# Patient Record
Sex: MALE | Race: ASIAN | NOT HISPANIC OR LATINO | Employment: UNEMPLOYED | URBAN - METROPOLITAN AREA
[De-identification: names, ages, dates, MRNs, and addresses within clinical notes are randomized per-mention and may not be internally consistent; named-entity substitution may affect disease eponyms.]

---

## 2017-01-19 ENCOUNTER — COMMUNICATION - HEALTHEAST (OUTPATIENT)
Dept: FAMILY MEDICINE | Facility: CLINIC | Age: 67
End: 2017-01-19

## 2017-01-20 ENCOUNTER — RECORDS - HEALTHEAST (OUTPATIENT)
Dept: ADMINISTRATIVE | Facility: OTHER | Age: 67
End: 2017-01-20

## 2017-01-31 ENCOUNTER — OFFICE VISIT - HEALTHEAST (OUTPATIENT)
Dept: FAMILY MEDICINE | Facility: CLINIC | Age: 67
End: 2017-01-31

## 2017-01-31 DIAGNOSIS — R39.15 BENIGN PROSTATIC HYPERPLASIA (BPH) WITH URINARY URGENCY: ICD-10-CM

## 2017-01-31 DIAGNOSIS — R32 URINARY INCONTINENCE: ICD-10-CM

## 2017-01-31 DIAGNOSIS — E78.00 PURE HYPERCHOLESTEROLEMIA: ICD-10-CM

## 2017-01-31 DIAGNOSIS — N40.1 BENIGN PROSTATIC HYPERPLASIA (BPH) WITH URINARY URGENCY: ICD-10-CM

## 2017-01-31 DIAGNOSIS — M48.061 SPINAL STENOSIS, LUMBAR REGION, WITHOUT NEUROGENIC CLAUDICATION: ICD-10-CM

## 2017-01-31 DIAGNOSIS — G57.92 NEURITIS OF LEFT LOWER EXTREMITY: ICD-10-CM

## 2017-02-01 ENCOUNTER — COMMUNICATION - HEALTHEAST (OUTPATIENT)
Dept: FAMILY MEDICINE | Facility: CLINIC | Age: 67
End: 2017-02-01

## 2017-02-15 ENCOUNTER — COMMUNICATION - HEALTHEAST (OUTPATIENT)
Dept: FAMILY MEDICINE | Facility: CLINIC | Age: 67
End: 2017-02-15

## 2017-02-15 DIAGNOSIS — M48.061 LUMBAR STENOSIS: ICD-10-CM

## 2017-03-03 ENCOUNTER — OFFICE VISIT - HEALTHEAST (OUTPATIENT)
Dept: FAMILY MEDICINE | Facility: CLINIC | Age: 67
End: 2017-03-03

## 2017-03-03 DIAGNOSIS — R39.15 BENIGN PROSTATIC HYPERPLASIA (BPH) WITH URINARY URGENCY: ICD-10-CM

## 2017-03-03 DIAGNOSIS — N40.1 BENIGN PROSTATIC HYPERPLASIA (BPH) WITH URINARY URGENCY: ICD-10-CM

## 2017-03-03 DIAGNOSIS — M48.061 SPINAL STENOSIS, LUMBAR REGION, WITHOUT NEUROGENIC CLAUDICATION: ICD-10-CM

## 2017-03-03 DIAGNOSIS — M25.562 LEFT KNEE PAIN: ICD-10-CM

## 2017-03-14 ENCOUNTER — RECORDS - HEALTHEAST (OUTPATIENT)
Dept: ADMINISTRATIVE | Facility: OTHER | Age: 67
End: 2017-03-14

## 2017-03-15 ENCOUNTER — COMMUNICATION - HEALTHEAST (OUTPATIENT)
Dept: FAMILY MEDICINE | Facility: CLINIC | Age: 67
End: 2017-03-15

## 2017-03-20 ENCOUNTER — COMMUNICATION - HEALTHEAST (OUTPATIENT)
Dept: FAMILY MEDICINE | Facility: CLINIC | Age: 67
End: 2017-03-20

## 2017-03-20 DIAGNOSIS — G57.92 NEURITIS OF LEFT LOWER EXTREMITY: ICD-10-CM

## 2017-04-14 ENCOUNTER — RECORDS - HEALTHEAST (OUTPATIENT)
Dept: ADMINISTRATIVE | Facility: OTHER | Age: 67
End: 2017-04-14

## 2017-04-20 ENCOUNTER — COMMUNICATION - HEALTHEAST (OUTPATIENT)
Dept: FAMILY MEDICINE | Facility: CLINIC | Age: 67
End: 2017-04-20

## 2017-04-20 DIAGNOSIS — G57.92 NEURITIS OF LEFT LOWER EXTREMITY: ICD-10-CM

## 2017-04-20 DIAGNOSIS — M48.061 SPINAL STENOSIS, LUMBAR REGION, WITHOUT NEUROGENIC CLAUDICATION: ICD-10-CM

## 2017-05-02 ENCOUNTER — OFFICE VISIT - HEALTHEAST (OUTPATIENT)
Dept: FAMILY MEDICINE | Facility: CLINIC | Age: 67
End: 2017-05-02

## 2017-05-02 DIAGNOSIS — N40.1 BENIGN PROSTATIC HYPERPLASIA (BPH) WITH URINARY URGENCY: ICD-10-CM

## 2017-05-02 DIAGNOSIS — R39.15 BENIGN PROSTATIC HYPERPLASIA (BPH) WITH URINARY URGENCY: ICD-10-CM

## 2017-05-17 ENCOUNTER — COMMUNICATION - HEALTHEAST (OUTPATIENT)
Dept: FAMILY MEDICINE | Facility: CLINIC | Age: 67
End: 2017-05-17

## 2017-05-17 DIAGNOSIS — R32 URINARY INCONTINENCE: ICD-10-CM

## 2017-07-20 ENCOUNTER — COMMUNICATION - HEALTHEAST (OUTPATIENT)
Dept: FAMILY MEDICINE | Facility: CLINIC | Age: 67
End: 2017-07-20

## 2017-07-20 DIAGNOSIS — R09.82 POSTNASAL DRIP: ICD-10-CM

## 2017-08-02 ENCOUNTER — OFFICE VISIT - HEALTHEAST (OUTPATIENT)
Dept: FAMILY MEDICINE | Facility: CLINIC | Age: 67
End: 2017-08-02

## 2017-08-02 DIAGNOSIS — R39.15 BENIGN PROSTATIC HYPERPLASIA (BPH) WITH URINARY URGENCY: ICD-10-CM

## 2017-08-02 DIAGNOSIS — R32 UNSPECIFIED URINARY INCONTINENCE: ICD-10-CM

## 2017-08-02 DIAGNOSIS — G57.92 NEURITIS OF LEFT LOWER EXTREMITY: ICD-10-CM

## 2017-08-02 DIAGNOSIS — M54.50 LUMBAGO: ICD-10-CM

## 2017-08-02 DIAGNOSIS — E78.00 PURE HYPERCHOLESTEROLEMIA: ICD-10-CM

## 2017-08-02 DIAGNOSIS — M48.061 SPINAL STENOSIS, LUMBAR REGION, WITHOUT NEUROGENIC CLAUDICATION: ICD-10-CM

## 2017-08-02 DIAGNOSIS — N40.1 BENIGN PROSTATIC HYPERPLASIA (BPH) WITH URINARY URGENCY: ICD-10-CM

## 2017-08-02 LAB
CHOLEST SERPL-MCNC: 240 MG/DL
FASTING STATUS PATIENT QL REPORTED: YES
HDLC SERPL-MCNC: 49 MG/DL
LDLC SERPL CALC-MCNC: 176 MG/DL
TRIGL SERPL-MCNC: 73 MG/DL

## 2017-08-16 ENCOUNTER — COMMUNICATION - HEALTHEAST (OUTPATIENT)
Dept: FAMILY MEDICINE | Facility: CLINIC | Age: 67
End: 2017-08-16

## 2017-08-25 ENCOUNTER — RECORDS - HEALTHEAST (OUTPATIENT)
Dept: ADMINISTRATIVE | Facility: OTHER | Age: 67
End: 2017-08-25

## 2017-08-28 ENCOUNTER — COMMUNICATION - HEALTHEAST (OUTPATIENT)
Dept: FAMILY MEDICINE | Facility: CLINIC | Age: 67
End: 2017-08-28

## 2017-09-20 ENCOUNTER — OFFICE VISIT - HEALTHEAST (OUTPATIENT)
Dept: FAMILY MEDICINE | Facility: CLINIC | Age: 67
End: 2017-09-20

## 2017-09-20 DIAGNOSIS — E78.00 PURE HYPERCHOLESTEROLEMIA: ICD-10-CM

## 2017-09-20 DIAGNOSIS — N40.1 BENIGN PROSTATIC HYPERPLASIA (BPH) WITH URINARY URGENCY: ICD-10-CM

## 2017-09-20 DIAGNOSIS — M48.061 SPINAL STENOSIS, LUMBAR REGION, WITHOUT NEUROGENIC CLAUDICATION: ICD-10-CM

## 2017-09-20 DIAGNOSIS — R39.15 BENIGN PROSTATIC HYPERPLASIA (BPH) WITH URINARY URGENCY: ICD-10-CM

## 2017-09-20 DIAGNOSIS — R32 UNSPECIFIED URINARY INCONTINENCE: ICD-10-CM

## 2017-09-21 ENCOUNTER — HOSPITAL ENCOUNTER (OUTPATIENT)
Dept: PHYSICAL MEDICINE AND REHAB | Facility: CLINIC | Age: 67
Discharge: HOME | End: 2017-09-21
Attending: PHYSICIAN ASSISTANT

## 2017-09-21 DIAGNOSIS — M47.816 LUMBAR FACET ARTHROPATHY: ICD-10-CM

## 2017-09-21 DIAGNOSIS — M54.16 LUMBAR RADICULITIS: ICD-10-CM

## 2017-09-22 ENCOUNTER — COMMUNICATION - HEALTHEAST (OUTPATIENT)
Dept: FAMILY MEDICINE | Facility: CLINIC | Age: 67
End: 2017-09-22

## 2017-09-22 DIAGNOSIS — M48.061 LUMBAR STENOSIS: ICD-10-CM

## 2017-09-24 ENCOUNTER — COMMUNICATION - HEALTHEAST (OUTPATIENT)
Dept: FAMILY MEDICINE | Facility: CLINIC | Age: 67
End: 2017-09-24

## 2017-09-24 DIAGNOSIS — K59.09 OTHER CONSTIPATION: ICD-10-CM

## 2017-09-24 DIAGNOSIS — N40.1 BENIGN PROSTATIC HYPERPLASIA (BPH) WITH URINARY URGENCY: ICD-10-CM

## 2017-09-24 DIAGNOSIS — R39.15 BENIGN PROSTATIC HYPERPLASIA (BPH) WITH URINARY URGENCY: ICD-10-CM

## 2017-09-25 RX ORDER — ERGOCALCIFEROL 1.25 MG/1
CAPSULE ORAL
Qty: 4 CAPSULE | Refills: 3 | Status: SHIPPED | OUTPATIENT
Start: 2017-09-25 | End: 2021-07-14

## 2017-10-02 ENCOUNTER — HOSPITAL ENCOUNTER (OUTPATIENT)
Dept: MRI IMAGING | Facility: HOSPITAL | Age: 67
Discharge: HOME | End: 2017-10-02
Attending: PHYSICIAN ASSISTANT

## 2017-10-02 DIAGNOSIS — M47.816 LUMBAR FACET ARTHROPATHY: ICD-10-CM

## 2017-10-02 DIAGNOSIS — M12.88 OTHER SPECIFIC ARTHROPATHIES, NOT ELSEWHERE CLASSIFIED, OTHER SPECIFIED SITE: ICD-10-CM

## 2017-10-02 DIAGNOSIS — M54.16 LUMBAR RADICULITIS: ICD-10-CM

## 2017-10-03 ENCOUNTER — RECORDS - HEALTHEAST (OUTPATIENT)
Dept: ADMINISTRATIVE | Facility: OTHER | Age: 67
End: 2017-10-03

## 2017-10-03 ENCOUNTER — OFFICE VISIT - HEALTHEAST (OUTPATIENT)
Dept: FAMILY MEDICINE | Facility: CLINIC | Age: 67
End: 2017-10-03

## 2017-10-03 DIAGNOSIS — R39.15 BENIGN PROSTATIC HYPERPLASIA (BPH) WITH URINARY URGENCY: ICD-10-CM

## 2017-10-03 DIAGNOSIS — H10.10 ALLERGIC CONJUNCTIVITIS: ICD-10-CM

## 2017-10-03 DIAGNOSIS — M48.061 SPINAL STENOSIS, LUMBAR REGION, WITHOUT NEUROGENIC CLAUDICATION: ICD-10-CM

## 2017-10-03 DIAGNOSIS — G57.92 NEURITIS OF LEFT LOWER EXTREMITY: ICD-10-CM

## 2017-10-03 DIAGNOSIS — N40.1 BENIGN PROSTATIC HYPERPLASIA (BPH) WITH URINARY URGENCY: ICD-10-CM

## 2017-10-03 DIAGNOSIS — Z00.00 PREVENTATIVE HEALTH CARE: ICD-10-CM

## 2017-10-09 ENCOUNTER — HOSPITAL ENCOUNTER (OUTPATIENT)
Dept: PHYSICAL MEDICINE AND REHAB | Facility: CLINIC | Age: 67
Discharge: HOME | End: 2017-10-09
Attending: PHYSICIAN ASSISTANT

## 2017-10-09 DIAGNOSIS — M54.16 LUMBAR RADICULITIS: ICD-10-CM

## 2017-10-09 DIAGNOSIS — M43.16 SPONDYLOLISTHESIS, LUMBAR REGION: ICD-10-CM

## 2017-10-09 DIAGNOSIS — M47.816 LUMBAR FACET ARTHROPATHY: ICD-10-CM

## 2017-10-09 ASSESSMENT — MIFFLIN-ST. JEOR: SCORE: 1230.97

## 2017-10-12 ENCOUNTER — HOSPITAL ENCOUNTER (OUTPATIENT)
Dept: PHYSICAL MEDICINE AND REHAB | Facility: CLINIC | Age: 67
Discharge: HOME | End: 2017-10-12
Attending: PHYSICIAN ASSISTANT

## 2017-10-12 DIAGNOSIS — M54.16 LUMBAR RADICULITIS: ICD-10-CM

## 2017-10-13 ENCOUNTER — OFFICE VISIT - HEALTHEAST (OUTPATIENT)
Dept: PHYSICAL THERAPY | Facility: CLINIC | Age: 67
End: 2017-10-13

## 2017-10-13 DIAGNOSIS — M47.816 LUMBAR FACET ARTHROPATHY: ICD-10-CM

## 2017-10-13 DIAGNOSIS — M54.16 LUMBAR RADICULITIS: ICD-10-CM

## 2017-10-17 ENCOUNTER — OFFICE VISIT - HEALTHEAST (OUTPATIENT)
Dept: PHYSICAL THERAPY | Facility: CLINIC | Age: 67
End: 2017-10-17

## 2017-10-17 DIAGNOSIS — M54.16 LUMBAR RADICULITIS: ICD-10-CM

## 2017-10-17 DIAGNOSIS — M47.816 LUMBAR FACET ARTHROPATHY: ICD-10-CM

## 2017-10-24 ENCOUNTER — OFFICE VISIT - HEALTHEAST (OUTPATIENT)
Dept: PHYSICAL THERAPY | Facility: CLINIC | Age: 67
End: 2017-10-24

## 2017-10-24 DIAGNOSIS — M47.816 LUMBAR FACET ARTHROPATHY: ICD-10-CM

## 2017-10-24 DIAGNOSIS — M54.16 LUMBAR RADICULITIS: ICD-10-CM

## 2017-10-26 ENCOUNTER — HOSPITAL ENCOUNTER (OUTPATIENT)
Dept: PHYSICAL MEDICINE AND REHAB | Facility: CLINIC | Age: 67
Discharge: HOME | End: 2017-10-26
Attending: PHYSICIAN ASSISTANT

## 2017-10-26 DIAGNOSIS — M70.61 TROCHANTERIC BURSITIS OF RIGHT HIP: ICD-10-CM

## 2017-10-26 DIAGNOSIS — M43.16 SPONDYLOLISTHESIS, LUMBAR REGION: ICD-10-CM

## 2017-10-26 DIAGNOSIS — M47.816 LUMBAR FACET ARTHROPATHY: ICD-10-CM

## 2017-10-26 DIAGNOSIS — M54.16 LUMBAR RADICULITIS: ICD-10-CM

## 2017-10-26 DIAGNOSIS — M16.9 OA (OSTEOARTHRITIS) OF HIP: ICD-10-CM

## 2017-10-27 ENCOUNTER — OFFICE VISIT - HEALTHEAST (OUTPATIENT)
Dept: PHYSICAL THERAPY | Facility: CLINIC | Age: 67
End: 2017-10-27

## 2017-10-27 DIAGNOSIS — G89.29 CHRONIC RIGHT HIP PAIN: ICD-10-CM

## 2017-10-27 DIAGNOSIS — M47.816 LUMBAR FACET ARTHROPATHY: ICD-10-CM

## 2017-10-27 DIAGNOSIS — M54.16 LUMBAR RADICULITIS: ICD-10-CM

## 2017-10-27 DIAGNOSIS — M25.551 CHRONIC RIGHT HIP PAIN: ICD-10-CM

## 2017-10-31 ENCOUNTER — OFFICE VISIT - HEALTHEAST (OUTPATIENT)
Dept: PHYSICAL THERAPY | Facility: CLINIC | Age: 67
End: 2017-10-31

## 2017-10-31 DIAGNOSIS — M47.816 LUMBAR FACET ARTHROPATHY: ICD-10-CM

## 2017-10-31 DIAGNOSIS — M25.551 CHRONIC RIGHT HIP PAIN: ICD-10-CM

## 2017-10-31 DIAGNOSIS — G89.29 CHRONIC RIGHT HIP PAIN: ICD-10-CM

## 2017-10-31 DIAGNOSIS — M54.16 LUMBAR RADICULITIS: ICD-10-CM

## 2017-11-03 ENCOUNTER — COMMUNICATION - HEALTHEAST (OUTPATIENT)
Dept: FAMILY MEDICINE | Facility: CLINIC | Age: 67
End: 2017-11-03

## 2017-11-16 ENCOUNTER — OFFICE VISIT - HEALTHEAST (OUTPATIENT)
Dept: PHYSICAL THERAPY | Facility: CLINIC | Age: 67
End: 2017-11-16

## 2017-11-16 DIAGNOSIS — M54.16 LUMBAR RADICULITIS: ICD-10-CM

## 2017-11-16 DIAGNOSIS — M25.551 CHRONIC RIGHT HIP PAIN: ICD-10-CM

## 2017-11-16 DIAGNOSIS — G89.29 CHRONIC RIGHT HIP PAIN: ICD-10-CM

## 2017-11-16 DIAGNOSIS — M47.816 LUMBAR FACET ARTHROPATHY: ICD-10-CM

## 2017-11-21 ENCOUNTER — OFFICE VISIT - HEALTHEAST (OUTPATIENT)
Dept: PHYSICAL THERAPY | Facility: CLINIC | Age: 67
End: 2017-11-21

## 2017-11-21 DIAGNOSIS — M47.816 LUMBAR FACET ARTHROPATHY: ICD-10-CM

## 2017-11-21 DIAGNOSIS — M54.16 LUMBAR RADICULITIS: ICD-10-CM

## 2017-11-21 DIAGNOSIS — M25.551 CHRONIC RIGHT HIP PAIN: ICD-10-CM

## 2017-11-21 DIAGNOSIS — G89.29 CHRONIC RIGHT HIP PAIN: ICD-10-CM

## 2017-11-24 ENCOUNTER — HOSPITAL ENCOUNTER (OUTPATIENT)
Dept: PHYSICAL MEDICINE AND REHAB | Facility: CLINIC | Age: 67
Discharge: HOME | End: 2017-11-24
Attending: PHYSICIAN ASSISTANT

## 2017-11-24 DIAGNOSIS — M48.061 LUMBAR SPINAL STENOSIS: ICD-10-CM

## 2017-11-24 DIAGNOSIS — M54.16 LUMBAR RADICULITIS: ICD-10-CM

## 2017-11-24 DIAGNOSIS — M43.16 SPONDYLOLISTHESIS, LUMBAR REGION: ICD-10-CM

## 2017-11-28 ENCOUNTER — OFFICE VISIT - HEALTHEAST (OUTPATIENT)
Dept: PHYSICAL THERAPY | Facility: CLINIC | Age: 67
End: 2017-11-28

## 2017-11-28 DIAGNOSIS — M47.816 LUMBAR FACET ARTHROPATHY: ICD-10-CM

## 2017-11-28 DIAGNOSIS — G89.29 CHRONIC RIGHT HIP PAIN: ICD-10-CM

## 2017-11-28 DIAGNOSIS — M25.551 CHRONIC RIGHT HIP PAIN: ICD-10-CM

## 2017-11-28 DIAGNOSIS — M54.16 LUMBAR RADICULITIS: ICD-10-CM

## 2017-12-22 ENCOUNTER — HOSPITAL ENCOUNTER (OUTPATIENT)
Dept: PHYSICAL MEDICINE AND REHAB | Facility: CLINIC | Age: 67
Discharge: HOME | End: 2017-12-22
Attending: PHYSICIAN ASSISTANT

## 2017-12-22 DIAGNOSIS — M47.816 LUMBAR FACET ARTHROPATHY: ICD-10-CM

## 2017-12-22 DIAGNOSIS — M54.16 LUMBAR RADICULITIS: ICD-10-CM

## 2017-12-22 DIAGNOSIS — M48.061 LUMBAR SPINAL STENOSIS: ICD-10-CM

## 2017-12-22 DIAGNOSIS — M43.16 SPONDYLOLISTHESIS, LUMBAR REGION: ICD-10-CM

## 2018-01-19 ENCOUNTER — RECORDS - HEALTHEAST (OUTPATIENT)
Dept: ADMINISTRATIVE | Facility: OTHER | Age: 68
End: 2018-01-19

## 2018-01-25 ENCOUNTER — COMMUNICATION - HEALTHEAST (OUTPATIENT)
Dept: FAMILY MEDICINE | Facility: CLINIC | Age: 68
End: 2018-01-25

## 2018-01-25 DIAGNOSIS — G57.92 NEURITIS OF LEFT LOWER EXTREMITY: ICD-10-CM

## 2018-02-05 ENCOUNTER — HOSPITAL ENCOUNTER (OUTPATIENT)
Dept: PHYSICAL MEDICINE AND REHAB | Facility: CLINIC | Age: 68
Discharge: HOME | End: 2018-02-05
Attending: PHYSICIAN ASSISTANT

## 2018-02-05 DIAGNOSIS — M43.16 SPONDYLOLISTHESIS, LUMBAR REGION: ICD-10-CM

## 2018-02-05 DIAGNOSIS — M47.816 LUMBAR FACET ARTHROPATHY: ICD-10-CM

## 2018-02-05 DIAGNOSIS — M48.061 LUMBAR SPINAL STENOSIS: ICD-10-CM

## 2018-02-05 DIAGNOSIS — M54.16 LUMBAR RADICULITIS: ICD-10-CM

## 2018-02-06 ENCOUNTER — OFFICE VISIT - HEALTHEAST (OUTPATIENT)
Dept: FAMILY MEDICINE | Facility: CLINIC | Age: 68
End: 2018-02-06

## 2018-02-06 DIAGNOSIS — G57.92 NEURITIS OF LEFT LOWER EXTREMITY: ICD-10-CM

## 2018-02-06 DIAGNOSIS — M54.50 LUMBAGO: ICD-10-CM

## 2018-02-06 DIAGNOSIS — E78.00 PURE HYPERCHOLESTEROLEMIA: ICD-10-CM

## 2018-02-06 DIAGNOSIS — R39.15 BENIGN PROSTATIC HYPERPLASIA (BPH) WITH URINARY URGENCY: ICD-10-CM

## 2018-02-06 DIAGNOSIS — N40.1 BENIGN PROSTATIC HYPERPLASIA (BPH) WITH URINARY URGENCY: ICD-10-CM

## 2018-02-06 ASSESSMENT — MIFFLIN-ST. JEOR: SCORE: 1271.17

## 2018-02-07 ENCOUNTER — COMMUNICATION - HEALTHEAST (OUTPATIENT)
Dept: FAMILY MEDICINE | Facility: CLINIC | Age: 68
End: 2018-02-07

## 2018-03-20 ENCOUNTER — HOSPITAL ENCOUNTER (OUTPATIENT)
Dept: PHYSICAL MEDICINE AND REHAB | Facility: CLINIC | Age: 68
Discharge: HOME | End: 2018-03-20
Attending: PHYSICIAN ASSISTANT

## 2018-03-20 DIAGNOSIS — M47.816 LUMBAR FACET ARTHROPATHY: ICD-10-CM

## 2018-03-20 DIAGNOSIS — M54.16 LUMBAR RADICULITIS: ICD-10-CM

## 2018-03-20 DIAGNOSIS — M43.16 SPONDYLOLISTHESIS, LUMBAR REGION: ICD-10-CM

## 2018-03-20 DIAGNOSIS — M48.061 LUMBAR SPINAL STENOSIS: ICD-10-CM

## 2018-03-22 ENCOUNTER — COMMUNICATION - HEALTHEAST (OUTPATIENT)
Dept: FAMILY MEDICINE | Facility: CLINIC | Age: 68
End: 2018-03-22

## 2018-03-23 ENCOUNTER — HOSPITAL ENCOUNTER (OUTPATIENT)
Dept: PHYSICAL MEDICINE AND REHAB | Facility: CLINIC | Age: 68
Discharge: HOME | End: 2018-03-23
Attending: PHYSICIAN ASSISTANT

## 2018-03-23 DIAGNOSIS — M54.16 LUMBAR RADICULITIS: ICD-10-CM

## 2018-03-28 ENCOUNTER — OFFICE VISIT - HEALTHEAST (OUTPATIENT)
Dept: FAMILY MEDICINE | Facility: CLINIC | Age: 68
End: 2018-03-28

## 2018-03-28 DIAGNOSIS — Z23 NEED FOR PNEUMOCOCCAL VACCINATION: ICD-10-CM

## 2018-03-28 DIAGNOSIS — K59.00 CONSTIPATION: ICD-10-CM

## 2018-03-28 DIAGNOSIS — N40.1 BENIGN PROSTATIC HYPERPLASIA (BPH) WITH URINARY URGENCY: ICD-10-CM

## 2018-03-28 DIAGNOSIS — M94.261 CHONDROMALACIA OF KNEE, RIGHT: ICD-10-CM

## 2018-03-28 DIAGNOSIS — M25.561 RIGHT KNEE PAIN: ICD-10-CM

## 2018-03-28 DIAGNOSIS — M48.061 SPINAL STENOSIS, LUMBAR REGION, WITHOUT NEUROGENIC CLAUDICATION: ICD-10-CM

## 2018-03-28 DIAGNOSIS — R39.15 BENIGN PROSTATIC HYPERPLASIA (BPH) WITH URINARY URGENCY: ICD-10-CM

## 2018-03-28 ASSESSMENT — MIFFLIN-ST. JEOR: SCORE: 1275.19

## 2018-04-06 ENCOUNTER — HOSPITAL ENCOUNTER (OUTPATIENT)
Dept: PHYSICAL MEDICINE AND REHAB | Facility: CLINIC | Age: 68
Discharge: HOME | End: 2018-04-06
Attending: PHYSICIAN ASSISTANT

## 2018-04-06 DIAGNOSIS — M43.16 SPONDYLOLISTHESIS, LUMBAR REGION: ICD-10-CM

## 2018-04-06 DIAGNOSIS — M17.0 PRIMARY OSTEOARTHRITIS OF BOTH KNEES: ICD-10-CM

## 2018-04-06 DIAGNOSIS — M48.061 LUMBAR SPINAL STENOSIS: ICD-10-CM

## 2018-04-06 DIAGNOSIS — M47.816 LUMBAR FACET ARTHROPATHY: ICD-10-CM

## 2018-04-06 DIAGNOSIS — M54.16 LUMBAR RADICULITIS: ICD-10-CM

## 2018-04-19 ENCOUNTER — COMMUNICATION - HEALTHEAST (OUTPATIENT)
Dept: FAMILY MEDICINE | Facility: CLINIC | Age: 68
End: 2018-04-19

## 2018-04-19 DIAGNOSIS — R09.82 POSTNASAL DRIP: ICD-10-CM

## 2018-05-22 ENCOUNTER — COMMUNICATION - HEALTHEAST (OUTPATIENT)
Dept: FAMILY MEDICINE | Facility: CLINIC | Age: 68
End: 2018-05-22

## 2018-05-22 DIAGNOSIS — R32 URINARY INCONTINENCE: ICD-10-CM

## 2018-06-06 ENCOUNTER — OFFICE VISIT - HEALTHEAST (OUTPATIENT)
Dept: FAMILY MEDICINE | Facility: CLINIC | Age: 68
End: 2018-06-06

## 2018-06-06 DIAGNOSIS — G57.92 NEURITIS OF LEFT LOWER EXTREMITY: ICD-10-CM

## 2018-06-06 DIAGNOSIS — R39.15 BENIGN PROSTATIC HYPERPLASIA (BPH) WITH URINARY URGENCY: ICD-10-CM

## 2018-06-06 DIAGNOSIS — R09.82 POSTNASAL DRIP: ICD-10-CM

## 2018-06-06 DIAGNOSIS — N40.1 BENIGN PROSTATIC HYPERPLASIA (BPH) WITH URINARY URGENCY: ICD-10-CM

## 2018-06-06 DIAGNOSIS — M48.061 SPINAL STENOSIS, LUMBAR REGION, WITHOUT NEUROGENIC CLAUDICATION: ICD-10-CM

## 2018-06-06 DIAGNOSIS — K59.09 OTHER CONSTIPATION: ICD-10-CM

## 2018-06-06 ASSESSMENT — MIFFLIN-ST. JEOR: SCORE: 1262.38

## 2018-07-27 ENCOUNTER — HOSPITAL ENCOUNTER (OUTPATIENT)
Dept: PHYSICAL MEDICINE AND REHAB | Facility: CLINIC | Age: 68
Discharge: HOME | End: 2018-07-27
Attending: PHYSICIAN ASSISTANT

## 2018-07-27 DIAGNOSIS — M17.11 PRIMARY OSTEOARTHRITIS OF RIGHT KNEE: ICD-10-CM

## 2018-07-27 DIAGNOSIS — M54.16 LUMBAR RADICULITIS: ICD-10-CM

## 2018-07-27 DIAGNOSIS — M43.16 SPONDYLOLISTHESIS, LUMBAR REGION: ICD-10-CM

## 2018-07-27 DIAGNOSIS — M17.0 PRIMARY OSTEOARTHRITIS OF BOTH KNEES: ICD-10-CM

## 2018-07-27 DIAGNOSIS — M48.061 LUMBAR SPINAL STENOSIS: ICD-10-CM

## 2018-08-13 ENCOUNTER — HOSPITAL ENCOUNTER (OUTPATIENT)
Dept: PHYSICAL MEDICINE AND REHAB | Facility: CLINIC | Age: 68
Discharge: HOME | End: 2018-08-13
Attending: PHYSICIAN ASSISTANT

## 2018-08-13 DIAGNOSIS — M17.11 PRIMARY OSTEOARTHRITIS OF RIGHT KNEE: ICD-10-CM

## 2018-08-13 DIAGNOSIS — M43.16 SPONDYLOLISTHESIS, LUMBAR REGION: ICD-10-CM

## 2018-08-13 DIAGNOSIS — M48.061 LUMBAR SPINAL STENOSIS: ICD-10-CM

## 2018-08-13 DIAGNOSIS — M54.16 LUMBAR RADICULITIS: ICD-10-CM

## 2018-08-21 ENCOUNTER — COMMUNICATION - HEALTHEAST (OUTPATIENT)
Dept: FAMILY MEDICINE | Facility: CLINIC | Age: 68
End: 2018-08-21

## 2018-08-21 DIAGNOSIS — J30.2 SEASONAL ALLERGIES: ICD-10-CM

## 2018-08-22 ENCOUNTER — COMMUNICATION - HEALTHEAST (OUTPATIENT)
Dept: FAMILY MEDICINE | Facility: CLINIC | Age: 68
End: 2018-08-22

## 2018-09-05 ENCOUNTER — HOSPITAL ENCOUNTER (OUTPATIENT)
Dept: PHYSICAL MEDICINE AND REHAB | Facility: CLINIC | Age: 68
Discharge: HOME | End: 2018-09-05
Attending: PHYSICIAN ASSISTANT

## 2018-09-05 DIAGNOSIS — M54.16 LUMBAR RADICULITIS: ICD-10-CM

## 2018-09-06 ENCOUNTER — OFFICE VISIT - HEALTHEAST (OUTPATIENT)
Dept: FAMILY MEDICINE | Facility: CLINIC | Age: 68
End: 2018-09-06

## 2018-09-06 DIAGNOSIS — Z23 NEED FOR IMMUNIZATION AGAINST INFLUENZA: ICD-10-CM

## 2018-09-06 DIAGNOSIS — M17.11 PRIMARY OSTEOARTHRITIS OF RIGHT KNEE: ICD-10-CM

## 2018-09-06 DIAGNOSIS — M48.061 SPINAL STENOSIS, LUMBAR REGION, WITHOUT NEUROGENIC CLAUDICATION: ICD-10-CM

## 2018-09-06 DIAGNOSIS — E78.00 PURE HYPERCHOLESTEROLEMIA: ICD-10-CM

## 2018-09-06 DIAGNOSIS — R39.15 BENIGN PROSTATIC HYPERPLASIA (BPH) WITH URINARY URGENCY: ICD-10-CM

## 2018-09-06 DIAGNOSIS — N40.1 BENIGN PROSTATIC HYPERPLASIA (BPH) WITH URINARY URGENCY: ICD-10-CM

## 2018-09-06 LAB
ANION GAP SERPL CALCULATED.3IONS-SCNC: 13 MMOL/L (ref 5–18)
BUN SERPL-MCNC: 14 MG/DL (ref 8–22)
CALCIUM SERPL-MCNC: 9.9 MG/DL (ref 8.5–10.5)
CHLORIDE BLD-SCNC: 102 MMOL/L (ref 98–107)
CHOLEST SERPL-MCNC: 271 MG/DL
CO2 SERPL-SCNC: 26 MMOL/L (ref 22–31)
CREAT SERPL-MCNC: 0.97 MG/DL (ref 0.7–1.3)
FASTING STATUS PATIENT QL REPORTED: NO
GFR SERPL CREATININE-BSD FRML MDRD: >60 ML/MIN/1.73M2
GLUCOSE BLD-MCNC: 102 MG/DL (ref 70–125)
HDLC SERPL-MCNC: 55 MG/DL
LDLC SERPL CALC-MCNC: 189 MG/DL
POTASSIUM BLD-SCNC: 4.9 MMOL/L (ref 3.5–5)
SODIUM SERPL-SCNC: 141 MMOL/L (ref 136–145)
TRIGL SERPL-MCNC: 134 MG/DL

## 2018-09-06 ASSESSMENT — MIFFLIN-ST. JEOR: SCORE: 1249.34

## 2018-09-07 LAB — 25(OH)D3 SERPL-MCNC: 37.2 NG/ML (ref 30–80)

## 2018-09-20 ENCOUNTER — HOSPITAL ENCOUNTER (OUTPATIENT)
Dept: PHYSICAL MEDICINE AND REHAB | Facility: CLINIC | Age: 68
Discharge: HOME | End: 2018-09-20
Attending: PHYSICIAN ASSISTANT

## 2018-09-20 DIAGNOSIS — M54.16 LUMBAR RADICULITIS: ICD-10-CM

## 2018-09-20 DIAGNOSIS — M43.16 SPONDYLOLISTHESIS, LUMBAR REGION: ICD-10-CM

## 2018-09-20 DIAGNOSIS — M17.11 PRIMARY OSTEOARTHRITIS OF RIGHT KNEE: ICD-10-CM

## 2018-09-25 ENCOUNTER — COMMUNICATION - HEALTHEAST (OUTPATIENT)
Dept: OTHER | Facility: CLINIC | Age: 68
End: 2018-09-25

## 2018-10-18 ENCOUNTER — COMMUNICATION - HEALTHEAST (OUTPATIENT)
Dept: FAMILY MEDICINE | Facility: CLINIC | Age: 68
End: 2018-10-18

## 2018-10-18 DIAGNOSIS — M48.061 SPINAL STENOSIS, LUMBAR REGION, WITHOUT NEUROGENIC CLAUDICATION: ICD-10-CM

## 2018-10-18 DIAGNOSIS — G57.92 NEURITIS OF LEFT LOWER EXTREMITY: ICD-10-CM

## 2018-12-20 ENCOUNTER — HOSPITAL ENCOUNTER (OUTPATIENT)
Dept: PHYSICAL MEDICINE AND REHAB | Facility: CLINIC | Age: 68
Discharge: HOME | End: 2018-12-20
Attending: PHYSICIAN ASSISTANT

## 2018-12-20 DIAGNOSIS — M17.0 PRIMARY OSTEOARTHRITIS OF BOTH KNEES: ICD-10-CM

## 2018-12-20 DIAGNOSIS — M54.16 LUMBAR RADICULITIS: ICD-10-CM

## 2018-12-20 DIAGNOSIS — M43.16 SPONDYLOLISTHESIS, LUMBAR REGION: ICD-10-CM

## 2018-12-20 DIAGNOSIS — M48.061 SPINAL STENOSIS OF LUMBAR REGION, UNSPECIFIED WHETHER NEUROGENIC CLAUDICATION PRESENT: ICD-10-CM

## 2018-12-20 DIAGNOSIS — M17.11 PRIMARY OSTEOARTHRITIS OF RIGHT KNEE: ICD-10-CM

## 2019-01-08 ENCOUNTER — OFFICE VISIT - HEALTHEAST (OUTPATIENT)
Dept: FAMILY MEDICINE | Facility: CLINIC | Age: 69
End: 2019-01-08

## 2019-01-08 DIAGNOSIS — K59.04 CHRONIC IDIOPATHIC CONSTIPATION: ICD-10-CM

## 2019-01-08 DIAGNOSIS — K59.09 OTHER CONSTIPATION: ICD-10-CM

## 2019-01-08 DIAGNOSIS — G57.92 NEURITIS OF LEFT LOWER EXTREMITY: ICD-10-CM

## 2019-01-08 DIAGNOSIS — N40.1 BENIGN PROSTATIC HYPERPLASIA (BPH) WITH URINARY URGENCY: ICD-10-CM

## 2019-01-08 DIAGNOSIS — R39.15 BENIGN PROSTATIC HYPERPLASIA (BPH) WITH URINARY URGENCY: ICD-10-CM

## 2019-01-08 DIAGNOSIS — M48.061 SPINAL STENOSIS, LUMBAR REGION, WITHOUT NEUROGENIC CLAUDICATION: ICD-10-CM

## 2019-01-08 ASSESSMENT — MIFFLIN-ST. JEOR: SCORE: 1270.89

## 2019-02-19 ENCOUNTER — OFFICE VISIT - HEALTHEAST (OUTPATIENT)
Dept: FAMILY MEDICINE | Facility: CLINIC | Age: 69
End: 2019-02-19

## 2019-02-19 DIAGNOSIS — R11.2 NAUSEA AND VOMITING IN ADULT: ICD-10-CM

## 2019-02-19 DIAGNOSIS — N39.41 URGE INCONTINENCE OF URINE: ICD-10-CM

## 2019-02-19 DIAGNOSIS — G57.92 NEURITIS OF LEFT LOWER EXTREMITY: ICD-10-CM

## 2019-02-19 DIAGNOSIS — G44.209 TENSION HEADACHE: ICD-10-CM

## 2019-02-19 DIAGNOSIS — M48.061 SPINAL STENOSIS, LUMBAR REGION, WITHOUT NEUROGENIC CLAUDICATION: ICD-10-CM

## 2019-02-19 ASSESSMENT — MIFFLIN-ST. JEOR: SCORE: 1260.68

## 2019-03-07 ENCOUNTER — COMMUNICATION - HEALTHEAST (OUTPATIENT)
Dept: FAMILY MEDICINE | Facility: CLINIC | Age: 69
End: 2019-03-07

## 2019-03-07 DIAGNOSIS — J30.2 SEASONAL ALLERGIES: ICD-10-CM

## 2019-03-12 ENCOUNTER — OFFICE VISIT - HEALTHEAST (OUTPATIENT)
Dept: FAMILY MEDICINE | Facility: CLINIC | Age: 69
End: 2019-03-12

## 2019-03-12 DIAGNOSIS — Z00.00 ROUTINE GENERAL MEDICAL EXAMINATION AT A HEALTH CARE FACILITY: ICD-10-CM

## 2019-03-12 DIAGNOSIS — R39.15 BENIGN PROSTATIC HYPERPLASIA (BPH) WITH URINARY URGENCY: ICD-10-CM

## 2019-03-12 DIAGNOSIS — N40.1 BENIGN PROSTATIC HYPERPLASIA (BPH) WITH URINARY URGENCY: ICD-10-CM

## 2019-03-12 DIAGNOSIS — J30.2 SEASONAL ALLERGIES: ICD-10-CM

## 2019-03-12 DIAGNOSIS — M48.061 SPINAL STENOSIS, LUMBAR REGION, WITHOUT NEUROGENIC CLAUDICATION: ICD-10-CM

## 2019-03-12 ASSESSMENT — MIFFLIN-ST. JEOR: SCORE: 1254.03

## 2019-03-13 ENCOUNTER — COMMUNICATION - HEALTHEAST (OUTPATIENT)
Dept: FAMILY MEDICINE | Facility: CLINIC | Age: 69
End: 2019-03-13

## 2019-04-04 ENCOUNTER — COMMUNICATION - HEALTHEAST (OUTPATIENT)
Dept: FAMILY MEDICINE | Facility: CLINIC | Age: 69
End: 2019-04-04

## 2019-04-04 DIAGNOSIS — R09.82 POSTNASAL DRIP: ICD-10-CM

## 2019-06-02 ENCOUNTER — COMMUNICATION - HEALTHEAST (OUTPATIENT)
Dept: FAMILY MEDICINE | Facility: CLINIC | Age: 69
End: 2019-06-02

## 2019-06-02 DIAGNOSIS — R32 URINARY INCONTINENCE: ICD-10-CM

## 2019-06-12 ENCOUNTER — OFFICE VISIT - HEALTHEAST (OUTPATIENT)
Dept: FAMILY MEDICINE | Facility: CLINIC | Age: 69
End: 2019-06-12

## 2019-06-12 ENCOUNTER — COMMUNICATION - HEALTHEAST (OUTPATIENT)
Dept: FAMILY MEDICINE | Facility: CLINIC | Age: 69
End: 2019-06-12

## 2019-06-12 DIAGNOSIS — G57.92 NEURITIS OF LEFT LOWER EXTREMITY: ICD-10-CM

## 2019-06-12 DIAGNOSIS — N39.41 URGE INCONTINENCE OF URINE: ICD-10-CM

## 2019-06-12 DIAGNOSIS — Z12.11 SCREENING FOR COLON CANCER: ICD-10-CM

## 2019-06-12 DIAGNOSIS — K59.09 OTHER CONSTIPATION: ICD-10-CM

## 2019-06-12 DIAGNOSIS — N40.1 BENIGN PROSTATIC HYPERPLASIA (BPH) WITH URINARY URGENCY: ICD-10-CM

## 2019-06-12 DIAGNOSIS — M17.11 PRIMARY OSTEOARTHRITIS OF RIGHT KNEE: ICD-10-CM

## 2019-06-12 DIAGNOSIS — R39.15 BENIGN PROSTATIC HYPERPLASIA (BPH) WITH URINARY URGENCY: ICD-10-CM

## 2019-06-12 DIAGNOSIS — H10.13 ALLERGIC CONJUNCTIVITIS, BILATERAL: ICD-10-CM

## 2019-06-12 DIAGNOSIS — M48.061 SPINAL STENOSIS, LUMBAR REGION, WITHOUT NEUROGENIC CLAUDICATION: ICD-10-CM

## 2019-06-12 ASSESSMENT — MIFFLIN-ST. JEOR: SCORE: 1239.51

## 2019-06-19 ENCOUNTER — RECORDS - HEALTHEAST (OUTPATIENT)
Dept: ADMINISTRATIVE | Facility: OTHER | Age: 69
End: 2019-06-19

## 2019-06-19 LAB — COLOGUARD-ABSTRACT: NEGATIVE

## 2019-06-20 ENCOUNTER — RECORDS - HEALTHEAST (OUTPATIENT)
Dept: ADMINISTRATIVE | Facility: OTHER | Age: 69
End: 2019-06-20

## 2019-07-01 ENCOUNTER — COMMUNICATION - HEALTHEAST (OUTPATIENT)
Dept: FAMILY MEDICINE | Facility: CLINIC | Age: 69
End: 2019-07-01

## 2019-07-01 DIAGNOSIS — R39.15 BENIGN PROSTATIC HYPERPLASIA (BPH) WITH URINARY URGENCY: ICD-10-CM

## 2019-07-01 DIAGNOSIS — N40.1 BENIGN PROSTATIC HYPERPLASIA (BPH) WITH URINARY URGENCY: ICD-10-CM

## 2019-07-01 DIAGNOSIS — R09.82 POSTNASAL DRIP: ICD-10-CM

## 2019-07-08 ENCOUNTER — RECORDS - HEALTHEAST (OUTPATIENT)
Dept: HEALTH INFORMATION MANAGEMENT | Facility: CLINIC | Age: 69
End: 2019-07-08

## 2019-07-17 ENCOUNTER — OFFICE VISIT - HEALTHEAST (OUTPATIENT)
Dept: FAMILY MEDICINE | Facility: CLINIC | Age: 69
End: 2019-07-17

## 2019-07-17 DIAGNOSIS — M17.12 PRIMARY OSTEOARTHRITIS OF LEFT KNEE: ICD-10-CM

## 2019-07-17 DIAGNOSIS — N39.3 STRESS INCONTINENCE OF URINE: ICD-10-CM

## 2019-07-17 DIAGNOSIS — K59.00 CONSTIPATION, UNSPECIFIED CONSTIPATION TYPE: ICD-10-CM

## 2019-07-17 DIAGNOSIS — M48.061 SPINAL STENOSIS, LUMBAR REGION, WITHOUT NEUROGENIC CLAUDICATION: ICD-10-CM

## 2019-07-17 ASSESSMENT — MIFFLIN-ST. JEOR: SCORE: 1242.63

## 2019-07-25 ENCOUNTER — OFFICE VISIT - HEALTHEAST (OUTPATIENT)
Dept: FAMILY MEDICINE | Facility: CLINIC | Age: 69
End: 2019-07-25

## 2019-07-25 DIAGNOSIS — M48.061 SPINAL STENOSIS, LUMBAR REGION, WITHOUT NEUROGENIC CLAUDICATION: ICD-10-CM

## 2019-07-25 DIAGNOSIS — M17.11 PRIMARY OSTEOARTHRITIS OF RIGHT KNEE: ICD-10-CM

## 2019-07-25 DIAGNOSIS — G57.92 NEURITIS OF LEFT LOWER EXTREMITY: ICD-10-CM

## 2019-07-25 DIAGNOSIS — R09.82 POSTNASAL DRIP: ICD-10-CM

## 2019-07-25 ASSESSMENT — MIFFLIN-ST. JEOR: SCORE: 1234.13

## 2019-09-13 ENCOUNTER — OFFICE VISIT - HEALTHEAST (OUTPATIENT)
Dept: FAMILY MEDICINE | Facility: CLINIC | Age: 69
End: 2019-09-13

## 2019-09-13 DIAGNOSIS — N40.1 BENIGN PROSTATIC HYPERPLASIA (BPH) WITH URINARY URGENCY: ICD-10-CM

## 2019-09-13 DIAGNOSIS — M48.061 SPINAL STENOSIS, LUMBAR REGION, WITHOUT NEUROGENIC CLAUDICATION: ICD-10-CM

## 2019-09-13 DIAGNOSIS — M17.11 PRIMARY OSTEOARTHRITIS OF RIGHT KNEE: ICD-10-CM

## 2019-09-13 DIAGNOSIS — Z23 NEED FOR IMMUNIZATION AGAINST INFLUENZA: ICD-10-CM

## 2019-09-13 DIAGNOSIS — E78.00 PURE HYPERCHOLESTEROLEMIA: ICD-10-CM

## 2019-09-13 DIAGNOSIS — K59.01 SLOW TRANSIT CONSTIPATION: ICD-10-CM

## 2019-09-13 DIAGNOSIS — R39.15 BENIGN PROSTATIC HYPERPLASIA (BPH) WITH URINARY URGENCY: ICD-10-CM

## 2019-09-13 DIAGNOSIS — K59.09 OTHER CONSTIPATION: ICD-10-CM

## 2019-09-13 DIAGNOSIS — G57.92 NEURITIS OF LEFT LOWER EXTREMITY: ICD-10-CM

## 2019-09-13 ASSESSMENT — MIFFLIN-ST. JEOR: SCORE: 1240.08

## 2019-09-16 ENCOUNTER — COMMUNICATION - HEALTHEAST (OUTPATIENT)
Dept: SCHEDULING | Facility: CLINIC | Age: 69
End: 2019-09-16

## 2019-09-23 ENCOUNTER — OFFICE VISIT - HEALTHEAST (OUTPATIENT)
Dept: FAMILY MEDICINE | Facility: CLINIC | Age: 69
End: 2019-09-23

## 2019-09-23 ENCOUNTER — AMBULATORY - HEALTHEAST (OUTPATIENT)
Dept: LAB | Facility: CLINIC | Age: 69
End: 2019-09-23

## 2019-09-23 DIAGNOSIS — J01.00 ACUTE NON-RECURRENT MAXILLARY SINUSITIS: ICD-10-CM

## 2019-09-23 DIAGNOSIS — E78.00 PURE HYPERCHOLESTEROLEMIA: ICD-10-CM

## 2019-09-23 DIAGNOSIS — R11.2 NAUSEA AND VOMITING, INTRACTABILITY OF VOMITING NOT SPECIFIED, UNSPECIFIED VOMITING TYPE: ICD-10-CM

## 2019-09-23 DIAGNOSIS — R42 DIZZINESS: ICD-10-CM

## 2019-09-23 DIAGNOSIS — R51.9 ACUTE NONINTRACTABLE HEADACHE, UNSPECIFIED HEADACHE TYPE: ICD-10-CM

## 2019-09-23 LAB
CHOLEST SERPL-MCNC: 232 MG/DL
ERYTHROCYTE [SEDIMENTATION RATE] IN BLOOD BY WESTERGREN METHOD: 28 MM/HR (ref 0–15)
FASTING STATUS PATIENT QL REPORTED: NO
HDLC SERPL-MCNC: 39 MG/DL
LDLC SERPL CALC-MCNC: 130 MG/DL
TRIGL SERPL-MCNC: 313 MG/DL

## 2019-09-23 RX ORDER — ONDANSETRON 4 MG/1
4 TABLET, ORALLY DISINTEGRATING ORAL EVERY 8 HOURS PRN
Qty: 12 TABLET | Refills: 0 | Status: SHIPPED | OUTPATIENT
Start: 2019-09-23 | End: 2021-07-09

## 2019-09-23 ASSESSMENT — MIFFLIN-ST. JEOR: SCORE: 1234.03

## 2019-09-24 ENCOUNTER — OFFICE VISIT - HEALTHEAST (OUTPATIENT)
Dept: FAMILY MEDICINE | Facility: CLINIC | Age: 69
End: 2019-09-24

## 2019-09-24 DIAGNOSIS — G89.29 OTHER CHRONIC PAIN: ICD-10-CM

## 2019-09-24 RX ORDER — OXYCODONE AND ACETAMINOPHEN 5; 325 MG/1; MG/1
1 TABLET ORAL EVERY 6 HOURS PRN
Qty: 12 TABLET | Refills: 0 | Status: SHIPPED | OUTPATIENT
Start: 2019-09-24 | End: 2021-07-09

## 2019-10-17 ENCOUNTER — OFFICE VISIT - HEALTHEAST (OUTPATIENT)
Dept: FAMILY MEDICINE | Facility: CLINIC | Age: 69
End: 2019-10-17

## 2019-10-17 DIAGNOSIS — M17.11 PRIMARY OSTEOARTHRITIS OF RIGHT KNEE: ICD-10-CM

## 2019-10-17 DIAGNOSIS — M94.261 CHONDROMALACIA OF KNEE, RIGHT: ICD-10-CM

## 2019-10-17 DIAGNOSIS — M79.604 PAIN OF RIGHT LOWER EXTREMITY: ICD-10-CM

## 2019-10-17 DIAGNOSIS — H10.13 ALLERGIC CONJUNCTIVITIS, BILATERAL: ICD-10-CM

## 2019-10-17 DIAGNOSIS — R09.82 POSTNASAL DRIP: ICD-10-CM

## 2019-10-17 DIAGNOSIS — M48.061 SPINAL STENOSIS, LUMBAR REGION, WITHOUT NEUROGENIC CLAUDICATION: ICD-10-CM

## 2019-10-17 ASSESSMENT — MIFFLIN-ST. JEOR: SCORE: 1245.09

## 2019-10-31 ENCOUNTER — RECORDS - HEALTHEAST (OUTPATIENT)
Dept: ADMINISTRATIVE | Facility: OTHER | Age: 69
End: 2019-10-31

## 2019-12-19 ENCOUNTER — OFFICE VISIT - HEALTHEAST (OUTPATIENT)
Dept: FAMILY MEDICINE | Facility: CLINIC | Age: 69
End: 2019-12-19

## 2019-12-19 DIAGNOSIS — E78.00 PURE HYPERCHOLESTEROLEMIA: ICD-10-CM

## 2019-12-19 DIAGNOSIS — N40.1 BENIGN PROSTATIC HYPERPLASIA (BPH) WITH URINARY URGENCY: ICD-10-CM

## 2019-12-19 DIAGNOSIS — G57.92 NEURITIS OF LEFT LOWER EXTREMITY: ICD-10-CM

## 2019-12-19 DIAGNOSIS — M17.11 PRIMARY OSTEOARTHRITIS OF RIGHT KNEE: ICD-10-CM

## 2019-12-19 DIAGNOSIS — R39.15 BENIGN PROSTATIC HYPERPLASIA (BPH) WITH URINARY URGENCY: ICD-10-CM

## 2019-12-19 DIAGNOSIS — K59.09 OTHER CONSTIPATION: ICD-10-CM

## 2019-12-19 DIAGNOSIS — M48.061 SPINAL STENOSIS, LUMBAR REGION, WITHOUT NEUROGENIC CLAUDICATION: ICD-10-CM

## 2019-12-19 ASSESSMENT — MIFFLIN-ST. JEOR: SCORE: 1266.35

## 2020-01-20 ENCOUNTER — COMMUNICATION - HEALTHEAST (OUTPATIENT)
Dept: GERIATRIC MEDICINE | Facility: CLINIC | Age: 70
End: 2020-01-20

## 2020-01-31 ENCOUNTER — COMMUNICATION - HEALTHEAST (OUTPATIENT)
Dept: GERIATRIC MEDICINE | Facility: CLINIC | Age: 70
End: 2020-01-31

## 2020-02-26 ENCOUNTER — COMMUNICATION - HEALTHEAST (OUTPATIENT)
Dept: GERIATRIC MEDICINE | Facility: CLINIC | Age: 70
End: 2020-02-26

## 2020-03-12 ENCOUNTER — RECORDS - HEALTHEAST (OUTPATIENT)
Dept: ADMINISTRATIVE | Facility: OTHER | Age: 70
End: 2020-03-12

## 2020-03-18 ENCOUNTER — COMMUNICATION - HEALTHEAST (OUTPATIENT)
Dept: GERIATRIC MEDICINE | Facility: CLINIC | Age: 70
End: 2020-03-18

## 2020-04-16 ENCOUNTER — OFFICE VISIT - HEALTHEAST (OUTPATIENT)
Dept: FAMILY MEDICINE | Facility: CLINIC | Age: 70
End: 2020-04-16

## 2020-04-16 DIAGNOSIS — N40.1 BENIGN PROSTATIC HYPERPLASIA (BPH) WITH URINARY URGENCY: ICD-10-CM

## 2020-04-16 DIAGNOSIS — E78.00 PURE HYPERCHOLESTEROLEMIA: ICD-10-CM

## 2020-04-16 DIAGNOSIS — M17.11 PRIMARY OSTEOARTHRITIS OF RIGHT KNEE: ICD-10-CM

## 2020-04-16 DIAGNOSIS — G57.92 NEURITIS OF LEFT LOWER EXTREMITY: ICD-10-CM

## 2020-04-16 DIAGNOSIS — K59.09 OTHER CONSTIPATION: ICD-10-CM

## 2020-04-16 DIAGNOSIS — R39.15 BENIGN PROSTATIC HYPERPLASIA (BPH) WITH URINARY URGENCY: ICD-10-CM

## 2020-04-16 RX ORDER — MELOXICAM 15 MG/1
TABLET ORAL
Refills: 0 | Status: SHIPPED | COMMUNITY
Start: 2019-10-31 | End: 2021-07-09

## 2020-04-16 RX ORDER — ACETAMINOPHEN AND CODEINE PHOSPHATE 300; 30 MG/1; MG/1
TABLET ORAL
Status: SHIPPED | COMMUNITY
Start: 2019-11-08 | End: 2021-07-09

## 2020-04-16 RX ORDER — IBUPROFEN 600 MG/1
TABLET, FILM COATED ORAL
Status: ON HOLD | COMMUNITY
Start: 2019-11-08 | End: 2021-07-15

## 2020-04-16 RX ORDER — CLINDAMYCIN HCL 300 MG
CAPSULE ORAL
Status: SHIPPED | COMMUNITY
Start: 2019-11-08 | End: 2021-07-09

## 2020-04-16 RX ORDER — CHLORHEXIDINE GLUCONATE ORAL RINSE 1.2 MG/ML
SOLUTION DENTAL
Status: SHIPPED | COMMUNITY
Start: 2019-11-08 | End: 2021-07-09

## 2020-06-24 ENCOUNTER — COMMUNICATION - HEALTHEAST (OUTPATIENT)
Dept: GERIATRIC MEDICINE | Facility: CLINIC | Age: 70
End: 2020-06-24

## 2020-06-29 ENCOUNTER — RECORDS - HEALTHEAST (OUTPATIENT)
Dept: ADMINISTRATIVE | Facility: OTHER | Age: 70
End: 2020-06-29

## 2020-06-29 ENCOUNTER — COMMUNICATION - HEALTHEAST (OUTPATIENT)
Dept: FAMILY MEDICINE | Facility: CLINIC | Age: 70
End: 2020-06-29

## 2020-06-29 DIAGNOSIS — R39.15 BENIGN PROSTATIC HYPERPLASIA (BPH) WITH URINARY URGENCY: ICD-10-CM

## 2020-06-29 DIAGNOSIS — N40.1 BENIGN PROSTATIC HYPERPLASIA (BPH) WITH URINARY URGENCY: ICD-10-CM

## 2020-06-30 RX ORDER — TAMSULOSIN HYDROCHLORIDE 0.4 MG/1
CAPSULE ORAL
Qty: 90 CAPSULE | Refills: 3 | Status: SHIPPED | OUTPATIENT
Start: 2020-06-30 | End: 2021-11-04

## 2020-09-24 ENCOUNTER — COMMUNICATION - HEALTHEAST (OUTPATIENT)
Dept: FAMILY MEDICINE | Facility: CLINIC | Age: 70
End: 2020-09-24

## 2020-09-24 DIAGNOSIS — G57.92 NEURITIS OF LEFT LOWER EXTREMITY: ICD-10-CM

## 2020-10-20 ENCOUNTER — COMMUNICATION - HEALTHEAST (OUTPATIENT)
Dept: FAMILY MEDICINE | Facility: CLINIC | Age: 70
End: 2020-10-20

## 2020-10-20 DIAGNOSIS — R09.82 POSTNASAL DRIP: ICD-10-CM

## 2020-11-05 ENCOUNTER — COMMUNICATION - HEALTHEAST (OUTPATIENT)
Dept: GERIATRIC MEDICINE | Facility: CLINIC | Age: 70
End: 2020-11-05

## 2020-11-06 ENCOUNTER — COMMUNICATION - HEALTHEAST (OUTPATIENT)
Dept: GERIATRIC MEDICINE | Facility: CLINIC | Age: 70
End: 2020-11-06

## 2020-11-10 ENCOUNTER — COMMUNICATION - HEALTHEAST (OUTPATIENT)
Dept: GERIATRIC MEDICINE | Facility: CLINIC | Age: 70
End: 2020-11-10

## 2020-11-12 ENCOUNTER — COMMUNICATION - HEALTHEAST (OUTPATIENT)
Dept: GERIATRIC MEDICINE | Facility: CLINIC | Age: 70
End: 2020-11-12

## 2020-11-16 ASSESSMENT — ACTIVITIES OF DAILY LIVING (ADL)
DEPENDENT_IADLS:: CLEANING;COOKING;LAUNDRY;SHOPPING;MEAL PREPARATION;MEDICATION MANAGEMENT;MONEY MANAGEMENT;TRANSPORTATION;INCONTINENCE

## 2020-11-17 ENCOUNTER — OFFICE VISIT - HEALTHEAST (OUTPATIENT)
Dept: FAMILY MEDICINE | Facility: CLINIC | Age: 70
End: 2020-11-17

## 2020-11-17 ENCOUNTER — COMMUNICATION - HEALTHEAST (OUTPATIENT)
Dept: GERIATRIC MEDICINE | Facility: CLINIC | Age: 70
End: 2020-11-17

## 2020-11-17 DIAGNOSIS — K59.09 OTHER CONSTIPATION: ICD-10-CM

## 2020-11-17 DIAGNOSIS — M54.41 CHRONIC RIGHT-SIDED LOW BACK PAIN WITH RIGHT-SIDED SCIATICA: ICD-10-CM

## 2020-11-17 DIAGNOSIS — G57.92 NEURITIS OF LEFT LOWER EXTREMITY: ICD-10-CM

## 2020-11-17 DIAGNOSIS — R09.82 POSTNASAL DRIP: ICD-10-CM

## 2020-11-17 DIAGNOSIS — G89.29 CHRONIC RIGHT-SIDED LOW BACK PAIN WITH RIGHT-SIDED SCIATICA: ICD-10-CM

## 2020-11-17 DIAGNOSIS — Z23 ENCOUNTER TO VACCINATE PATIENT: ICD-10-CM

## 2020-11-17 RX ORDER — POLYETHYLENE GLYCOL 3350 17 G/17G
POWDER, FOR SOLUTION ORAL
Qty: 527 G | Refills: 5 | Status: SHIPPED | OUTPATIENT
Start: 2020-11-17 | End: 2021-07-09

## 2020-11-17 RX ORDER — CETIRIZINE HYDROCHLORIDE 10 MG/1
TABLET ORAL
Qty: 90 TABLET | Refills: 1 | Status: SHIPPED | OUTPATIENT
Start: 2020-11-17 | End: 2022-06-02

## 2020-12-20 ENCOUNTER — COMMUNICATION - HEALTHEAST (OUTPATIENT)
Dept: FAMILY MEDICINE | Facility: CLINIC | Age: 70
End: 2020-12-20

## 2020-12-20 DIAGNOSIS — M17.11 PRIMARY OSTEOARTHRITIS OF RIGHT KNEE: ICD-10-CM

## 2020-12-20 DIAGNOSIS — M48.061 SPINAL STENOSIS, LUMBAR REGION, WITHOUT NEUROGENIC CLAUDICATION: ICD-10-CM

## 2020-12-20 DIAGNOSIS — E78.00 PURE HYPERCHOLESTEROLEMIA: ICD-10-CM

## 2020-12-22 RX ORDER — DULOXETIN HYDROCHLORIDE 60 MG/1
60 CAPSULE, DELAYED RELEASE ORAL DAILY
Qty: 90 CAPSULE | Refills: 3 | Status: SHIPPED | OUTPATIENT
Start: 2020-12-22 | End: 2021-11-04

## 2020-12-22 RX ORDER — SIMVASTATIN 20 MG
20 TABLET ORAL EVERY EVENING
Qty: 90 TABLET | Refills: 3 | Status: SHIPPED | OUTPATIENT
Start: 2020-12-22 | End: 2021-11-04

## 2021-01-13 ENCOUNTER — OFFICE VISIT - HEALTHEAST (OUTPATIENT)
Dept: FAMILY MEDICINE | Facility: CLINIC | Age: 71
End: 2021-01-13

## 2021-01-13 DIAGNOSIS — N40.1 BENIGN PROSTATIC HYPERPLASIA (BPH) WITH URINARY URGENCY: ICD-10-CM

## 2021-01-13 DIAGNOSIS — G57.92 NEURITIS OF LEFT LOWER EXTREMITY: ICD-10-CM

## 2021-01-13 DIAGNOSIS — H53.9 VISUAL DISTURBANCE: ICD-10-CM

## 2021-01-13 DIAGNOSIS — E78.00 PURE HYPERCHOLESTEROLEMIA: ICD-10-CM

## 2021-01-13 DIAGNOSIS — R39.15 BENIGN PROSTATIC HYPERPLASIA (BPH) WITH URINARY URGENCY: ICD-10-CM

## 2021-01-13 DIAGNOSIS — Z00.00 ROUTINE GENERAL MEDICAL EXAMINATION AT A HEALTH CARE FACILITY: ICD-10-CM

## 2021-01-13 DIAGNOSIS — M48.061 SPINAL STENOSIS, LUMBAR REGION, WITHOUT NEUROGENIC CLAUDICATION: ICD-10-CM

## 2021-01-13 LAB
ANION GAP SERPL CALCULATED.3IONS-SCNC: 11 MMOL/L (ref 5–18)
BUN SERPL-MCNC: 18 MG/DL (ref 8–22)
CALCIUM SERPL-MCNC: 9.2 MG/DL (ref 8.5–10.5)
CHLORIDE BLD-SCNC: 104 MMOL/L (ref 98–107)
CHOLEST SERPL-MCNC: 266 MG/DL
CO2 SERPL-SCNC: 26 MMOL/L (ref 22–31)
CREAT SERPL-MCNC: 0.99 MG/DL (ref 0.7–1.3)
FASTING STATUS PATIENT QL REPORTED: YES
GFR SERPL CREATININE-BSD FRML MDRD: >60 ML/MIN/1.73M2
GLUCOSE BLD-MCNC: 106 MG/DL (ref 70–125)
HCV AB SERPL QL IA: NEGATIVE
HDLC SERPL-MCNC: 61 MG/DL
LDLC SERPL CALC-MCNC: 181 MG/DL
POTASSIUM BLD-SCNC: 4.5 MMOL/L (ref 3.5–5)
SODIUM SERPL-SCNC: 141 MMOL/L (ref 136–145)
TRIGL SERPL-MCNC: 121 MG/DL

## 2021-01-13 ASSESSMENT — MIFFLIN-ST. JEOR: SCORE: 1272.02

## 2021-01-27 ENCOUNTER — COMMUNICATION - HEALTHEAST (OUTPATIENT)
Dept: FAMILY MEDICINE | Facility: CLINIC | Age: 71
End: 2021-01-27

## 2021-01-27 DIAGNOSIS — G57.92 NEURITIS OF LEFT LOWER EXTREMITY: ICD-10-CM

## 2021-01-28 RX ORDER — GABAPENTIN 300 MG/1
CAPSULE ORAL
Qty: 810 CAPSULE | Refills: 3 | Status: SHIPPED | OUTPATIENT
Start: 2021-01-28 | End: 2022-03-02

## 2021-02-25 ENCOUNTER — RECORDS - HEALTHEAST (OUTPATIENT)
Dept: ADMINISTRATIVE | Facility: OTHER | Age: 71
End: 2021-02-25

## 2021-05-27 NOTE — TELEPHONE ENCOUNTER
Refill Approved    Rx renewed per Medication Renewal Policy. Medication was last renewed on 6/6/18.    Chantale Ferro, [facility] Triage/Med Refill 4/5/2019     Requested Prescriptions   Pending Prescriptions Disp Refills     cetirizine (ZYRTEC) 10 MG tablet [Pharmacy Med Name: CETIRIZINE HCL 10 MG TABLET 10 TAB] 90 tablet 3     Sig: TAKE 1 PILL BY MOUTH EVERYDAY FOR ALLERGY    Antihistamine Refill Protocol Passed - 4/4/2019  9:25 AM       Passed - Patient has had office visit/physical in last year    Last office visit with prescriber/PCP: 2/19/2019 Shree Dyson MD OR same dept: 2/19/2019 Shree Dyson MD OR same specialty: 2/19/2019 Shree Dyson MD  Last physical: 3/12/2019 Last MTM visit: Visit date not found   Next visit within 3 mo: Visit date not found  Next physical within 3 mo: Visit date not found  Prescriber OR PCP: Shree Dyson MD  Last diagnosis associated with med order: 1. Postnasal drip  - cetirizine (ZYRTEC) 10 MG tablet [Pharmacy Med Name: CETIRIZINE HCL 10 MG TABLET 10 TAB]; TAKE 1 PILL BY MOUTH EVERYDAY FOR ALLERGY  Dispense: 90 tablet; Refill: 3    If protocol passes may refill for 12 months if within 3 months of last provider visit (or a total of 15 months).

## 2021-05-29 NOTE — TELEPHONE ENCOUNTER
oxybutynin (DITROPAN XL) 5 MG ER tablet [[id number]]     Electronically signed by: Chantale Ferro RN on 05/22/18 2032 Status: Discontinued   Ordering user: Chantale Ferro RN 05/22/18 2032 Ordering provider: Shree Dyson MD   Authorized by: Shree Dyson MD   Frequency:  05/22/18 - 03/13/19  Discontinued by: Shree Dyson MD 03/13/19 0757 [Therapy completed]     Chantale Ferro RN [facility] Triage/Medication Refill

## 2021-05-29 NOTE — PROGRESS NOTES
ASSESSMENT  And plan       1. Primary osteoarthritis of right knee currently taking Voltaren wear his knee brace when he has excessive pain states that the medication does help and allows him to sleep and walk for short distances is not interested in seeing orthopedics.    2. Lumbar Canal Stenosis patient is currently taking gabapentin which causes some constipation, he is taking his  fiber supplement.  And taking Cymbalta he has some tenderness over the left iliac crest but no numbness is noted    3. Urge incontinence of urine no symptoms noted at this time    4. Benign prostatic hyperplasia (BPH) with urinary urgency symptoms are stable he is not getting up at night to urinate me       5.  Cologuard test ordered for cancer screening    6.  Constipation  Polyethylene glycol ordered for constipation    7.  Allergic conjunctivitis bilateral  I have given him eyedrops for his conjunctivitis    8.  Neuritis of left lower extremity  Continue gabapentin for current symptoms      There are no Patient Instructions on file for this visit.    No orders of the defined types were placed in this encounter.    There are no discontinued medications.    No follow-ups on file.    CHIEF COMPLAINT:  No chief complaint on file.      HISTORY OF PRESENT ILLNESS:  Mariusz is a 69 y.o. male is here for follow-up, his medical history significant for BPH, lumbar canal stenosis and resulting neuralgia and right knee pain.  He is here as a family member reports that his well is been taking his medications which do help with his knee pain and also help with the lumbar canal stenosis.  He recently was seen at the eye clinic and they told him that his prescription has not changed he does need some new drops for allergic conjunctivitis but reports that he has been able to walk outside.  He is been sleeping well.    REVIEW OF SYSTEMS:     HEENT positive for bilateral itchy eyes  Musculoskeletal positive for intermittent right knee pain  According to  patient 10 point review of  All other systems are negative.    PFSH:    Not working at this time    TOBACCO USE:  Social History     Tobacco Use   Smoking Status Never Smoker   Smokeless Tobacco Former User     Types: Chew   Tobacco Comment    chews betel nut       VITALS:  There were no vitals filed for this visit.  Wt Readings from Last 3 Encounters:   03/12/19 143 lb 8 oz (65.1 kg)   02/19/19 144 lb (65.3 kg)   02/09/19 145 lb (65.8 kg)       PHYSICAL EXAM:  Interactive elderly male sitting comfortably exam room no acute distress  HEENT neck supple mucous members moist, oral cavity shows no exudate no erythema pupils equal reactive to light mild conjunctival injection noted in the medial canthus of the right eye  Musculoskeletal system no tenderness noted when I palpate his lumbar spine is tenderness over the left SI joint he is tenderness to the right SI joint.  He has some tenderness over the right buttock.  No tenderness elicited when I palpate his right patella  Neuro flexion of his right knee it is 30 degrees causes him pain which shoots down into his right Achilles tendon  Psych oriented x3 not agitated    DATA REVIEWED:  Additional History from Old Records Summarized (2): 0  Decision to Obtain Records (1): 1  Radiology Tests Summarized or Ordered (1): 0  Labs Reviewed or Ordered (1): 0  Medicine Test Summarized or Ordered (1): 0  Independent Review of EKG or X-RAY(2 each): 0    The visit lasted a total of 25 minutes face to face with the patient. Over 50% of the time was spent counseling and educating the patient about   Conjunctivitis, osteoarthritis, lumbar canal stenosis..    MEDICATIONS:  Current Outpatient Medications   Medication Sig Dispense Refill     acetaminophen (TYLENOL) 325 MG tablet Take 2 tablets (650 mg total) by mouth every 6 (six) hours as needed. 100 tablet 12     bromfenac (BROMDAY) 0.09 % ophthalmic solution Apply 2 drops each daily 5 mL 5     cetirizine (ZYRTEC) 10 MG tablet TAKE 1  PILL BY MOUTH EVERYDAY FOR ALLERGY 90 tablet 3     cetirizine (ZYRTEC) 10 MG tablet TAKE 1 PILL BY MOUTH EVERYDAY FOR ALLERGY 90 tablet 3     DULoxetine (CYMBALTA) 60 MG capsule Take 1 capsule (60 mg total) by mouth daily. 30 capsule 9     gabapentin (NEURONTIN) 300 MG capsule Use 3 capsules three days daily 270 capsule 3     polyethylene glycol (GLYCOLAX) 17 gram/dose powder MIX 17 GRAMS IN 8 OZ WATER AND DRINK BY MOUTH DAILY 527 g 5     tamsulosin (FLOMAX) 0.4 mg Cp24 TAKE 1 CAPSULE (0.4 MG TOTAL) BY MOUTH DAILY AFTER LUNCH. 30 capsule 11     VITAMIN D2 50,000 unit capsule TAKE 1 CAPSULE (50,000 UNITS TOTAL) BY MOUTH ONCE A WEEK. 4 capsule 3     No current facility-administered medications for this visit.      .Please note that this clinical encounter uses voice recognition software, there may be typographical errors present

## 2021-05-29 NOTE — TELEPHONE ENCOUNTER
Medication Question or Clarification  Who is calling: Pharmacy: Keren with [facility], [phone]  What medication are you calling about?     (include dose and sig)   diclofenac (VOLTAREN) 50 MG EC tablet 60 tablet 9 6/12/2019     Sig - Route: Take 1 tablet (50 mg total) by mouth daily. - Oral    Sent to pharmacy as: diclofenac (VOLTAREN) 50 MG EC tablet     Patient has Hx of taking 2 tablets of the above medication per day.  Is the 1 tablet per day correct?        gabapentin (NEURONTIN) 300 MG capsule 270 capsule 3 6/12/2019     Sig: Use 3 capsules three days daily    Sent to pharmacy as: gabapentin (NEURONTIN) 300 MG capsule    Pharmacy states sig reads Use 3 capsules three days daily.  Is sig supposed to read 3 times daily?     Who prescribed the medication?Shree Dyson MD    What is your question/concern?: Dosage on above medications.  Pharmacy: [facility]  Okay to leave a detailed message?: Yes  Site CMT - Please call the pharmacy to obtain any additional needed information.

## 2021-05-29 NOTE — TELEPHONE ENCOUNTER
Patient wife notified per MD note below. The patient representative verbalizes understanding of provider/CSS instructions for follow-up and continued care per provider message.

## 2021-05-30 VITALS — BODY MASS INDEX: 27.94 KG/M2 | WEIGHT: 143.06 LBS

## 2021-05-30 VITALS — HEIGHT: 60 IN | BODY MASS INDEX: 29.37 KG/M2 | BODY MASS INDEX: 28.88 KG/M2 | WEIGHT: 147.9 LBS

## 2021-05-30 VITALS — WEIGHT: 144.5 LBS | BODY MASS INDEX: 28.22 KG/M2

## 2021-05-30 VITALS — BODY MASS INDEX: 29.01 KG/M2 | WEIGHT: 146.06 LBS

## 2021-05-30 NOTE — PROGRESS NOTES
ASSESSMENT  And plan  1. Primary osteoarthritis of left knee  Patient is not experiencing pain in the left knee.  He will try elevating the left knee icing it regularly if he has no relief from his symptoms I will attempt a corticosteroid injection form he has had steroid shots done for him by orthopedics in the past.    2. Stress incontinence of urine    Medication is working no side effects noted.  Continue current dosage    3. Lumbar Canal Stenosis    Today he had no back pain on examination the Loxitane Voltaren and gabapentin are working    4. Constipation, unspecified constipation type    No constipation noted      There are no Patient Instructions on file for this visit.    No orders of the defined types were placed in this encounter.    Medications Discontinued During This Encounter   Medication Reason     cetirizine (ZYRTEC) 10 MG tablet Duplicate order       No follow-ups on file.    CHIEF COMPLAINT:  Chief Complaint   Patient presents with     Body sore       HISTORY OF PRESENT ILLNESS:  Mariusz is a 69 y.o. male is here for follow-up, he has medical history significant for lumbar canal stenosis right-sided knee pain urinary incontinence and constipation is been noticing increasing left knee pain and swelling in his feet for the last week.  His left knee is actually hurting more than his right he denies any recent falls he states he is taking all his medication the pain is a throbbing type of pain that makes it difficult to walk    REVIEW OF SYSTEMS:   Musculoskeletal positive for bilateral knee pain, her left knee is worse than the right.  Occasional back pain noted  According to patient 10 point review of  All other systems are negative.    PFSH:    Lives at home with his family    TOBACCO USE:  Social History     Tobacco Use   Smoking Status Never Smoker   Smokeless Tobacco Former User     Types: Chew   Tobacco Comment    chews betel nut       VITALS:  Vitals:    07/17/19 1642   BP: 116/74   Pulse: 67  "  Resp: 16   Temp: 98  F (36.7  C)   TempSrc: Oral   SpO2: 97%   Weight: 141 lb (64 kg)   Height: 4' 11.72\" (1.517 m)     Wt Readings from Last 3 Encounters:   07/17/19 141 lb (64 kg)   06/12/19 140 lb 5 oz (63.6 kg)   03/12/19 143 lb 8 oz (65.1 kg)       PHYSICAL EXAM:  Interactive elderly male sitting comfortably in exam no acute distress  HEENT neck supple mucous membranes moist, no lymph enlargement noted neck  Respiratory system clear to auscultation equal breath sounds no wheezes no crackles  CVS regular rate and rhythm no murmurs rubs or gallops no pedal edema noted  Musculoskeletal system tenderness elicited over the left patella.  Anterior and posterior drawer tests are negative Lachman's test is negative.  Neuro cranial nerves II to XII intact gait is normal reflexes are brisk  DATA REVIEWED:  Additional History from Old Records Summarized (2): 0  Decision to Obtain Records (1): 0  Radiology Tests Summarized or Ordered (1): 0  Labs Reviewed or Ordered (1): 0  Medicine Test Summarized or Ordered (1): 0  Independent Review of EKG or X-RAY(2 each): 0    The visit lasted a total of 25 minutes face to face with the patient. Over 50% of the time was spent counseling and educating the patient about   Urinary incontinence, osteoarthritis, constipation, lumbar canal stenosis..    MEDICATIONS:  Current Outpatient Medications   Medication Sig Dispense Refill     cetirizine (ZYRTEC) 10 MG tablet TAKE 1 PILL BY MOUTH EVERYDAY FOR ALLERGY 90 tablet 3     diclofenac (VOLTAREN) 50 MG EC tablet Take 1 tablet (50 mg total) by mouth daily. 60 tablet 9     DULoxetine (CYMBALTA) 60 MG capsule Take 1 capsule (60 mg total) by mouth daily. 30 capsule 9     gabapentin (NEURONTIN) 300 MG capsule Use 3 capsules three days daily 270 capsule 3     ketotifen (ZADITOR/ZYRTEC ITCHY EYES) 0.025 % (0.035 %) ophthalmic solution Place 1 drop in each eye daily 5 mL 5     polyethylene glycol (GLYCOLAX) 17 gram/dose powder MIX 17 GRAMS IN 8 OZ " WATER AND DRINK BY MOUTH DAILY 527 g 5     tamsulosin (FLOMAX) 0.4 mg cap TAKE 1 CAPSULE (0.4 MG TOTAL) BY MOUTH DAILY AFTER LUNCH. 30 capsule 11     acetaminophen (TYLENOL) 325 MG tablet Take 2 tablets (650 mg total) by mouth every 6 (six) hours as needed. 100 tablet 12     bromfenac (BROMDAY) 0.09 % ophthalmic solution Apply 2 drops each daily 5 mL 5     VITAMIN D2 50,000 unit capsule TAKE 1 CAPSULE (50,000 UNITS TOTAL) BY MOUTH ONCE A WEEK. 4 capsule 3     No current facility-administered medications for this visit.        Please note that this clinical encounter uses voice recognition software, there may be typographical errors present

## 2021-05-30 NOTE — PROGRESS NOTES
ASSESSMENT AND PLAN:  1. Neuritis of left lower extremity    - gabapentin (NEURONTIN) 300 MG capsule; Use 3 capsules three days daily  Dispense: 270 capsule; Refill: 3  - diclofenac (VOLTAREN) 50 MG EC tablet; Take 1 tablet (50 mg total) by mouth daily.  Dispense: 60 tablet; Refill: 9    2. Primary osteoarthritis of right knee    - DULoxetine (CYMBALTA) 60 MG capsule; Take 1 capsule (60 mg total) by mouth daily.  Dispense: 30 capsule; Refill: 9  - diclofenac (VOLTAREN) 50 MG EC tablet; Take 1 tablet (50 mg total) by mouth daily.  Dispense: 60 tablet; Refill: 9    3. Lumbar Canal Stenosis    - DULoxetine (CYMBALTA) 60 MG capsule; Take 1 capsule (60 mg total) by mouth daily.  Dispense: 30 capsule; Refill: 9  - diclofenac (VOLTAREN) 50 MG EC tablet; Take 1 tablet (50 mg total) by mouth daily.  Dispense: 60 tablet; Refill: 9    4. Postnasal drip    - cetirizine (ZYRTEC) 10 MG tablet; TAKE 1 PILL BY MOUTH EVERYDAY FOR ALLERGY  Dispense: 90 tablet; Refill: 3            No orders of the defined types were placed in this encounter.    Medications Discontinued During This Encounter   Medication Reason     gabapentin (NEURONTIN) 300 MG capsule Reorder     DULoxetine (CYMBALTA) 60 MG capsule Reorder     cetirizine (ZYRTEC) 10 MG tablet Reorder     diclofenac (VOLTAREN) 50 MG EC tablet Reorder       Return in about 5 weeks (around 8/29/2019).    CHIEF COMPLAINT:  Chief Complaint   Patient presents with     Knee Pain     Both       HISTORY OF PRESENT ILLNESS:  Mariusz is a 69 y.o. male with hyperlipidemia, diverticulosis, low back pain with lumbar canal stenosis, neuralgia, BPH, and bilateral knee osteoarthritis, who presents to the clinic today for follow up on bilateral knee pain. Mariusz is present with a Nettie . I saw him a week ago for left knee pain in addition to right knee pain, the latter of which is worse. He has been icing and elevating his knees with slight improvement. The patient continues on gabapentin,  "duloxetine, and Voltaren for his low back and knee pain. It does not bother him at night, and he is able to sleep fine. Mariusz uses his knee brace for walking longer distances. He notices some associated swelling with walking.    I reviewed that his creatinine was last checked four months ago. The patient denies any abnormal weight gain.     He denies any difficulty with urination. His sleep has been fine.    REVIEW OF SYSTEMS:   General: No abnormal weight gain.  Musculoskeletal: Bilateral knee pain with associated swelling with walking.   : No urination issues.  Neuro: No sleep disturbance.  All other systems are negative.    PFSH:  He lives at home with his family. Reviewed as below.     TOBACCO USE:  Social History     Tobacco Use   Smoking Status Never Smoker   Smokeless Tobacco Former User     Types: Chew   Tobacco Comment    chews betel nut       VITALS:  Vitals:    07/25/19 1442   BP: 130/68   Pulse: 81   Resp: 20   Temp: 98.2  F (36.8  C)   TempSrc: Oral   SpO2: 96%   Weight: 139 lb 2 oz (63.1 kg)   Height: 4' 11.72\" (1.517 m)     Wt Readings from Last 3 Encounters:   07/25/19 139 lb 2 oz (63.1 kg)   07/17/19 141 lb (64 kg)   06/12/19 140 lb 5 oz (63.6 kg)     Body mass index is 27.43 kg/m .      PHYSICAL EXAM:  General: Alert, cooperative, no distress, appears stated age  HEENT: Normocephalic, without obvious abnormality, atraumatic, moist mucous membranes  Eyes: PERRL, conjunctiva/cornea clear, EOM's intact  Lungs: Clear to auscultation bilaterally, respirations unlabored  Heart: Regular rate and rhythm, S1 and S2 normal, no murmur, rub, or gallop  Musculoskeletal: Anterior and posterior drawer tests are negative, Lachman's negative, swelling in left ankle and dorsum of left foot  Neurologic:  A & O x 3.  No tremor, no focal findings.  Normal gait.     DATA REVIEWED:  Additional History from Old Records Summarized (2): none  Decision to Obtain Records (1): none  Radiology Tests Summarized or Ordered (1): " none  Labs Reviewed or Ordered (1): 02/09/2019 CMP reviewed with normal creatinine.  Medicine Test Summarized or Ordered (1): none  Independent Review of EKG or X-RAY(2 each): none      I, Aviva Claros, am scribing for and in the presence of, Dr. Dyson.    I, Dr. Dyson, personally performed the services described in this documentation, as scribed by Aviva Claros in my presence, and it is both accurate and complete.      MEDICATIONS:  Current Outpatient Medications   Medication Sig Dispense Refill     cetirizine (ZYRTEC) 10 MG tablet TAKE 1 PILL BY MOUTH EVERYDAY FOR ALLERGY 90 tablet 3     diclofenac (VOLTAREN) 50 MG EC tablet Take 1 tablet (50 mg total) by mouth daily. 60 tablet 9     DULoxetine (CYMBALTA) 60 MG capsule Take 1 capsule (60 mg total) by mouth daily. 30 capsule 9     gabapentin (NEURONTIN) 300 MG capsule Use 3 capsules three days daily 270 capsule 3     ketotifen (ZADITOR/ZYRTEC ITCHY EYES) 0.025 % (0.035 %) ophthalmic solution Place 1 drop in each eye daily 5 mL 5     polyethylene glycol (GLYCOLAX) 17 gram/dose powder MIX 17 GRAMS IN 8 OZ WATER AND DRINK BY MOUTH DAILY 527 g 5     tamsulosin (FLOMAX) 0.4 mg cap TAKE 1 CAPSULE (0.4 MG TOTAL) BY MOUTH DAILY AFTER LUNCH. 30 capsule 11     acetaminophen (TYLENOL) 325 MG tablet Take 2 tablets (650 mg total) by mouth every 6 (six) hours as needed. 100 tablet 12     bromfenac (BROMDAY) 0.09 % ophthalmic solution Apply 2 drops each daily 5 mL 5     VITAMIN D2 50,000 unit capsule TAKE 1 CAPSULE (50,000 UNITS TOTAL) BY MOUTH ONCE A WEEK. 4 capsule 3     No current facility-administered medications for this visit.    Pulmonary time spent with the patient today was 25 minutes greater than 50% of this time was spent discussing pain and other chronic conditions.    Total Data Points: 1    Please note that this clinical encounter uses voice recognition software, there may be typographical errors present

## 2021-05-30 NOTE — TELEPHONE ENCOUNTER
Refill refused.     Tamsulosin Filled on 6/12/19  #30 R-11. Receipt confirmed by pharmacy @ 5294.   Cetirizine Filled on 4/5/19  #90 R-3. Receipt confirmed by pharmacy @ [id number].     Gloria Jasso RN Triage Nurse Advisor [facility]

## 2021-05-31 VITALS — BODY MASS INDEX: 28.27 KG/M2 | WEIGHT: 142.38 LBS

## 2021-05-31 VITALS — BODY MASS INDEX: 27.33 KG/M2 | HEIGHT: 60 IN | WEIGHT: 139.2 LBS

## 2021-05-31 VITALS — BODY MASS INDEX: 27.57 KG/M2 | WEIGHT: 138.8 LBS

## 2021-05-31 VITALS — BODY MASS INDEX: 27.41 KG/M2 | WEIGHT: 138 LBS

## 2021-05-31 VITALS — BODY MASS INDEX: 27.6 KG/M2 | WEIGHT: 139 LBS

## 2021-05-31 VITALS — WEIGHT: 139 LBS | BODY MASS INDEX: 27.6 KG/M2

## 2021-05-31 VITALS — WEIGHT: 142.06 LBS | BODY MASS INDEX: 28.21 KG/M2

## 2021-06-01 VITALS — WEIGHT: 144.38 LBS | HEIGHT: 60 IN | BODY MASS INDEX: 28.35 KG/M2

## 2021-06-01 VITALS — WEIGHT: 142 LBS | BODY MASS INDEX: 27.73 KG/M2

## 2021-06-01 VITALS — WEIGHT: 146 LBS | BODY MASS INDEX: 28.99 KG/M2

## 2021-06-01 VITALS — WEIGHT: 147 LBS | BODY MASS INDEX: 28.71 KG/M2

## 2021-06-01 VITALS — BODY MASS INDEX: 28.73 KG/M2 | WEIGHT: 146.31 LBS | HEIGHT: 60 IN

## 2021-06-01 VITALS — WEIGHT: 147.2 LBS | HEIGHT: 60 IN | BODY MASS INDEX: 28.9 KG/M2

## 2021-06-01 VITALS — BODY MASS INDEX: 28.51 KG/M2 | WEIGHT: 146 LBS

## 2021-06-01 NOTE — PROGRESS NOTES
Chief Complaint   Patient presents with     Leg Pain     bilateral leg pain, pain changed about 1 wk ago (throbbing pain x1 wk, unable to sleep),        HPI:  Mariusz Hebert is a 69 y.o. male who presents today complaining of bilateral leg pain and low back pain for the past 1 week.  Pain has been a throbbing sensation in his knees and lower legs. On his left leg the pain radiates from the buttock.  No known injury. He had a low back surgery about 7 years ago. He take 900 mg of Gabapentin three times a day. This pain is chronic, but worsening. He denies any weakness, loss of control of bowel or bladder function, leg swelling, rashes, or redness. Patient has been started on Tramadol, but he had to D/C due to side effects.     History obtained from the patient.    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, right  2016-01: Pain of right lower extremity  Diverticulosis  Benign prostatic hyperplasia (BPH) with urinary urgency  Drip Or Drainage Down Throat From Above  Constipation  Cervicalgia  Urinary Incontinence  Atopic Dermatitis  Lumbar Canal Stenosis  Neuritis of left lower extremity  Lower Back Pain      Past Medical History:   Diagnosis Date     Constipation     Created by Conversion      Lower Back Pain     Created by Conversion        Social History     Tobacco Use     Smoking status: Never Smoker     Smokeless tobacco: Former User     Types: Chew     Tobacco comment: chews betel nut   Substance Use Topics     Alcohol use: No     Frequency: Never       Review of Systems   Constitutional: Negative for chills, fever and unexpected weight change.   Cardiovascular: Negative for leg swelling.   Genitourinary: Negative  for enuresis.   Musculoskeletal: Positive for arthralgias (bilateral LE pain) and back pain. Negative for gait problem.   Skin: Negative for color change and rash.   Neurological: Negative for weakness and numbness.       Vitals:    09/24/19 1249   BP: 111/71   Pulse: 91   Resp: 20   Temp: 98.5  F (36.9  C)   TempSrc: Oral   SpO2: 95%   Weight: 140 lb 3.2 oz (63.6 kg)       Physical Exam  Constitutional:       General: He is not in acute distress.     Appearance: He is well-developed. He is not diaphoretic.      Comments: Patient walks with the aid of a cane.   HENT:      Head: Normocephalic and atraumatic.      Right Ear: External ear normal.      Left Ear: External ear normal.   Eyes:      General:         Right eye: No discharge.         Left eye: No discharge.      Conjunctiva/sclera: Conjunctivae normal.   Cardiovascular:      Rate and Rhythm: Normal rate and regular rhythm.      Heart sounds: Normal heart sounds.   Pulmonary:      Effort: Pulmonary effort is normal. No respiratory distress.      Breath sounds: Normal breath sounds.   Musculoskeletal:      Comments: Knees are NTTP. No Tenderness on the posterior calfs.    Skin:     General: Skin is warm.      Findings: No erythema, rash or wound.   Neurological:      Motor: No weakness or atrophy.   Psychiatric:         Behavior: Behavior normal.         Thought Content: Thought content normal.         Judgement: Judgment normal.         Clinical Decision Making:  Lower extremity pain is chronic, but not well controlled. Patient is already on high dose of gabapentin. Recommend follow up with PCP. Patient was given small amount of Percocet for break through pain to last him until his follow up appointment. No PE findings indicative of DVT, or cauda equina syndrome.   At the end of the encounter, I discussed results, diagnosis, medications. Discussed red flags for immediate return to clinic/ER, as well as indications for follow up if no improvement. Patient  understood and agreed to plan. Patient was stable for discharge.    1. Other chronic pain  oxyCODONE-acetaminophen (PERCOCET/ENDOCET) 5-325 mg per tablet         Patient Instructions   1. Follow up regarding chronic pain in the legs on 10/11/19.   2. May use Percocet as needed for breakthrough pain. Use sparingly.

## 2021-06-01 NOTE — PROGRESS NOTES
ASSESSMENT AND PLAN:  1. Pure hypercholesterolemia  Lipid profile to be drawn we will discuss his next visit  - Lipid Profile    2. Primary osteoarthritis of right knee    He continues to have right-sided knee pain no appreciable improvement noted with the Voltaren   Tramadol started follow-up in 4 weeks  - traMADol (ULTRAM) 50 mg tablet; Take 1 tablet (50 mg total) by mouth 2 (two) times a day.  Dispense: 60 tablet; Refill: 0  - DULoxetine (CYMBALTA) 60 MG capsule; Take 1 capsule (60 mg total) by mouth daily.  Dispense: 30 capsule; Refill: 9    3. Lumbar Canal Stenosis    Continue duloxetine he has numbness that radiates down into his leg however no pain is noted his right lower extremity  - traMADol (ULTRAM) 50 mg tablet; Take 1 tablet (50 mg total) by mouth 2 (two) times a day.  Dispense: 60 tablet; Refill: 0  - DULoxetine (CYMBALTA) 60 MG capsule; Take 1 capsule (60 mg total) by mouth daily.  Dispense: 30 capsule; Refill: 9    4. Neuritis of left lower extremity  Continue gabapentin no side effects noted  - gabapentin (NEURONTIN) 300 MG capsule; Use 3 capsules three days daily  Dispense: 270 capsule; Refill: 3    5. Benign prostatic hyperplasia (BPH) with urinary urgency  Tolerating therapy.    6. Slow transit constipation      7. Need for immunization against influenza    - Influenza High Dose, Seasonal 65+ yrs    8. Other constipation    - polyethylene glycol (GLYCOLAX) 17 gram/dose powder; MIX 17 GRAMS IN 8 OZ WATER AND DRINK BY MOUTH DAILY  Dispense: 527 g; Refill: 5            Orders Placed This Encounter   Procedures     Influenza High Dose, Seasonal 65+ yrs     Lipid Profile     Order Specific Question:   Fasting is required?     Answer:   Yes     Medications Discontinued During This Encounter   Medication Reason     diclofenac (VOLTAREN) 50 MG EC tablet Alternate therapy     polyethylene glycol (GLYCOLAX) 17 gram/dose powder Reorder     gabapentin (NEURONTIN) 300 MG capsule Reorder     DULoxetine  "(CYMBALTA) 60 MG capsule Reorder       No follow-ups on file.    CHIEF COMPLAINT:  Chief Complaint   Patient presents with     Hyperlipidemia       HISTORY OF PRESENT ILLNESS:  Mariusz is a 69 y.o. male with hyperlipidemia, diverticulosis, low back pain with lumbar canal stenosis, neuralgia, BPH, and bilateral knee osteoarthritis, who presents to the clinic today for follow up on bilateral knee pain and hyperlipidemia. Mariusz is present with a Nettie . His bilateral knee pain is rated at 8/10, right greater than left. He was having some swelling which has improved by today. Steroid injections with Orthopedics have not worked well for him in the past. The patient is unable to tolerate standing for long. His knees feel stiff. The patient is currently on duloxetine and diclofenac for pain management, and is also on gabapentin for neuritis of the left lower extremity. He has numbness from his hips extending down his legs bilaterally.     REVIEW OF SYSTEMS:   Musculoskeletal: Bilateral knee pain.   Neuro: Numbness in hips extending bilaterally down the legs.  All other 10 point review of systems are negative.    PFSH:  He is accompanied by his daughter today. Reviewed as below.     TOBACCO USE:  Social History     Tobacco Use   Smoking Status Never Smoker   Smokeless Tobacco Former User     Types: Chew   Tobacco Comment    chews betel nut       VITALS:  Vitals:    09/13/19 0840   BP: 134/78   Pulse: 68   Resp: 18   Temp: 97.8  F (36.6  C)   TempSrc: Oral   SpO2: 96%   Weight: 140 lb 7 oz (63.7 kg)   Height: 4' 11.72\" (1.517 m)     Wt Readings from Last 3 Encounters:   09/13/19 140 lb 7 oz (63.7 kg)   07/25/19 139 lb 2 oz (63.1 kg)   07/17/19 141 lb (64 kg)     Body mass index is 27.69 kg/m .      PHYSICAL EXAM:  General: Alert, cooperative, no distress, appears stated age  Head: Normocephalic, without obvious abnormality, atraumatic  Eyes: PERRL, conjunctiva/cornea clear, EOM's intact  Back: Symmetric, no curvature, " ROM normal, numbness over the right SI joint extending down the medial and lateral aspects of his legs  Lungs: Clear to auscultation bilaterally, respirations unlabored  Heart: Regular rate and rhythm, S1 and S2 normal, no murmur, rub, or gallop  Neurologic:  A & O x 3.  No tremor, no focal findings.  Power of left quadriceps is 5/5 and right quadriceps is 4/5. Power of bilateral hamstrings is 5/5 bilaterally though right side requires more effort.     DATA REVIEWED:  Additional History from Old Records Summarized (2): none  Decision to Obtain Records (1): none  Radiology Tests Summarized or Ordered (1): none  Labs Reviewed or Ordered (1): none  Medicine Test Summarized or Ordered (1): none  Independent Review of EKG or X-RAY(2 each): none    Aviva LOCO, am scribing for and in the presence of, Dr. Dyson.    I, Dr. Dyson, personally performed the services described in this documentation, as scribed by Aviva Claros in my presence, and it is both accurate and complete.      MEDICATIONS:  Current Outpatient Medications   Medication Sig Dispense Refill     cetirizine (ZYRTEC) 10 MG tablet TAKE 1 PILL BY MOUTH EVERYDAY FOR ALLERGY 90 tablet 3     DULoxetine (CYMBALTA) 60 MG capsule Take 1 capsule (60 mg total) by mouth daily. 30 capsule 9     gabapentin (NEURONTIN) 300 MG capsule Use 3 capsules three days daily 270 capsule 3     ketotifen (ZADITOR/ZYRTEC ITCHY EYES) 0.025 % (0.035 %) ophthalmic solution Place 1 drop in each eye daily 5 mL 5     polyethylene glycol (GLYCOLAX) 17 gram/dose powder MIX 17 GRAMS IN 8 OZ WATER AND DRINK BY MOUTH DAILY 527 g 5     tamsulosin (FLOMAX) 0.4 mg cap TAKE 1 CAPSULE (0.4 MG TOTAL) BY MOUTH DAILY AFTER LUNCH. 30 capsule 11     acetaminophen (TYLENOL) 325 MG tablet Take 2 tablets (650 mg total) by mouth every 6 (six) hours as needed. 100 tablet 12     bromfenac (BROMDAY) 0.09 % ophthalmic solution Apply 2 drops each daily 5 mL 5     traMADol (ULTRAM) 50 mg tablet Take 1 tablet (50 mg  total) by mouth 2 (two) times a day. 60 tablet 0     VITAMIN D2 50,000 unit capsule TAKE 1 CAPSULE (50,000 UNITS TOTAL) BY MOUTH ONCE A WEEK. 4 capsule 3     No current facility-administered medications for this visit.        Total Data Points: 0    Please note that this clinical encounter uses voice recognition software, there may be typographical errors present

## 2021-06-01 NOTE — PROGRESS NOTES
Chief Complaint   Patient presents with     Dizziness     headache, nausea         HPI:   Mariusz Hebert is a 69 y.o. male with  and daughter c/o dizziness with nausea, without vomiting. since 9/13/2019.  Moving makes the dizziness worse.  Seen in ER on 9/17/19.  Not improved since then. Dizziness was mild at that time.  Head cT showed paranasal sinusitis so started on augmentin  Dizziness started the day he started taking tramadol.  Hasn't taken any since 9/14/2019.    No fever.    Denies stuffy nose.  No ear pain. But has soreness on the side of his head.  C/o of neck tightness starting around the same time.  Denies eye symptoms.  No ear pain or change in hearing.    No chest pain.  No cough or shortness of breath     No urinary symptoms.  No rash    ROS:  A 10 point comprehensive review of systems was negative except as noted.     Medications:  Current Outpatient Medications on File Prior to Visit   Medication Sig Dispense Refill     amoxicillin (AMOXIL) 500 MG capsule Take 1 capsule (500 mg total) by mouth 3 (three) times a day for 7 days. 21 capsule 0     cetirizine (ZYRTEC) 10 MG tablet TAKE 1 PILL BY MOUTH EVERYDAY FOR ALLERGY 90 tablet 3     DULoxetine (CYMBALTA) 60 MG capsule Take 1 capsule (60 mg total) by mouth daily. 30 capsule 9     gabapentin (NEURONTIN) 300 MG capsule Use 3 capsules three days daily 270 capsule 3     ketotifen (ZADITOR/ZYRTEC ITCHY EYES) 0.025 % (0.035 %) ophthalmic solution Place 1 drop in each eye daily 5 mL 5     ondansetron (ZOFRAN ODT) 4 MG disintegrating tablet Take 1 tablet (4 mg total) by mouth every 8 (eight) hours as needed for nausea. 12 tablet 0     polyethylene glycol (GLYCOLAX) 17 gram/dose powder MIX 17 GRAMS IN 8 OZ WATER AND DRINK BY MOUTH DAILY 527 g 5     tamsulosin (FLOMAX) 0.4 mg cap TAKE 1 CAPSULE (0.4 MG TOTAL) BY MOUTH DAILY AFTER LUNCH. 30 capsule 11     acetaminophen (TYLENOL) 325 MG tablet Take 2 tablets (650 mg total) by mouth every 6 (six) hours as  "needed. 100 tablet 12     bromfenac (BROMDAY) 0.09 % ophthalmic solution Apply 2 drops each daily 5 mL 5     traMADol (ULTRAM) 50 mg tablet Take 1 tablet (50 mg total) by mouth 2 (two) times a day. 60 tablet 0     VITAMIN D2 50,000 unit capsule TAKE 1 CAPSULE (50,000 UNITS TOTAL) BY MOUTH ONCE A WEEK. 4 capsule 3     No current facility-administered medications on file prior to visit.          Social History:  Social History     Tobacco Use     Smoking status: Never Smoker     Smokeless tobacco: Former User     Types: Chew     Tobacco comment: chews betel nut   Substance Use Topics     Alcohol use: No     Frequency: Never         Physical Exam:   Vitals:    09/23/19 1420   BP: 112/76   Pulse: 75   Resp: 16   Temp: 98.6  F (37  C)   TempSrc: Oral   SpO2: 93%   Weight: 139 lb (63 kg)   Height: 4' 11.75\" (1.518 m)       GENERAL:   Alert. Oriented.  EYES: Clear  HENT:  Ears: R TM pearly gray. Normal landmarks. L TM pearly gray.  Normal landmarks  Nose: Clear.  Sinuses: Nontender.  Oropharynx:  No erythema. No exudate.  Scalp:  No tenderness over right or left temporal artery.  Both have good pulses  NECK: Supple. No adenopathy.  LUNGS: Clear to ascultation.  No crackles.  No wheezing  HEART: RRR  SKIN:  No rash.   ABDOMEN:  +BS. No tenderness. Soft, no guarding, rebound, rigidity,mass, or organomegaly. No CVA tenderness    NEURO:  CN intact.  No nystagmus. DTR's 1/4 symmetrical. Normal sensation.  Normal strength.  Normal finger to nose.  No tremor          Assessment/Plan:    1. Dizziness  meclizine (ANTIVERT) 12.5 mg tablet    ondansetron (ZOFRAN ODT) 4 MG disintegrating tablet   2. Acute nonintractable headache, unspecified headache type  Erythrocyte Sedimentation Rate   3. Acute non-recurrent maxillary sinusitis     4. Nausea and vomiting, intractability of vomiting not specified, unspecified vomiting type  ondansetron (ZOFRAN ODT) 4 MG disintegrating tablet      Dizziness made worse with head positional " changes.  No findings on neuro exam.  Recent negative head CT except for sinusitis.  No signs of dehydration.  Given meclizine and refilled zofran.  Indicates pain in right temporal area.  Right temporal artery has normal pulse and is nontender, and visual symptoms.  However, still consider temporal arteritis with right sided headache and upper back, neck and shoulder muscle discomfort.  Will check ESR  Advised ice and heat to neck and shoulder.  Discussed stretching exercises.  Complete antibiotics as given.  REcheck promptly if worsening or not improving.            Reg Blankenship MD      9/23/2019    The following portions of the patient's history were reviewed and updated as appropriate: allergies, current medications, past family history, past medical history, past social history, past surgical history and problem list.

## 2021-06-01 NOTE — TELEPHONE ENCOUNTER
Interpretor assisted call.    Seen doctor Friday and was started on Tramadol.    Having side effects from Ultram since starting it.  Feel dizzy, has to close eyes, heart palpitations, did not sleep well.  Also vomited 2 x.    Still has these symptoms.  No chest pain.    No shortness of breath.    Does not work for him.  Afraid to continue taking it.    Patient asking for different medication be sent to pharmacy.    Provider please advise    Pharmacy confirmed.    Fawn Sanders RN, [facility] Nurse Triage/Med Refills RN     Reason for Disposition    Caller has URGENT medication question about med that PCP prescribed and triager unable to answer question    Protocols used: MEDICATION QUESTION CALL-A-

## 2021-06-01 NOTE — PATIENT INSTRUCTIONS - HE
1. Follow up regarding chronic pain in the legs on 10/11/19.   2. May use Percocet as needed for breakthrough pain. Use sparingly.

## 2021-06-01 NOTE — PATIENT INSTRUCTIONS - HE
Stop diclofenac (Voltaren). This has been removed from your medication list.    Start traMADol (ULTRAM) 50 mg tablet; Take 1 tablet (50 mg total) by mouth 2 (two) times a day after meals. Dispense: 60 tablet; Refill: 0  If this works well for pain control, controlled substance agreement will be signed at your next visit.     Continue rest of medications at current doses. No other medication changes were made.    Return for follow up in 4 weeks or sooner as needed.

## 2021-06-01 NOTE — TELEPHONE ENCOUNTER
Patient should not take any further tramadol.  If his symptoms resolve, then no further evaluation needed.  If his symptoms do not resolve, then he should be seen.  Please then forward this message to Dr Dyson for tomorrow to determine what replacement medication should be used.  Thank you.

## 2021-06-01 NOTE — TELEPHONE ENCOUNTER
No further triage assessment needed. Clinical assistant to follow-up with provider's recommendations.    Thank you.    Fawn Sanders

## 2021-06-01 NOTE — TELEPHONE ENCOUNTER
Called pt with covering provider's message.  Understood.  Relayed pt could take Tylenol or Motrin for headache.  PCP please read below and order something else, if desired, for headache pain.  Pt understands if sx persist needs to see PCP.  Thanks.

## 2021-06-02 ENCOUNTER — RECORDS - HEALTHEAST (OUTPATIENT)
Dept: ADMINISTRATIVE | Facility: CLINIC | Age: 71
End: 2021-06-02

## 2021-06-02 ENCOUNTER — OFFICE VISIT - HEALTHEAST (OUTPATIENT)
Dept: FAMILY MEDICINE | Facility: CLINIC | Age: 71
End: 2021-06-02

## 2021-06-02 ENCOUNTER — COMMUNICATION - HEALTHEAST (OUTPATIENT)
Dept: FAMILY MEDICINE | Facility: CLINIC | Age: 71
End: 2021-06-02

## 2021-06-02 VITALS — HEIGHT: 60 IN | BODY MASS INDEX: 27.78 KG/M2 | WEIGHT: 141.5 LBS

## 2021-06-02 VITALS — BODY MASS INDEX: 27.93 KG/M2 | WEIGHT: 143 LBS

## 2021-06-02 VITALS — WEIGHT: 146.25 LBS | BODY MASS INDEX: 28.71 KG/M2 | HEIGHT: 60 IN

## 2021-06-02 VITALS — HEIGHT: 60 IN | WEIGHT: 143.5 LBS | BODY MASS INDEX: 28.17 KG/M2

## 2021-06-02 VITALS — BODY MASS INDEX: 28.27 KG/M2 | HEIGHT: 60 IN | WEIGHT: 144 LBS

## 2021-06-02 VITALS — BODY MASS INDEX: 27.54 KG/M2 | WEIGHT: 141 LBS

## 2021-06-02 DIAGNOSIS — H10.13 ALLERGIC CONJUNCTIVITIS, BILATERAL: ICD-10-CM

## 2021-06-02 DIAGNOSIS — R39.15 BENIGN PROSTATIC HYPERPLASIA (BPH) WITH URINARY URGENCY: ICD-10-CM

## 2021-06-02 DIAGNOSIS — M17.11 PRIMARY OSTEOARTHRITIS OF RIGHT KNEE: ICD-10-CM

## 2021-06-02 DIAGNOSIS — M48.061 SPINAL STENOSIS, LUMBAR REGION, WITHOUT NEUROGENIC CLAUDICATION: ICD-10-CM

## 2021-06-02 DIAGNOSIS — G57.92 NEURITIS OF LEFT LOWER EXTREMITY: ICD-10-CM

## 2021-06-02 DIAGNOSIS — N40.1 BENIGN PROSTATIC HYPERPLASIA (BPH) WITH URINARY URGENCY: ICD-10-CM

## 2021-06-02 RX ORDER — CELECOXIB 200 MG/1
200 CAPSULE ORAL DAILY
Qty: 30 CAPSULE | Refills: 2 | Status: SHIPPED | OUTPATIENT
Start: 2021-06-02 | End: 2021-07-14

## 2021-06-02 NOTE — PROGRESS NOTES
ASSESSMENT AND PLAN:  1. Primary osteoarthritis of right knee    - DULoxetine (CYMBALTA) 60 MG capsule; Take 1 capsule (60 mg total) by mouth daily.  Dispense: 30 capsule; Refill: 9  - Ambulatory referral to [facility]    2. Lumbar Canal Stenosis  Continue with the Loxitane no side effects noted.  - DULoxetine (CYMBALTA) 60 MG capsule; Take 1 capsule (60 mg total) by mouth daily.  Dispense: 30 capsule; Refill: 9    3. Postnasal drip    - cetirizine (ZYRTEC) 10 MG tablet; TAKE 1 PILL BY MOUTH EVERYDAY FOR ALLERGY  Dispense: 90 tablet; Refill: 3    4. Allergic conjunctivitis, bilateral    - ketotifen (ZADITOR/ZYRTEC ITCHY EYES) 0.025 % (0.035 %) ophthalmic solution; Place 1 drop in each eye daily  Dispense: 5 mL; Refill: 5    5. Chondromalacia of knee, right  Fortunately the Ultram caused him to be nauseated he had an extensive work-up done which I just went through with him today.      6. Pain of right lower extremity    He would like to get a second opinion regarding his right knee and the meniscal tear presenting with some orthopedics or [place] [facility].  He went to [facility] orth[facility] and elected to do steroid shots which did not work and physical therapy  - Ambulatory referral to [facility]            Orders Placed This Encounter   Procedures     Ambulatory referral to [facility]     Referral Priority:   Routine     Referral Type:   Consultation     Referral Reason:   Evaluation and Treatment     Referral Location:   [facility] ([facility])     Requested Specialty:   Orthopedics     Number of Visits Requested:   1     Medications Discontinued During This Encounter   Medication Reason     traMADol (ULTRAM) 50 mg tablet Allergic response     DULoxetine (CYMBALTA) 60 MG capsule Reorder     cetirizine (ZYRTEC) 10 MG tablet Reorder     ketotifen (ZADITOR/ZYRTEC ITCHY EYES) 0.025 % (0.035 %) ophthalmic solution Reorder       No follow-ups on file.    CHIEF COMPLAINT:  Chief Complaint  "  Patient presents with     Knee Pain     both       HISTORY OF PRESENT ILLNESS:  Mariusz is a 69 y.o. male with hyperlipidemia, diverticulosis, low back pain with lumbar canal stenosis, neuralgia, BPH, and bilateral knee osteoarthritis, who presents to the clinic today for follow up on bilateral knee pain. Mariusz is present with a Nettie . I last saw him on 09/13/2019 at which time tramadol was started for his knee pain. Shortly after starting that medication, he was evaluated in [facility] ED on 09/17/2019 for emesis, dizziness, and headache. Head CT was negative. His tramadol was stopped, and there has been no recurrence of vomiting since.    The patient then saw Dr. Blankenship in clinic on 09/23/2019 for dizziness and headache at which time meclizine was started for dizziness and antibiotic course was started for acute maxillary sinusitis.     He was seen at the [facility] on 09/24/2019 for bilateral knee/leg pain and low back pain. Sed rate was slightly elevated at 28. A small amount of Percocet was prescribed for breakthrough pain. This did help temporarily with his knee pain.     Today, Mariusz continues to endorse bilateral knee pain, left greater than right. He previously saw [facility], and last received knee injections in March 2017.     REVIEW OF SYSTEMS:   GI: No recurrence of emesis.  Musculoskeletal: Bilateral knee pain.   Neuro: Dizziness and headache resolved.  All other 10 point review of systems are negative.    PFSH:  He is accompanied by his daughter today. Reviewed as below.     TOBACCO USE:  Social History     Tobacco Use   Smoking Status Never Smoker   Smokeless Tobacco Former User     Types: Chew   Tobacco Comment    chews betel nut       VITALS:  Vitals:    10/17/19 1511   BP: 112/76   Pulse: 84   Resp: 18   Temp: 98.1  F (36.7  C)   TempSrc: Oral   SpO2: 97%   Weight: 142 lb 5 oz (64.6 kg)   Height: 4' 11.5\" (1.511 m)     Wt Readings from Last 3 Encounters: "   10/17/19 142 lb 5 oz (64.6 kg)   09/24/19 140 lb 3.2 oz (63.6 kg)   09/23/19 139 lb (63 kg)     Body mass index is 28.26 kg/m .    PHYSICAL EXAM:  General: Alert, cooperative, no distress, appears stated age  Head: Normocephalic, without obvious abnormality, atraumatic  Eyes: PERRL, conjunctiva/cornea clear, EOM's intact  Lungs: Clear to auscultation bilaterally, respirations unlabored  Heart: Regular rate and rhythm, S1 and S2 normal, no murmur, rub, or gallop  Abdomen: Soft, non tender, bowel sounds active all four quadrants, no masses, no organomegaly.  Musculoskeletal: tender to palpation over the medial aspects of bilateral knees, drawer tests were negative  Neurologic:  A & O x 3.  No tremor, no focal findings.  Power 5/5 in bilateral quadriceps and bilateral hamstrings. Normal gait.     DATA REVIEWED:  Additional History from Old Records Summarized (2): Reviewed 09/17/2019 ED note regarding emesis. Reviewed Dr. Blankenship's 09/23/2019 note regarding dizziness and headache. Reviewed [facility] 09/24/2019 note regarding bilateral knee/leg and back pain.  Decision to Obtain Records (1): none  Radiology Tests Summarized or Ordered (1): 09/17/2019 head CT was negative.  Labs Reviewed or Ordered (1): 09/24/2019 sed rate was 28.   Medicine Test Summarized or Ordered (1): none  Independent Review of EKG or X-RAY(2 each): none    IAviva, am scribing for and in the presence of, Dr. Dyson.    IDr. Dyson, personally performed the services described in this documentation, as scribed by Aviva Claros in my presence, and it is both accurate and complete.      MEDICATIONS:  Current Outpatient Medications   Medication Sig Dispense Refill     cetirizine (ZYRTEC) 10 MG tablet TAKE 1 PILL BY MOUTH EVERYDAY FOR ALLERGY 90 tablet 3     DULoxetine (CYMBALTA) 60 MG capsule Take 1 capsule (60 mg total) by mouth daily. 30 capsule 9     gabapentin (NEURONTIN) 300 MG capsule Use 3 capsules three days daily 270  capsule 3     ketotifen (ZADITOR/ZYRTEC ITCHY EYES) 0.025 % (0.035 %) ophthalmic solution Place 1 drop in each eye daily 5 mL 5     polyethylene glycol (GLYCOLAX) 17 gram/dose powder MIX 17 GRAMS IN 8 OZ WATER AND DRINK BY MOUTH DAILY 527 g 5     tamsulosin (FLOMAX) 0.4 mg cap TAKE 1 CAPSULE (0.4 MG TOTAL) BY MOUTH DAILY AFTER LUNCH. 30 capsule 11     acetaminophen (TYLENOL) 325 MG tablet Take 2 tablets (650 mg total) by mouth every 6 (six) hours as needed. 100 tablet 12     bromfenac (BROMDAY) 0.09 % ophthalmic solution Apply 2 drops each daily 5 mL 5     meclizine (ANTIVERT) 12.5 mg tablet Take 1 tablet (12.5 mg total) by mouth 3 (three) times a day as needed. 30 tablet 0     ondansetron (ZOFRAN ODT) 4 MG disintegrating tablet Take 1 tablet (4 mg total) by mouth every 8 (eight) hours as needed for nausea. 12 tablet 0     oxyCODONE-acetaminophen (PERCOCET/ENDOCET) 5-325 mg per tablet Take 1 tablet by mouth every 6 (six) hours as needed for pain. 12 tablet 0     VITAMIN D2 50,000 unit capsule TAKE 1 CAPSULE (50,000 UNITS TOTAL) BY MOUTH ONCE A WEEK. 4 capsule 3     No current facility-administered medications for this visit.        Total Data Points: 4    Please note that this clinical encounter uses voice recognition software, there may be typographical errors present

## 2021-06-02 NOTE — PATIENT INSTRUCTIONS - HE
Bilateral knee pain with known osteoarthritis:     Referral given to [facility] for consultation.    Continue current pain medications.    Consider trying CBD oil. However, this is not typically covered by insurance.

## 2021-06-03 ENCOUNTER — RECORDS - HEALTHEAST (OUTPATIENT)
Dept: ADMINISTRATIVE | Facility: CLINIC | Age: 71
End: 2021-06-03

## 2021-06-03 VITALS
RESPIRATION RATE: 18 BRPM | HEIGHT: 60 IN | TEMPERATURE: 98.1 F | WEIGHT: 142.31 LBS | DIASTOLIC BLOOD PRESSURE: 76 MMHG | OXYGEN SATURATION: 97 % | SYSTOLIC BLOOD PRESSURE: 112 MMHG | HEART RATE: 84 BPM | BODY MASS INDEX: 27.94 KG/M2

## 2021-06-03 VITALS
SYSTOLIC BLOOD PRESSURE: 111 MMHG | OXYGEN SATURATION: 95 % | RESPIRATION RATE: 20 BRPM | HEART RATE: 91 BPM | TEMPERATURE: 98.5 F | BODY MASS INDEX: 27.61 KG/M2 | DIASTOLIC BLOOD PRESSURE: 71 MMHG | WEIGHT: 140.2 LBS

## 2021-06-03 VITALS — WEIGHT: 141 LBS | HEIGHT: 60 IN | BODY MASS INDEX: 27.68 KG/M2

## 2021-06-03 VITALS
HEART RATE: 68 BPM | TEMPERATURE: 97.8 F | RESPIRATION RATE: 18 BRPM | SYSTOLIC BLOOD PRESSURE: 134 MMHG | BODY MASS INDEX: 27.57 KG/M2 | HEIGHT: 60 IN | WEIGHT: 140.44 LBS | DIASTOLIC BLOOD PRESSURE: 78 MMHG | OXYGEN SATURATION: 96 %

## 2021-06-03 VITALS
SYSTOLIC BLOOD PRESSURE: 112 MMHG | HEART RATE: 75 BPM | DIASTOLIC BLOOD PRESSURE: 76 MMHG | TEMPERATURE: 98.6 F | HEIGHT: 60 IN | BODY MASS INDEX: 27.29 KG/M2 | WEIGHT: 139 LBS | RESPIRATION RATE: 16 BRPM | OXYGEN SATURATION: 93 %

## 2021-06-03 VITALS — HEIGHT: 60 IN | WEIGHT: 140.31 LBS | BODY MASS INDEX: 27.55 KG/M2

## 2021-06-03 VITALS — BODY MASS INDEX: 27.32 KG/M2 | WEIGHT: 139.13 LBS | HEIGHT: 60 IN

## 2021-06-04 VITALS
HEART RATE: 69 BPM | DIASTOLIC BLOOD PRESSURE: 76 MMHG | WEIGHT: 147 LBS | BODY MASS INDEX: 28.86 KG/M2 | RESPIRATION RATE: 18 BRPM | HEIGHT: 60 IN | TEMPERATURE: 98.4 F | OXYGEN SATURATION: 96 % | SYSTOLIC BLOOD PRESSURE: 138 MMHG

## 2021-06-04 NOTE — PROGRESS NOTES
ASSESSMENT AND PLAN:  1. Pure hypercholesterolemia  Effective medication will recheck cholesterol next visit.- simvastatin (ZOCOR) 20 MG tablet; Take 1 tablet (20 mg total) by mouth every evening.  Dispense: 30 tablet; Refill: 11    2. Primary osteoarthritis of right knee  Refilling duloxetine no side effects noted  - DULoxetine (CYMBALTA) 60 MG capsule; Take 1 capsule (60 mg total) by mouth daily.  Dispense: 30 capsule; Refill: 9  Improvement noted with corticosteroid injection follow-up with orthopedics in less than 2 months  3. Benign prostatic hyperplasia (BPH) with urinary urgency    Symptoms are stable.    4. Neuritis of left lower extremity    - gabapentin (NEURONTIN) 300 MG capsule; Use 3 capsules three days daily  Dispense: 270 capsule; Refill: 3    5. Other constipation  I have refilled his medication he is having regular bowel movements  - polyethylene glycol (GLYCOLAX) 17 gram/dose powder; MIX 17 GRAMS IN 8 OZ WATER AND DRINK BY MOUTH DAILY  Dispense: 527 g; Refill: 5    6. Lumbar Canal Stenosis    - DULoxetine (CYMBALTA) 60 MG capsule; Take 1 capsule (60 mg total) by mouth daily.  Dispense: 30 capsule; Refill: 9            No orders of the defined types were placed in this encounter.    Medications Discontinued During This Encounter   Medication Reason     polyethylene glycol (GLYCOLAX) 17 gram/dose powder Reorder     DULoxetine (CYMBALTA) 60 MG capsule Reorder     gabapentin (NEURONTIN) 300 MG capsule Reorder       No follow-ups on file.    CHIEF COMPLAINT:  Chief Complaint   Patient presents with     Knee Pain     Right knee feels better       HISTORY OF PRESENT ILLNESS:  Mariusz is a 69 y.o. male with hyperlipidemia, diverticulosis, low back pain with lumbar canal stenosis, neuralgia, BPH, and bilateral knee osteoarthritis, who presents to the clinic today for follow up on knee pain, constipation, and low back pain. Mariusz is present with a Wander (f. [employer]) . He has been following with [facility]  "for his bilateral knee pain which has improved on the right after receiving bilateral knee injections on 10/31/2019. It is now rated at a 1/10. The patient will be seeing [facility] in 2 months for follow up.    His constipation has significantly improved with using Glycolax powder regularly, and he is now having bowel movement 4-5 times per week.     Mariusz is otherwise feeling well overall. He is sleeping okay. His appetite is normal. The patient is not having any back pain today.    REVIEW OF SYSTEMS:   GI: Constipation improved.  Musculoskeletal: Right knee pain. No back pain.   All other 10 point review of systems are negative.    PFSH:  He is accompanied by his daughter today. Reviewed as below.     TOBACCO USE:  Social History     Tobacco Use   Smoking Status Never Smoker   Smokeless Tobacco Former User     Types: Chew   Tobacco Comment    chews betel nut       VITALS:  Vitals:    12/19/19 1051   BP: 138/76   Pulse: 69   Resp: 18   Temp: 98.4  F (36.9  C)   TempSrc: Oral   SpO2: 96%   Weight: 147 lb (66.7 kg)   Height: 4' 11.5\" (1.511 m)     Wt Readings from Last 3 Encounters:   12/19/19 147 lb (66.7 kg)   11/03/19 140 lb (63.5 kg)   10/17/19 142 lb 5 oz (64.6 kg)     Body mass index is 29.19 kg/m .      PHYSICAL EXAM:  General: Alert, cooperative, no distress, appears stated age  Head: Normocephalic, without obvious abnormality, atraumatic  Eyes: PERRL, conjunctiva/cornea clear, EOM's intact  Neck: Supple, no cervical lymph node enlargement   Back: Symmetric, no curvature, ROM normal  Lungs: Clear to auscultation bilaterally, respirations unlabored  Heart: Regular rate and rhythm, S1 and S2 normal, no murmur, rub, or gallop  Abdomen: Soft, non tender, bowel sounds active all four quadrants, no masses, no organomegaly.  Musculoskeletal: On evaluation of right knee, negative drawer tests. No pain with right knee extension or flexion.   Neurologic:  A & O x 3.  No tremor, no focal findings.   Psychiatric: " Normal affect, good eye contact, well-groomed  Skin: No rash or suspicious lesions noted on exposed skin, non-diaphoretic    DATA REVIEWED:  Additional History from Old Records Summarized (2): Reviewed [facility]' 10/31/2019 note regarding bilateral knee pain with injection of right knee.  Decision to Obtain Records (1): none  Radiology Tests Summarized or Ordered (1): none  Labs Reviewed or Ordered (1): none  Medicine Test Summarized or Ordered (1): none  Independent Review of EKG or X-RAY(2 each): none    IAviva, am scribing for and in the presence of, Dr. Dyson.    IDr. Dyson, personally performed the services described in this documentation, as scribed by Aviva Claros in my presence, and it is both accurate and complete.  Total management patient is 25 educated 50% of the time spent discussing his chronic medical conditions and his knee pain.    MEDICATIONS:  Current Outpatient Medications   Medication Sig Dispense Refill     cetirizine (ZYRTEC) 10 MG tablet TAKE 1 PILL BY MOUTH EVERYDAY FOR ALLERGY 90 tablet 3     DULoxetine (CYMBALTA) 60 MG capsule Take 1 capsule (60 mg total) by mouth daily. 30 capsule 9     gabapentin (NEURONTIN) 300 MG capsule Use 3 capsules three days daily 270 capsule 3     polyethylene glycol (GLYCOLAX) 17 gram/dose powder MIX 17 GRAMS IN 8 OZ WATER AND DRINK BY MOUTH DAILY 527 g 5     tamsulosin (FLOMAX) 0.4 mg cap TAKE 1 CAPSULE (0.4 MG TOTAL) BY MOUTH DAILY AFTER LUNCH. 30 capsule 11     acetaminophen (TYLENOL) 325 MG tablet Take 2 tablets (650 mg total) by mouth every 6 (six) hours as needed. 100 tablet 12     acetaminophen (TYLENOL) 325 MG tablet Take 2 tablets (650 mg total) by mouth every 6 (six) hours as needed for pain. 30 tablet 0     bromfenac (BROMDAY) 0.09 % ophthalmic solution Apply 2 drops each daily 5 mL 5     ketotifen (ZADITOR/ZYRTEC ITCHY EYES) 0.025 % (0.035 %) ophthalmic solution Place 1 drop in each eye daily 5 mL 5     ondansetron (ZOFRAN ODT) 4 MG  disintegrating tablet Take 1 tablet (4 mg total) by mouth every 8 (eight) hours as needed for nausea. 12 tablet 0     oxyCODONE-acetaminophen (PERCOCET/ENDOCET) 5-325 mg per tablet Take 1 tablet by mouth every 6 (six) hours as needed for pain. 12 tablet 0     simvastatin (ZOCOR) 20 MG tablet Take 1 tablet (20 mg total) by mouth every evening. 30 tablet 11     VITAMIN D2 50,000 unit capsule TAKE 1 CAPSULE (50,000 UNITS TOTAL) BY MOUTH ONCE A WEEK. 4 capsule 3     No current facility-administered medications for this visit.        Total Data Points: 2    Please note that this clinical encounter uses voice recognition software, there may be typographical errors present

## 2021-06-05 VITALS
DIASTOLIC BLOOD PRESSURE: 70 MMHG | OXYGEN SATURATION: 98 % | TEMPERATURE: 98.3 F | WEIGHT: 144.38 LBS | SYSTOLIC BLOOD PRESSURE: 136 MMHG | BODY MASS INDEX: 28.67 KG/M2 | HEART RATE: 92 BPM

## 2021-06-05 VITALS
SYSTOLIC BLOOD PRESSURE: 122 MMHG | TEMPERATURE: 97.9 F | HEART RATE: 68 BPM | RESPIRATION RATE: 16 BRPM | BODY MASS INDEX: 29.11 KG/M2 | HEIGHT: 60 IN | DIASTOLIC BLOOD PRESSURE: 76 MMHG | WEIGHT: 148.25 LBS

## 2021-06-05 NOTE — PROGRESS NOTES
[facility] Care Coordination Contact    Member became effective with  [facility] on 01[facility]01/[facility]  Previous Health Plan: [employer] MSC+  Previous Care System: [facility]  Previous care coordinators name and number: Keren Sheryl Maki  Waiver Type: EW  Last MMIS Entry: Date 12/13/19 and Type 06 REASSMT  Services Listed in MMIS:   19 I PERSONAL 21 F DAY SVC 26 F SUPPLIES  36 F CASE MGMT 48 F WVRAC HAMILTON 67 F PCA SUPER  UTF received: No: Requested on 1/20/2020.     Aviva Barton  Care Management Specialist  [facility]  [phone]

## 2021-06-05 NOTE — PROGRESS NOTES
[facility] Care Coordination Contact    [facility] Health Plan or Product Change  Late Entry of HRA 01/23/20  CC received notification that member's health plan or health plan product changed from [employer] MSC+ to [employer] MSC+ effective 01/01/20.  CC contacted member and discussed change and face-to-face visit was offered. Member declined need for home visit. CC reviewed previous Health Risk Assessment/LTCC and POC with member and no changes noted.    Called member and introduced self as member s new CC. Confirmed with member that the welcome letter with writer's name and contact information has been received.  Reviewed LTCC/Health Risk Assessment (HRA) and POC with member. No changes noted.  Transitional HRA completed. Care Plan Summary updated and reflects current services.  Required referral authorization information communicated to CMS: Yes  MMIS entry completed by: Care Coordinator  Writer reviewed the following with member:  ER visits: Yes -  [facility]  Hospitalizations: No  TCU stays: No  Significant health status changes: None  Falls/Injuries: No  ADL/IADL changes: No  Changes in services: No    Follow-Up Plan: Member informed of future contact, plan to f/u with member with at next regularly scheduled contact.  Contact information shared with member and family, encouraged member to call with any questions or concerns.    Care coordinator spoke with CHAZ for Mariusz.  She reported no changes or no new needs at this time.      JANNETH Gonzalez  [facility]  Phone: [phone]

## 2021-06-05 NOTE — PROGRESS NOTES
[facility] Care Coordination Contact    Completed following tasks: Updated services in access and Submitted referrals/auths for ADC w/ [facility] (2 days plus transportation).    Aviva Barton  Care Management Specialist  [facility]  [phone]

## 2021-06-05 NOTE — PROGRESS NOTES
[facility] Care Coordination Contact    Care coordinator received call from [facility] that Mariusz has not received his pull ups and they do not have a SA on file for Adult [facility].  Care coordinator advised that she would call [facility] to find out status of supplies.   SA would be issues by Tito mid Feb.      Care coordinator called [facility] and they said that the supplies were on hold due to change of ins.  They will fix the system and have supplies mailed out next week for Feb.      Care coordinator called [facility] back and advised.      JANNETH Gonzalez  [facility]  Phone: [phone]

## 2021-06-06 NOTE — PROGRESS NOTES
[facility] Care Coordination Contact    Care Coordinator faxed PCA Assessment from 12/13/19 completed by [facility] Care Coordinator when member was with [facility] today.  Member transferred to [facility] 01/01/20.      Care Coordinator previously called [facility] and they confirmed that they did not have the PCA on file.  The last SA they have for the PCA ends 03/31/20.      JANNETH Gonzalez  [facility]  Phone: [phone]

## 2021-06-06 NOTE — PROGRESS NOTES
[facility] Care Coordination Contact    Care Coordinator confirmed that leydi got the PCA Assessment done Nov 2019.     [facility] updated the PCA Assessment span.       Care Coordinator called family with  and informed them that a PCA reassessment will be completed in Nov 2020.      JANNETH Gonzalez  [facility]  Phone: [phone]

## 2021-06-07 NOTE — PROGRESS NOTES
"Mariusz Hebert is a 70 y.o. male who is being evaluated via a billable telephone visit.      The patient has been notified of following:     \"This telephone visit will be conducted via a call between you and your physician/provider. We have found that certain health care needs can be provided without the need for a physical exam.  This service lets us provide the care you need with a short phone conversation.  If a prescription is necessary we can send it directly to your pharmacy.  If lab work is needed we can place an order for that and you can then stop by our lab to have the test done at a later time.    Telephone visits are billed at different rates depending on your insurance coverage. During this emergency period, for some insurers they may be billed the same as an in-person visit.  Please reach out to your insurance provider with any questions.    If during the course of the call the physician/provider feels a telephone visit is not appropriate, you will not be charged for this service.\"    Patient has given verbal consent to a Telephone visit? Yes    Patient would like to receive their AVS by AVS Preference: Mail a copy.    Additional provider notes:     Assessment/Plan:  1. Pure hypercholesterolemia  Taking statin. Will check cholesterol  In 4-6 months    2. Primary osteoarthritis of right knee  right knee pain  Has improved  Continue mobic      3. Benign prostatic hyperplasia (BPH) with urinary urgency  No symptoms noted continue flomax    4. Other constipation    - polyethylene glycol (GLYCOLAX) 17 gram/dose powder; MIX 17 GRAMS IN 8 OZ WATER AND DRINK BY MOUTH DAILY  Dispense: 527 g; Refill: 5    5. Neuritis of left lower extremity  Continue current dose  - gabapentin (NEURONTIN) 300 MG capsule; Use 3 capsules three days daily  Dispense: 270 capsule; Refill: 3        Phone call duration:  22 minutes    Shree Dyson MD  "

## 2021-06-08 NOTE — PROGRESS NOTES
ASSESSMENT AND PLAN:  1. Lumbar Canal Stenosis is constant radiating pain located in the right lumbar portion spine from L5 pedicle rating down to his right hip he has difficulty with housework is difficulty doing his daily tasks of living.  He cannot cook or clean.  His difficulty ambulating from lead of PCE during the day to be semi-independent(      2. Neuritis of left lower extremity  burning pain in the lower left extremity is better gabapentin     3. Urinary incontinence  continues to have urinary dribbling.  He is on the Detrol her medications of Cardizem to be dizzy     4. Benign prostatic hyperplasia (BPH) with urinary urgency  he has a large prostate try adding finasteride to his regimen if this does not prove useful syndrome to urology     5. Pure hypercholesterolemia  discussed recent cholesterol results which showed 3 months his total cholesterol is elevated dietary changes recommended         CHIEF COMPLAINT:  Chief Complaint   Patient presents with     Follow-up       HISTORY OF PRESENT ILLNESS:  Mariusz is a 66 y.o. male presenting for a follow-up of benign prostatic hyperplasia and urinary incontinence . Mariusz is present with a Nettie .He is taking the Flomax and oxybutynin faithfully but oxybutynin makes him feel dizzy. He notes that he does not force himself to urinate but he has accidents when he is sleeping. He wears a diaper when he goes to sleep. He has a cramping sensation over his bladder before he urinates.     Lumbar Stenosis: He states that his back pain has remained unchanged from last visit.     REVIEW OF SYSTEMS:   He denies emesis.   All other 10 point review of systems are negative.    PFSH:  Reviewed as below.     TOBACCO USE:  History   Smoking Status     Never Smoker   Smokeless Tobacco     Current User     Types: Chew     Comment: chews betel nut       VITALS:  Vitals:    01/31/17 1259   BP: 112/70   Patient Site: Right Arm   Patient Position: Sitting   Cuff Size: Adult  Regular   Pulse: 80   Resp: 16   Temp: 98  F (36.7  C)   TempSrc: Oral   Weight: 144 lb 8 oz (65.5 kg)     Wt Readings from Last 3 Encounters:   01/31/17 144 lb 8 oz (65.5 kg)   11/29/16 145 lb 8 oz (66 kg)   09/20/16 145 lb 6 oz (65.9 kg)     Body mass index is 28.22 kg/(m^2).    PHYSICAL EXAM:  General: Alert, cooperative, no distress, appears stated age  Back: Tenderness over left SI joint and mild tenderness over L5.   Abdomen: Soft, non tender, bowel sounds active all four quadrants, no masses, no organomegaly.  Neurologic:  A & O x 3.  No tremor, no focal findings.    pschh looks comfortable oriented ×3 not agitated    DATA REVIEWED:  Additional History from Old Records Summarized (2): None  Decision to Obtain Records (1): None  Radiology Tests Summarized or Ordered (1): None  Labs Reviewed or Ordered (1): Reviewed lipid profile from 11/29/2016.   Medicine Test Summarized or Ordered (1): None  Independent Review of EKG or X-RAY(2 each): None    The visit lasted a total of 10 minutes face to face with the patient. Over 50% of the time was spent counseling and educating the patient about urinary incontinence .     Nano LOCO, am scribing for and in the presence of, Dr. Dyson.    IDr. Dyson, personally performed the services described in this documentation, as scribed by Nano Garcia in my presence, and it is both accurate and complete.      MEDICATIONS:  Current Outpatient Prescriptions   Medication Sig Dispense Refill     cetirizine (ZYRTEC) 10 MG tablet TAKE 1 PILL BY MOUTH EVERY DAY FOR ALLERGIES 30 tablet 2     diclofenac (VOLTAREN) 50 MG EC tablet Take 1 tablet (50 mg total) by mouth 2 (two) times a day. 60 tablet 11     DULoxetine (CYMBALTA) 60 MG capsule Take 1 capsule (60 mg total) by mouth daily. 30 capsule 11     ergocalciferol (ERGOCALCIFEROL) 50,000 unit capsule TAKE 1 CAPSULE (50,000 UNITS TOTAL) BY MOUTH ONCE A WEEK. 4 capsule 6     gabapentin (NEURONTIN) 300 MG capsule TAKE 2  CAPSULES (600 MG TOTAL) BY MOUTH 3 (THREE) TIMES A DAY. 180 capsule 3     polyethylene glycol 3350 8.5 gram PwPk Take 1 packet by mouth daily. 30 packet 11     tamsulosin (FLOMAX) 0.4 mg Cp24 Take 1 capsule (0.4 mg total) by mouth Daily after lunch. 30 capsule 1     acetaminophen (TYLENOL) 325 MG tablet Take 2 tablets (650 mg total) by mouth every 6 (six) hours as needed. 100 tablet 12     bromfenac (BROMDAY) 0.09 % ophthalmic solution Apply 2 drops each daily 5 mL 5     ketorolac (ACULAR LS) 0.4 % Drop APPLY 2 DROPS IN EACH EYE DAILY AS NEEDED 5 mL 1     naproxen (NAPROSYN) 500 MG tablet TAKE 1 PILL BY MOUTH TWO TIMES A DAY WITH MEALS 60 tablet 2     oxybutynin (DITROPAN XL) 5 MG ER tablet TAKE 1 TABLET (5 MG TOTAL) BY MOUTH DAILY. 30 tablet 5     oxyCODONE-acetaminophen (PERCOCET) 5-325 mg per tablet Take 1 tablet by mouth every 6 (six) hours as needed for pain.       sodium chloride (OCEAN) 0.65 % nasal spray 1 spray into each nostril as needed for congestion.       traZODone (DESYREL) 50 MG tablet TAKE 1 PILL (50 MG TOTAL) BY MOUTH BEDTIME FOR SLEEP 30 tablet 11     No current facility-administered medications for this visit.        Total Data Points: 1

## 2021-06-09 NOTE — PROGRESS NOTES
ASSESSMENT AND PLAN:  1. Benign prostatic hyperplasia (BPH) with urinary urgency he has only minor improvement with both the Avodart and the Flomax.  He still using a diaper.  He does not want to see urology will have him come back in 6 weeks to revisit his symptoms at that time.  Slight dizziness noted with both medications.      2. Lumbar Canal Stenosis his back pain is essentially the same as long as he uses the gabapentin, Voltaren and duloxetine he has minimal back discomfort which she rates at a 4-5 out of 10.      3. Left knee pain knee pain is located on the medial and lateral aspects of his left knee is most prominent on the medial collateral ligament denies any injuries he had minimal improvement with corticosteroid injection in his right knee and I believe these been using his left knee more to ambulate he wants to see orthopedics again.   Ambulatory referral to Orthopedics       CHIEF COMPLAINT:  Chief Complaint   Patient presents with     Follow-up     bilateral leg, pain.       HISTORY OF PRESENT ILLNESS:  Mariusz is a 67 y.o. male presenting for a follow-up of lumbar spine pain. Mariusz is present with a Nettie .He is taking Voltaren, gabapentin and Cymbalta for his bilateral leg and back pain. He states that his back pain is still present but will improve when he takes the medications.     BPH: He has been taking Flomax and finasteride faithfully. He notes that his urinary symptoms have not improved.  He notes that he wears a diaper at night due to urinary frequency. His flow of urine is weak. He experiences intermittent dizziness when taking the medications. .    Left Knee Pain: He states that his left knee pain has been increasing for the past 20 days. The pain over his knee will shift. He notes that the pain will worsen when he walking. He denies slipping or injuring his knee.     REVIEW OF SYSTEMS:   He is wearing a knee brace for his right knee.    All other 10 point review of systems are  negative.    PFSH:  Reviewed as below.     TOBACCO USE:  History   Smoking Status     Never Smoker   Smokeless Tobacco     Current User     Types: Chew     Comment: chews betel nut       VITALS:  Vitals:    03/03/17 1143   BP: 134/86   Patient Site: Left Arm   Patient Position: Sitting   Cuff Size: Adult Regular   Pulse: 92   Resp: 16   Temp: 97.9  F (36.6  C)   TempSrc: Oral   Weight: 143 lb 1 oz (64.9 kg)     Wt Readings from Last 3 Encounters:   03/03/17 143 lb 1 oz (64.9 kg)   01/31/17 144 lb 8 oz (65.5 kg)   11/29/16 145 lb 8 oz (66 kg)     Body mass index is 27.94 kg/(m^2).    PHYSICAL EXAM:  General: Alert, cooperative, no distress, appears stated age  Lungs: Clear to auscultation bilaterally, respirations unlabored  Chest wall: No tenderness or deformity  Heart: Regular rate and rhythm, S1 and S2 normal, no murmur, rub, or gallop  Musculoskeletal: Tenderness over the Left popliteal fossa.  Tenderness over medial aspect of left knee 1 cm inferior to the border of the patella  Neurologic:  A & O x 3.  No tremor, no focal findings.       DATA REVIEWED:  Additional History from Old Records Summarized (2): None  Decision to Obtain Records (1): None  Radiology Tests Summarized or Ordered (1): None  Labs Reviewed or Ordered (1): None  Medicine Test Summarized or Ordered (1): None  Independent Review of EKG or X-RAY(2 each): None    The visit lasted a total of 12 minutes face to face with the patient. Over 50% of the time was spent counseling and educating the patient about BPH.     Nano LOCO, am scribing for and in the presence of, Dr. Dyson.    Dr. Kiel LOCO, personally performed the services described in this documentation, as scribed by Nano Garcia in my presence, and it is both accurate and complete.      MEDICATIONS:  Current Outpatient Prescriptions   Medication Sig Dispense Refill     bromfenac (BROMDAY) 0.09 % ophthalmic solution Apply 2 drops each daily 5 mL 5     cetirizine (ZYRTEC) 10  MG tablet TAKE 1 PILL BY MOUTH EVERY DAY FOR ALLERGIES 30 tablet 2     diclofenac (VOLTAREN) 50 MG EC tablet Take 1 tablet (50 mg total) by mouth 2 (two) times a day. 60 tablet 11     DULoxetine (CYMBALTA) 60 MG capsule Take 1 capsule (60 mg total) by mouth daily. 30 capsule 11     finasteride (PROSCAR) 5 mg tablet Take 1 tablet (5 mg total) by mouth daily. 30 tablet 2     gabapentin (NEURONTIN) 300 MG capsule TAKE 2 CAPSULES (600 MG TOTAL) BY MOUTH 3 (THREE) TIMES A DAY. 180 capsule 3     polyethylene glycol 3350 8.5 gram PwPk Take 1 packet by mouth daily. 30 packet 11     tamsulosin (FLOMAX) 0.4 mg Cp24 TAKE 1 CAPSULE (0.4 MG TOTAL) BY MOUTH DAILY AFTER LUNCH. 30 capsule 1     VITAMIN D2 50,000 unit capsule TAKE 1 CAPSULE (50,000 UNITS TOTAL) BY MOUTH ONCE A WEEK. 4 capsule 6     acetaminophen (TYLENOL) 325 MG tablet Take 2 tablets (650 mg total) by mouth every 6 (six) hours as needed. 100 tablet 12     ketorolac (ACULAR LS) 0.4 % Drop APPLY 2 DROPS IN EACH EYE DAILY AS NEEDED 5 mL 1     naproxen (NAPROSYN) 500 MG tablet TAKE 1 PILL BY MOUTH TWO TIMES A DAY WITH MEALS 60 tablet 2     oxybutynin (DITROPAN XL) 5 MG ER tablet TAKE 1 TABLET (5 MG TOTAL) BY MOUTH DAILY. 30 tablet 5     oxyCODONE-acetaminophen (PERCOCET) 5-325 mg per tablet Take 1 tablet by mouth every 6 (six) hours as needed for pain.       sodium chloride (OCEAN) 0.65 % nasal spray 1 spray into each nostril as needed for congestion.       traZODone (DESYREL) 50 MG tablet TAKE 1 PILL (50 MG TOTAL) BY MOUTH BEDTIME FOR SLEEP 30 tablet 11     No current facility-administered medications for this visit.        Total Data Points: 0

## 2021-06-09 NOTE — TELEPHONE ENCOUNTER
Refill Approved    Rx renewed per Medication Renewal Policy. Medication was last renewed on 6/12/19.    Chantale Ferro, [facility] Triage/Med Refill 6/30/2020     Requested Prescriptions   Pending Prescriptions Disp Refills     tamsulosin (FLOMAX) 0.4 mg cap [Pharmacy Med Name: TAMSULOSIN HCL 0.4 MG CAPS 0.4 CAP] 30 capsule 11     Sig: TAKE 1 CAPSULE (0.4 MG TOTAL) BY MOUTH DAILY AFTER LUNCH.       Alfuzosin/Tamsulosin/Silodosin Refill Protocol  Passed - 6/29/2020  5:02 PM        Passed - PCP or prescribing provider visit in past 12 months       Last office visit with prescriber/PCP: 12/19/2019 Shree Dyson MD OR same dept: 12/19/2019 Shree Dyson MD OR same specialty: 12/19/2019 Shree Dyson MD  Last physical: 3/12/2019 Last MTM visit: Visit date not found   Next visit within 3 mo: Visit date not found  Next physical within 3 mo: Visit date not found  Prescriber OR PCP: Shree Dyson MD  Last diagnosis associated with med order: 1. Benign prostatic hyperplasia (BPH) with urinary urgency  - tamsulosin (FLOMAX) 0.4 mg cap [Pharmacy Med Name: TAMSULOSIN HCL 0.4 MG CAPS 0.4 CAP]; TAKE 1 CAPSULE (0.4 MG TOTAL) BY MOUTH DAILY AFTER LUNCH.  Dispense: 30 capsule; Refill: 11    If protocol passes may refill for 12 months if within 3 months of last provider visit (or a total of 15 months).

## 2021-06-09 NOTE — PROGRESS NOTES
[facility] Care Coordination Contact      [facility] Six-Month Telephone Assessment    6 month telephone assessment completed on 06/24/20.     ER visits: Yes -  [facility]  Hospitalizations: No  TCU stays: No  Significant health status changes: None  Falls/Injuries: No  ADL/IADL changes: No  Changes in services: No    Caregiver Assessment follow up:  N/A    Goals: See POC in chart for goal progress documentation.  Care Coordinator spoke to spouse.  She reported that Mariusz has started to attend his [facility].  He has not been receiving alternative adult day services.  He has no current needs at this time.      Will see member in 6 months for an annual health risk assessment.   Encouraged member to call CC with any questions or concerns in the meantime.     JANNETH Gonzalez  [facility]  Phone: [phone]

## 2021-06-10 NOTE — PROGRESS NOTES
ASSESSMENT AND PLAN:  1. Benign prostatic hyperplasia (BPH) with urinary urgency     Approximately 3 weeks ago I had prescribed finasteride for him he did not receive that from the pharmacy has been only using the generic Flomax his symptoms therefore remain the same I represcribed the finasteride and will see him again in 3 months.          CHIEF COMPLAINT:  Chief Complaint   Patient presents with     Follow-up       HISTORY OF PRESENT ILLNESS:  Mariusz is a 67 y.o. male presenting for a follow-up. Mariusz is present with a Nettie .     BPH: He is taking Flomax faithfully but has not picked up the Proscar. He notes that his urine stream is not constant and has to urinate frequently during the night. His sleep is disturbed due to waking up frequently.     REVIEW OF SYSTEMS:   He denies vomiting.    All other 10 point review of systems are negative.    PFSH:  Reviewed as below.     TOBACCO USE:  History   Smoking Status     Never Smoker   Smokeless Tobacco     Current User     Types: Chew     Comment: chews betel nut       VITALS:  Vitals:    05/02/17 1124   BP: 124/78   Patient Site: Left Arm   Patient Position: Semi-quinn   Cuff Size: Adult Regular   Pulse: 72   Resp: 20   Temp: 97.7  F (36.5  C)   TempSrc: Oral   Weight: 146 lb 1 oz (66.3 kg)     Wt Readings from Last 3 Encounters:   05/02/17 146 lb 1 oz (66.3 kg)   04/14/17 147 lb 14.4 oz (67.1 kg)   03/03/17 143 lb 1 oz (64.9 kg)     Body mass index is 29.01 kg/(m^2).    PHYSICAL EXAM:  General: Alert, cooperative, no distress, appears stated age  Neurologic:  A & O x 3.  No tremor, no focal findings.      DATA REVIEWED:  Additional History from Old Records Summarized (2): None  Decision to Obtain Records (1): None  Radiology Tests Summarized or Ordered (1): None  Labs Reviewed or Ordered (1): none  Medicine Test Summarized or Ordered (1): None  Independent Review of EKG or X-RAY(2 each): None    The visit lasted a total of 9 minutes face to face with the  patient. Over 50% of the time was spent counseling and educating the patient about BPH.     I, Nano Garcia, am scribing for and in the presence of, Dr. Dyson.    I, Dr. Dyson, personally performed the services described in this documentation, as scribed by Nano Garcia in my presence, and it is both accurate and complete.      MEDICATIONS:  Current Outpatient Prescriptions   Medication Sig Dispense Refill     cetirizine (ZYRTEC) 10 MG tablet TAKE 1 PILL BY MOUTH EVERY DAY FOR ALLERGIES 30 tablet 2     diclofenac (VOLTAREN) 50 MG EC tablet Take 1 tablet (50 mg total) by mouth 2 (two) times a day. 60 tablet 11     DULoxetine (CYMBALTA) 60 MG capsule TAKE 1 CAPSULE (60 MG TOTAL) BY MOUTH DAILY 30 capsule 10     gabapentin (NEURONTIN) 300 MG capsule TAKE 2 CAPSULES (600 MG TOTAL) BY MOUTH 3 (THREE) TIMES A DAY. 180 capsule 4     oxybutynin (DITROPAN XL) 5 MG ER tablet TAKE 1 TABLET (5 MG TOTAL) BY MOUTH DAILY. 30 tablet 5     polyethylene glycol 3350 8.5 gram PwPk Take 1 packet by mouth daily. 30 packet 11     tamsulosin (FLOMAX) 0.4 mg Cp24 TAKE 1 CAPSULE (0.4 MG TOTAL) BY MOUTH DAILY AFTER LUNCH. 30 capsule 1     VITAMIN D2 50,000 unit capsule TAKE 1 CAPSULE (50,000 UNITS TOTAL) BY MOUTH ONCE A WEEK. 4 capsule 6     acetaminophen (TYLENOL) 325 MG tablet Take 2 tablets (650 mg total) by mouth every 6 (six) hours as needed. 100 tablet 12     bromfenac (BROMDAY) 0.09 % ophthalmic solution Apply 2 drops each daily 5 mL 5     finasteride (PROSCAR) 5 mg tablet Take 1 tablet (5 mg total) by mouth daily. 30 tablet 2     ketorolac (ACULAR LS) 0.4 % Drop APPLY 2 DROPS IN EACH EYE DAILY AS NEEDED 5 mL 1     naproxen (NAPROSYN) 500 MG tablet TAKE 1 PILL BY MOUTH TWO TIMES A DAY WITH MEALS 60 tablet 2     oxyCODONE-acetaminophen (PERCOCET) 5-325 mg per tablet Take 1 tablet by mouth every 6 (six) hours as needed for pain.       sodium chloride (OCEAN) 0.65 % nasal spray 1 spray into each nostril as needed for  congestion.       traZODone (DESYREL) 50 MG tablet TAKE 1 PILL (50 MG TOTAL) BY MOUTH BEDTIME FOR SLEEP 30 tablet 11     No current facility-administered medications for this visit.        Total Data Points: 0

## 2021-06-11 NOTE — TELEPHONE ENCOUNTER
Refill Approved    Rx renewed per Medication Renewal Policy. Medication was last renewed on 4/16/20, last OV 4/16/20.    Angela Camacho, [facility] Triage/Med Refill 9/28/2020     Requested Prescriptions   Pending Prescriptions Disp Refills     gabapentin (NEURONTIN) 300 MG capsule [Pharmacy Med Name: GABAPENTIN 300 MG CAPSULE 300 Capsule] 270 capsule 3     Sig: TAKE 3 PILLS BY MOUTH 3 TIMES DAILY/ TXHUA HNUB NOJ 3 LUB 3 ZAUG       Gabapentin/Levetiracetam/Tiagabine Refill Protocol  Passed - 9/24/2020  9:54 AM        Passed - PCP or prescribing provider visit in past 12 months or next 3 months     Last office visit with prescriber/PCP: 12/19/2019 Shree Dyson MD OR same dept: 12/19/2019 Shree Dyson MD OR same specialty: 12/19/2019 Shree Dyson MD  Last physical: 3/12/2019 Last MTM visit: Visit date not found   Next visit within 3 mo: Visit date not found  Next physical within 3 mo: Visit date not found  Prescriber OR PCP: Shree Dyson MD  Last diagnosis associated with med order: 1. Neuritis of left lower extremity  - gabapentin (NEURONTIN) 300 MG capsule [Pharmacy Med Name: GABAPENTIN 300 MG CAPSULE 300 Capsule]; TAKE 3 PILLS BY MOUTH 3 TIMES DAILY/ TXHUA HNUB NOJ 3 LUB 3 ZAUG  Dispense: 270 capsule; Refill: 3    If protocol passes may refill for 12 months if within 3 months of last provider visit (or a total of 15 months).

## 2021-06-12 NOTE — PROGRESS NOTES
[facility] Care Coordination Contact    Called member to schedule annual HRA home visit. Care Coordinator left message with  asking for a call back.     JANNETH Gonzalez  [facility]  Phone: [phone]

## 2021-06-12 NOTE — PROGRESS NOTES
ASSESSMENT AND PLAN:  1. Neuritis of left lower extremity  DULoxetine (CYMBALTA) 60 MG capsule   Continue gabapentin And duloxetine at current doses no side effects noted pain is minimal. gabapentin (NEURONTIN) 300 MG capsule    Basic Metabolic Panel   2. Urinary Incontinence on oxybutynin no side effects noted minor dribbling noted at night no exudates noted in the daytime    3. Benign prostatic hyperplasia (BPH) with urinary urgency I have removed the finasteride from his regimen was causing him to become dizzy at this point time he does not want to see a urologist    4. Pure hypercholesterolemia     5. Lumbago back pain has been controlled with the addition of diclofenac no side effects noted check kidney function today Basic Metabolic Panel    Lipid Profile   6. Lumbar Canal Stenosis has a combination medications including NSAID check a lipid profile today DULoxetine (CYMBALTA) 60 MG capsule       CHIEF COMPLAINT:  Chief Complaint   Patient presents with     Diabetes     Medication Refill     artificial tear drops       HISTORY OF PRESENT ILLNESS:  Mariusz is a 67 y.o. male presenting for a follow-up. Mariusz is present with a Nettie .     Back Pain: He has been taking gabapentin and Cymbalta faithfully.     Constipation: He notes that he becomes constipated if he does not take the polyethylene glycol powder daily. He has increased his vegetable consumption.     BPH: He has to wear diapers at night because he will sometimes have an accident.  He states that the Proscar caused him to feel dizzy.    Knee Pain: He states that his right knee pain has improved with Voltaren. He denies burning epigastric abdominal pain.     REVIEW OF SYSTEMS:   He states that he sleeps well.    All other 10 point review of systems are negative.    PFSH:  Reviewed as below.     TOBACCO USE:  History   Smoking Status     Never Smoker   Smokeless Tobacco     Current User     Types: Chew     Comment: chews betel nut        VITALS:  Vitals:    08/02/17 0815   BP: 128/80   Patient Site: Left Arm   Patient Position: Sitting   Cuff Size: Adult Regular   Pulse: 64   Resp: 20   Temp: 97.8  F (36.6  C)   TempSrc: Oral   Weight: 142 lb 1 oz (64.4 kg)     Wt Readings from Last 3 Encounters:   08/02/17 142 lb 1 oz (64.4 kg)   05/02/17 146 lb 1 oz (66.3 kg)   04/14/17 147 lb 14.4 oz (67.1 kg)     Body mass index is 28.21 kg/(m^2).    PHYSICAL EXAM:  General: Alert, cooperative, no distress, appears stated age  Back: Symmetric, no curvature, ROM normal, no CVA tenderness  Lungs: Clear to auscultation bilaterally, respirations unlabored  Chest wall: No tenderness or deformity  Heart: Regular rate and rhythm, S1 and S2 normal, no murmur, rub, or gallop  Musculoskeletal: Tenderness over right oblique.   Neurologic:  A & O x 3.  No tremor, no focal findings.       DATA REVIEWED:  Additional History from Old Records Summarized (2): None  Decision to Obtain Records (1): None  Radiology Tests Summarized or Ordered (1): None  Labs Reviewed or Ordered (1): Labs ordered.   Medicine Test Summarized or Ordered (1): None  Independent Review of EKG or X-RAY(2 each): none    The visit lasted a total of 25 minutes face to face with the patient. Over 50% of the time was spent counseling and educating the patient about multiple conditions.     Nano LOCO, am scribing for and in the presence of, Dr. Dyson.    Shree LOCO personally performed the services described in this documentation, as scribed by Nano Garcia in my presence, and it is both accurate and complete.      MEDICATIONS:  Current Outpatient Prescriptions   Medication Sig Dispense Refill     cetirizine (ZYRTEC) 10 MG tablet TAKE 1 PILL BY MOUTH EVERY DAY FOR ALLERGIES 90 tablet 2     diclofenac (VOLTAREN) 50 MG EC tablet Take 1 tablet (50 mg total) by mouth 2 (two) times a day. 60 tablet 11     DULoxetine (CYMBALTA) 60 MG capsule TAKE 1 CAPSULE (60 MG TOTAL) BY MOUTH DAILY 30  capsule 10     finasteride (PROSCAR) 5 mg tablet Take 1 tablet (5 mg total) by mouth daily. 30 tablet 2     gabapentin (NEURONTIN) 300 MG capsule TAKE 2 CAPSULES (600 MG TOTAL) BY MOUTH 3 (THREE) TIMES A DAY. 180 capsule 4     oxybutynin (DITROPAN XL) 5 MG ER tablet TAKE 1 TABLET (5 MG TOTAL) BY MOUTH DAILY. 90 tablet 3     polyethylene glycol 3350 8.5 gram PwPk Take 1 packet by mouth daily. 30 packet 11     polyvinyl alcohol (LIQUIFILM TEARS) 1.4 % ophthalmic solution 1 drop as needed for dry eyes.       tamsulosin (FLOMAX) 0.4 mg Cp24 TAKE 1 CAPSULE (0.4 MG TOTAL) BY MOUTH DAILY AFTER LUNCH. 30 capsule 1     VITAMIN D2 50,000 unit capsule TAKE 1 CAPSULE (50,000 UNITS TOTAL) BY MOUTH ONCE A WEEK. 4 capsule 6     acetaminophen (TYLENOL) 325 MG tablet Take 2 tablets (650 mg total) by mouth every 6 (six) hours as needed. 100 tablet 12     bromfenac (BROMDAY) 0.09 % ophthalmic solution Apply 2 drops each daily 5 mL 5     ketorolac (ACULAR LS) 0.4 % Drop APPLY 2 DROPS IN EACH EYE DAILY AS NEEDED 5 mL 1     naproxen (NAPROSYN) 500 MG tablet TAKE 1 PILL BY MOUTH TWO TIMES A DAY WITH MEALS 60 tablet 2     oxyCODONE-acetaminophen (PERCOCET) 5-325 mg per tablet Take 1 tablet by mouth every 6 (six) hours as needed for pain.       sodium chloride (OCEAN) 0.65 % nasal spray 1 spray into each nostril as needed for congestion.       traZODone (DESYREL) 50 MG tablet TAKE 1 PILL (50 MG TOTAL) BY MOUTH BEDTIME FOR SLEEP 30 tablet 11     No current facility-administered medications for this visit.        Total Data Points: 1

## 2021-06-12 NOTE — PROGRESS NOTES
[facility] Care Coordination Contact    Called member to schedule annual HRA home visit. Left a message requesting a return call to schedule HRA.  Care Coordinator called with .  Care Coordinator tasked CMS to mail UTR Letter.     JANNETH Gonzalez  [facility]  Phone: [phone]

## 2021-06-12 NOTE — TELEPHONE ENCOUNTER
Refill Approved    Rx renewed per Medication Renewal Policy. Medication was last renewed on 10/171/19.    Chantale Ferro, [facility] Triage/Med Refill 10/22/2020     Requested Prescriptions   Pending Prescriptions Disp Refills     cetirizine (ZYRTEC) 10 MG tablet [Pharmacy Med Name: CETIRIZINE HCL 10 MG TABS 10 Tablet] 30 tablet 3     Sig: TAKE 1 PILL BY MOUTH EVERYDAY FOR ALLERGY       Antihistamine Refill Protocol Passed - 10/20/2020  7:59 AM        Passed - Patient has had office visit/physical in last year     Last office visit with prescriber/PCP: 12/19/2019 Shree Dyson MD OR same dept: 12/19/2019 Shree Dyson MD OR same specialty: 12/19/2019 Shree Dyson MD  Last physical: 3/12/2019 Last MTM visit: Visit date not found   Next visit within 3 mo: Visit date not found  Next physical within 3 mo: Visit date not found  Prescriber OR PCP: Shree Dyson MD  Last diagnosis associated with med order: 1. Postnasal drip  - cetirizine (ZYRTEC) 10 MG tablet [Pharmacy Med Name: CETIRIZINE HCL 10 MG TABS 10 Tablet]; TAKE 1 PILL BY MOUTH EVERYDAY FOR ALLERGY  Dispense: 30 tablet; Refill: 3    If protocol passes may refill for 12 months if within 3 months of last provider visit (or a total of 15 months).

## 2021-06-13 NOTE — PROGRESS NOTES
[facility] Care Coordination Contact    Called member to schedule annual HRA home visit. HRA has been scheduled for 11/12/20 at 1:00.     JANNETH Gonzalez  [facility]  Phone: [phone]

## 2021-06-13 NOTE — PROGRESS NOTES
"[facility] Daily Progress Note  Patient Name: Mariusz Hebert \"cho\"  Date of evaluation: 10/13/2017  Today's Date: 10/27/2017  Visit Number: 4 of 12 (per PT POC) non MedX  Referring provider: Guillermina Palmer PA-C  Referral Diagnosis: LBP  Visit Diagnosis:     ICD-10-CM    1. Lumbar radiculitis M54.16    2. Lumbar facet arthropathy M12.88        Assessment:       Patient comes to PT with new order to add hip pain to treatment. Pain seems consistent with mild greater trochanteric bursitis, compounded by likely hip arthritis.     Patient responds positively to TENs application. Has started to do his exercises at home and was able to tolerate progression of WB/standing exercises well today.    Patient agrees to plan for PT to work with him for 1-2 times per week for up to 12 visits for now and will continue to progress as able.     Patient's expectations/goals are: Realistic  Barriers to Learning or Achieving Goals: chronicity, language barrier    Goals:  Pt. will be independent with home exercise program in : 6 weeks  Pt will: walk for 15+ minutes with SEC daily with pain 4/10 or less for improved phycsical activity/endurance in 6 weeks  Pt will: report increasing activity around his home with pain 4/10 or less in 6 weeks       Plan:      Plan for next visit: Use TENs during exercises on right leg to \"wake up leg and connect it with his brain\" This is what has been explained to patient. He is getting TENs unit in the mail, so may need some help with how to use it if he brings it in. Work on standing exercises/walking and putting weight on right leg more.      Subjective:       Pain rating today: not rated    Patient comes to PT with updated order to assess hip pain.     Hip has been bother him for a long time. On the right side in the groin lateral leg. Walking makes it worse. Medication and exercise help. They did get the TENS, but have not tried it yet.     Functional limitations:  Walking, activities around the " "house.        Objective:       Hip ROM  Date: 10/27/17      PROM in degrees PROM in degrees PROM in degrees    Right Left Right Left Right Left   Hip Flexion (0-120 ) 95        Hip Abduction (0-45 )         Hip External Rotation (0-50 ) 45        Hip Internal Rotation (0-40 ) 15        Hip Extension (0-15 )           FADIR + lateral hip and groin  ELAINE + lateral hip and groin  Tenderness to lateral hip at greater trochanter.    Exercises: see flowsheet for date performed in clinic.  Exercise #1: supine SKTC reviewed  Comment #1: Supine SLR Slider reviewed  Exercise #2: supine LTR reviewed  Comment #2: standing BEST stretch HEP  Exercise #3: DKTC HEP  Comment #3: standing HS curl (significantly reduced ROm on Right 10x bilat  Exercise #4: standing heel/toe raises 10x double leg  Exercise #5: Standing Mini squats X 10   Comment #5: Step ups 4\" block X 10   Exercise #6: Standing hip abduction X 10        Treatment Today   10/27/2017  TREATMENT MINUTES COMMENTS   Evaluation     Self-care/ Home management     Manual therapy     Neuromuscular Re-education     Therapeutic Activity     Therapeutic Exercises 30 Wore TENs with exercises today, hip assessment X 10 minutes.   Gait training     Modality__________________ 20 TENS lateral hip/anterior proximal thigh, right gastroc and lateral ankle.   SMP mode  Time included for set up and take off.  Done during therex today              Total 30 total time 1 unit of each, they overlapped   Blank areas are intentional and mean the treatment did not include these items.        Sal SELBY, DPT, CLT  10/27/2017  9:10 AM               "

## 2021-06-13 NOTE — PROGRESS NOTES
Assessment:   Mariusz Hebert is a 67 y.o. y.o. male with past medical history significant for BPH, hypercholesterolemia who presents today for follow-up regarding chronic right low back pain with radiation into the right lower extremity with associated numbness and tingling.  Patient has a history of a decompressive lumbar laminectomy in 2011.  MRI of the lumbar spine at L4-5 which results in moderate to severe spinal canal stenosis as well as severe right foraminal stenosis.  The patient had a sensory deficit in the right L5 dermatome on exam.  Suspect that the patient also has osteoarthritis of the right hip.  He does have right groin pain which is aggravated with internal rotation of the right hip.       Plan:     A shared decision making plan was used.  The patient's values and choices were respected.  The following represents what was discussed and decided upon by the physician assistant and the patient.  A professional  is present for the visit.    1.  DIAGNOSTIC TESTS: I reviewed the MRI lumbar spine.  No further diagnostic tests were ordered.    2.  PHYSICAL THERAPY: I had referred the patient to physical therapy when I saw him on September 21.  I encouraged him to move forward with scheduling his initial evaluation.    3.  MEDICATIONS: No changes are made to the patient's medications.  The patient is not quite sure how he is taking his medication, but according to his medication list he takes gabapentin 600 mg 3 times daily, Cymbalta 600 mg daily, and diclofenac 50 mg daily.    4.  INTERVENTIONS: I offered the patient a right L4-5 transforaminal epidural steroid injection.  The patient indicated he would like to proceed and an order was placed.  If the patient fails to improve, recommend a right L5-S1 transforaminal epidural steroid injection.    5.  PATIENT EDUCATION: I did explain to the patient that sometimes people with spondylolisthesis ultimately need surgery to get relief.  The patient would  like to try nonsurgical treatments first.    6.  FOLLOW-UP: The patient will return to the clinic for his right L4-5 transforaminal epidural steroid injection.  If he has any questions or concerns, he should not hesitate to contact our clinic.    Subjective:     Mariusz Hebert is a 67 y.o. male who presents today for follow-up regarding chronic right low back pain with radiation into the right lower extremity.  I last saw the patient September 21, 2017.  At that time I ordered an MRI lumbar spine.  Also refer the patient to physical therapy.  The patient returns to review his MRI results.  He has not yet physical therapy.    The patient states that since his last seen his pain has become slightly more severe.  He complains of pain in the right low back.  The pain radiates in the right buttock, down the right lateral thigh, and into the anterior shin, ending at the ankle.  It does not reach the foot.  He also has right groin pain.  He rates his pain today as a 6 out of 10.  At its best it is a 5 out of 10.  At its worst it is a 9 out of 10.  Patient's pain is aggravated with moving around alleviated with rest.  He denies any new symptoms since he was last seen.  He has numbness and tingling in the same distribution as his pain.  He denies weakness.    As mentioned, he has not yet started physical therapy.  He has had physical therapy for his back, but this was several years ago.  The patient is not quite sure how he is taking his medications, but according to his chart he uses gabapentin 600 mg 3 times daily, Cymbalta 60 mg daily, and diclofenac 50 mg daily.    Past medical history is reviewed and is unchanged in the interim.    Family history is reviewed and is unchanged in the interim    Review of Systems:    Positive for numbness/tingling, headache, blurry vision.  Negative loss of bowel/bladder control, footdrop, weakness, dizziness, nausea/vomiting, balance changes.     Objective:   CONSTITUTIONAL:  Vital signs as  above.  No acute distress.  The patient is well nourished and well groomed.    PSYCHIATRIC:  The patient is awake, alert, oriented to person, place and time.  The patient is answering questions appropriately with clear speech.  Normal affect.  HEENT: Normocephalic, atraumatic.  Sclera clear.    SKIN:  Skin over the face, posterior torso, bilateral upper and lower extremities is clean, dry, intact without rashes.  MUSCULOSKELETAL:  Gait is non-antalgic.    The patient has 5/5 strength for the bilateral hip flexors, knee flexors/extensors, ankle dorsiflexors/plantar flexors, ankle evertors/invertors.    NEUROLOGICAL: Subjective diminished sensation in the right anterior shin.     RESULTS: MRI lumbar spine from [facility] dated October 7, 2070 was reviewed.  This shows 8 mm of degenerative anterolisthesis of L4 on L5.  This results in moderate to severe spinal canal stenosis.  The patient also has moderate to severe spinal canal stenosis due to degenerative changes superimposed on developmental spinal canal stenosis.  At L4-5 there is moderate to severe bilateral facet arthropathy, severe right foraminal stenosis, and moderate to severe left foraminal stenosis.  Please see report for further details.

## 2021-06-13 NOTE — PROGRESS NOTES
"[facility] Daily Progress Note  Patient Name: Mariusz Hebert \"cho\"  Date of evaluation: 10/13/2017  Today's Date: 10/31/2017  Visit Number: 5 of 12 (per PT POC) non MedX  Referring provider: Guillermina Palmer PA-C  Referral Diagnosis: LBP  Visit Diagnosis:     ICD-10-CM    1. Lumbar radiculitis M54.16    2. Lumbar facet arthropathy M12.88    3. Chronic right hip pain M25.551     G89.29        Assessment:       Patient comes to PT with new order to add hip pain to treatment. Pain seems consistent with mild greater trochanteric bursitis, compounded by likely hip arthritis.     Patient responds positively to TENs application. He has a home unit now and we spent the session discussing how to use it at home. Patient's son was present, as well as , both patient and son verbalize understanding of use/care/safety with Home TENs unit.     Further visits, we will focus on exercises and continue with education on the importance of movement.     Patient does go to adult group exercise weekly.     Patient's expectations/goals are: Realistic  Barriers to Learning or Achieving Goals: chronicity, language barrier    Goals:  Pt. will be independent with home exercise program in : 6 weeks  Pt will: walk for 15+ minutes with SEC daily with pain 4/10 or less for improved phycsical activity/endurance in 6 weeks  Pt will: report increasing activity around his home with pain 4/10 or less in 6 weeks       Plan:      Plan for next visit: . Work on standing exercises/walking and putting weight on right leg more. Use TENs during ex if needed.   Patient now has one at home.     Subjective:       Pain rating today: not rated d/t chronicity of pain.     Hip has been bother him for a long time. On the right side in the groin lateral leg. Walking makes it worse. Medication and exercise help.     Functional limitations:  Walking, activities around the house.        Objective:       Focused on Home TENs unit use during session today. " "Educated patient on use/care/safety of home TENs and patient practiced applying electrodes and using it himself. He demonstrates and verbalizes understanding of use.     Hip ROM  Date: 10/27/17      PROM in degrees PROM in degrees PROM in degrees    Right Left Right Left Right Left   Hip Flexion (0-120 ) 95        Hip Abduction (0-45 )         Hip External Rotation (0-50 ) 45        Hip Internal Rotation (0-40 ) 15        Hip Extension (0-15 )           Exercises: see flowsheet for date performed in clinic.  Exercise #1: supine SKTC reviewed  Comment #1: Supine SLR Slider reviewed  Exercise #2: supine LTR reviewed  Comment #2: standing BEST stretch HEP  Exercise #3: DKTC HEP  Comment #3: standing HS curl (significantly reduced ROm on Right 10x bilat  Exercise #4: standing heel/toe raises 10x double leg  Exercise #5: Standing Mini squats X 10   Comment #5: Step ups 4\" block X 10   Exercise #6: Standing hip abduction X 10   Comment #6: SKFO X 10        Treatment Today   10/31/2017  TREATMENT MINUTES COMMENTS   Evaluation     Self-care/ Home management     Manual therapy     Neuromuscular Re-education     Therapeutic Activity     Therapeutic Exercises     Gait training     Modality__________________ 30 TENs set up using his own TENs unit from home. See above              Total 30    Blank areas are intentional and mean the treatment did not include these items.        Rizwana Shepherd, DEMETRIT, CLT  10/31/2017  9:10 AM               "

## 2021-06-13 NOTE — PROGRESS NOTES
"[facility] Care Coordination Contact    Per CC, mailed client an \"Unable to Contact\" letter.    Aviva Barton  Care Management Specialist  [facility]  [phone]    "

## 2021-06-13 NOTE — PROGRESS NOTES
"[facility] Daily Progress Note  Patient Name: Mariusz Hebert \"cho\"  Date of evaluation: 10/13/2017  Today's Date: 10/24/2017  Visit Number: 3 of 12 (per PT POC) non MedX  Referring provider: Guillermina Palmer PA-C  Referral Diagnosis: LBP  Visit Diagnosis:     ICD-10-CM    1. Lumbar radiculitis M54.16    2. Lumbar facet arthropathy M12.88        Assessment:      Patient responds positively to TENs application. Has started to do his exercises at home.     Patient agrees to plan for PT to work with him for 1-2 times per week for up to 12 visits for now and will continue to progress as able.     Patient's expectations/goals are: Realistic  Barriers to Learning or Achieving Goals: chronicity, language barrier    Goals:  Pt. will be independent with home exercise program in : 6 weeks  Pt will: walk for 15+ minutes with SEC daily with pain 4/10 or less for improved phycsical activity/endurance in 6 weeks  Pt will: report increasing activity around his home with pain 4/10 or less in 6 weeks       Plan:      Plan for next visit: Use TENs during exercises on right leg to \"wake up leg and connect it with his brain\" This is what has been explained to patient. He is getting TENs unit in the mail, so may need some help with how to use it if he brings it in. Work on standing exercises/walking and putting weight on right leg more.      Subjective:       Pain rating today: not rated  TENs helped last time while it was on.     Functional limitations:  Walking, activities around the house.        Objective:       Patient states the TENs feels like it is going to help.   He has one ordered from [employer] and coming in the mail soon.  Standing HS curl give patient pain/tingling in right Leg.     Patient reports tingling in his whole right leg during and after session, discussed that this can be ok, it may be that his right leg is starting to do more activity with the exercises and the TENs unit is bringing awareness to his right leg. " Patient verbalizes understanding.     Exercises: see flowsheet for date performed in clinic.  Exercise #1: supine SKTC reviewed  Comment #1: Supine SLR Slider reviewed  Exercise #2: supine LTR reviewed  Comment #2: standing BEST stretch HEP  Exercise #3: DKTC HEP       Treatment Today   10/24/2017  TREATMENT MINUTES COMMENTS   Evaluation     Self-care/ Home management     Manual therapy     Neuromuscular Re-education     Therapeutic Activity     Therapeutic Exercises 25 Wore TENs with exercises today   Gait training     Modality__________________ 30 TENS lateral hip/anterior proximal thigh, right gastroc and lateral ankle.   SMP mode  Time included for set up and take off.  Done during therex today              Total 30 total time 1 unit of each, they overlapped   Blank areas are intentional and mean the treatment did not include these items.        Rizwana Shepherd DPT, CLT  10/24/2017  9:10 AM

## 2021-06-13 NOTE — PROGRESS NOTES
[facility]   Lumbar Initial Evaluation  Patient Name: Mariusz Hebert  Date of evaluation: 10/13/2017  Today's Date: 10/17/2017  Visit Number: 1 of 12 (per PT POC) non MedX  Referring provider: Guillermina Palmer PA-C  Referral Diagnosis: LBP  Visit Diagnosis:     ICD-10-CM    1. Lumbar radiculitis M54.16    2. Lumbar facet arthropathy M12.88        Assessment:      Patient responds positively to TENs application. Has started to do his exercises at home.     Patient agrees to plan for PT to work with him for 1-2 times per week for up to 12 visits for now and will continue to progress as able.     Patient's expectations/goals are: Realistic  Barriers to Learning or Achieving Goals: chronicity, language barrier    Goals:  Pt. will be independent with home exercise program in : 6 weeks  Pt will: walk for 15+ minutes with SEC daily with pain 4/10 or less for improved phycsical activity/endurance in 6 weeks  Pt will: report increasing activity around his home with pain 4/10 or less in 6 weeks       Plan:      Plan for next visit: TENs as needed, gentle therex to work on at home, possibly reformer     Subjective:       Pain rating today:4  TENs helped last time while it was on.     Functional limitations:  Walking, activities around the house.        Objective:       Patient states the TENs feels like it is going to help.     Exercises: see flowsheet for date performed in clinic.  Exercise #1: supine SKTC reviewed  Comment #1: Supine SLR Slider reviewed  Exercise #2: supine LTR reviewed  Comment #2: standing BEST stretch HEP  Exercise #3: DKTC HEP       Treatment Today   10/17/2017  TREATMENT MINUTES COMMENTS   Evaluation     Self-care/ Home management     Manual therapy     Neuromuscular Re-education     Therapeutic Activity     Therapeutic Exercises 15 Overlapped the TENs application. Verbal review of HEP. And gave handouts to be clear on the exercises.   Gait training     Modality__________________ 20 TENS lateral  hip/anterior proximal thigh, right gastroc and lateral ankle. Patient in left sidelying.   SMP mode  Time included for set up and take off.              Total 30 total time    Blank areas are intentional and mean the treatment did not include these items.        DEMETRI ParikhT, CLT  10/17/2017  9:10 AM

## 2021-06-13 NOTE — PROGRESS NOTES
[facility]   Lumbar Initial Evaluation  Patient Name: Mariusz Hebert  Date of evaluation: 10/13/2017  Today's Date: 10/13/2017  Visit Number: 1 of 12 (per PT POC) non MedX  Referring provider: Guillermina Palmer PA-C  Referral Diagnosis: LBP  Visit Diagnosis: No diagnosis found.    Assessment:        Mariusz is a 67 y.o. male who presents to physical therapy today with complaints of right LBP, right lateral and anterior thigh pain, down to right knee and ankle/foot that began 6 years ago without known onset. Patient had Lumbar surgery in 2011. States this helped the pain, but then it returned. Clinical exam today reveals impaired gait relying heavily on cane, decreased ROM in trunk and hip, pain with palpation. Symptoms are consistent with a chronic right lumbar radiculopathy. Patient may benefit from further PT in order to improve function.   Patient agrees to plan for PT to work with him for 1-2 times per week for up to 12 visits for now and will continue to progress as able.     Patient's expectations/goals are: Realistic  Barriers to Learning or Achieving Goals: chronicity, language barrier    Goals:  Pt. will be independent with home exercise program in : 6 weeks  Pt will: walk for 15+ minutes with SEC daily with pain 4/10 or less for improved phycsical activity/endurance in 6 weeks  Pt will: report increasing activity around his home with pain 4/10 or less in 6 weeks       Plan:        Plan for next visit: TENs as needed, gentle therex to work on at home, possibly reformer    Plan of Care:   Authorization / Certification Start Date: 10/13/17  Authorization / Certification End Date: 01/12/18  Authorization / Certification Number of Visits: 12  Communication with: Referral Source  Patient Related Instruction: Nature of Condition;Treatment plan and rationale;Self Care instruction;Basis of treatment;Body mechanics;Posture  Discharge Planning: to self care/HEP       Subjective:         History of Present Illness:     Mariusz is a 67 y.o. male who presents to physical therapy today with complaints of LBP that began 2011 without known injury at the time. Pt reports symptoms are better with pain medication temporarily and worse with activity.    Patient denies urinary or bowel incontinence, unintentional weight loss, saddle anesthesia, fever or chills.    Uses cane, has for a long time, uses it d/t pain in right low back/right leg and balance.    Treatments tried: injections in bilat knees.   Surgery in 2011 for lumbar discectomy, this helped, then pain returned.   Patient states he has been told that his disc is narrowing and that he needs to be on pain medication for a long time to feel better.     Pertinent PMH: BPH, hypercholesterolemia   Social information: lives with son, has stairs that he has to do, relies heavily on railing.     Pain rating today:5  Pain rating at best: 4  Pain rating at worst: 8  Pain description: right low back and leg pain.     Functional limitations:  Walking, activities around the house.        Objective:      Note: Items left blank indicates the item was not performed or not indicated at the time of the evaluation.    Lumbopelvic Examination  No diagnosis found.    Precautions/Restrictions: None    Involved side: Right    Observation: relies heavily on cane with walking, pain /difficulty with rolling on mat.    Lumbar ROM:    Flexion: 22cm fingers to floor   Extension: full ROM  Side bending: fingers to knee on left, fingers not to knee on right increased pain with right side bending     Lower Extremity Strength:     5/5 bilat hip flexion, knee ext, DF, left knee flexion  4/5 right knee flexion, patient reports it feels weak.    Sensation   Sensory deficit on right anterior thigh and L5 dermaotme pattern     Reflex Testing: NT today    Lumbar Special Tests:   SLR + on right for neural tension/back pain at 50deg  SLR on left is about 60 deg and non painful.  Slump is + on both sides for right low back  pain    Repeated Motion Testing: worse with more motion in general    Passive Mobility - Joint Integrity:did not assess PA mobility, appears to be stiff    LE Screen: decrease in hip ROM, pain in right lateral hip/groin with testing    Palpation: tender to right lower back/lateral thigh/anterior thigh    Patient Outcome Measures:    Modified Oswestry Low Back Pain Disablity Questionnaire  in %: 32   Scores range from 0-100%, where a score of 0% represents minimal pain and maximal function. The minimal clinically important difference is a score reduction of 12%.    Treatment Today     TREATMENT MINUTES COMMENTS   Evaluation 20 lumbar   Self-care/ Home management 10 Ed to start back up on the exercises he has done in the past, ed on working with PT for a few weeks to get moving and work on TENs if needed. Patient was educated that this is a long journey to be on with improving chronic pain.   Manual therapy     Neuromuscular Re-education     Therapeutic Activity     Therapeutic Exercises     Gait training     Modality__________________ 25 TENS right low back and lateral hip/anterior proximal thigh. Patient in left sidelying. Ed on how TENs works.   SMP mode              Total 55    Blank areas are intentional and mean the treatment did not include these items.   PT Evaluation Code: (Please list factors)  Patient History/Comorbidities: BPH, hypercholesterolemia   Examination: decreased ROM, decreased function, impaired gait  Clinical Presentation: Stable  Clinical Decision Making: low    Patient History/  Comorbidities Examination  (body structures and functions, activity limitations, and/or participation restrictions) Clinical Presentation Clinical Decision Making (Complexity)   No documented Comorbidities or personal factors 1-2 Elements Stable and/or uncomplicated Low   1-2 documented comorbidities or personal factor 3 Elements Evolving clinical presentation with changing characteristics Moderate   3-4 documented  comorbidities or personal factors 4 or more Unstable and unpredictable High                  Rizwana Shepherd DPT, CLT  10/13/2017  9:10 AM

## 2021-06-13 NOTE — PROGRESS NOTES
[facility] Care Coordination Contact    [facility]:  Emailed completed PCA assessment to [facility].  Faxed copy of PCA assessment to [facility] and mailed copy to member.  Faxed MD Communication to PCP.     Mailed PCA Signature page and POC Signature page to member w/ self-stamped envelope for return.    Aviva Barton  Care Management Specialist  [facility]  [phone]

## 2021-06-13 NOTE — PROGRESS NOTES
ASSESSMENT: Mariusz Hebert is a 67 y.o. male with past medical history significant for BPH cholesterolemia who presents today for new patient evaluation of chronic right low back pain with radiation to right lower extremity with associated numbness and tingling in the distribution of the right L5 nerve root.  Patient has history of a decrease of lumbar laminectomy in 2011.  The patient has not  had any recent advanced imaging of the lumbar spine.  A CT lumbar spine from 2011 showed multilevel foraminal stenosis.  The patient underwent a right L4-5 and L5-S1 transforaminal epidural steroid injection ×3 between December 2011 and March 2012 with Dr. Agustin which, according to the chart, did provide significant relief of his pain.  The patient had a sensory deficit in the right L5 dermatome on exam.  Otherwise he was neurologically intact.  I suspect that the patient also has osteoarthritis of the right hip as he did have reproduction of his right groin pain with internal rotation of the right hip.    AGAPITO: 29  NDI 60      PLAN:  A shared decision making model was used.  The patient's values and choices were respected.  The following represents what was discussed and decided upon by the physician assistant and the patient.  A professional  is present for the visit.    1.  DIAGNOSTIC TESTS: I reviewed the CT lumbar spine from 2011.  I placed an order for an MRI lumbar spine for further evaluation.    2.  PHYSICAL THERAPY:  Discussed the importance of core strengthening, ROM, stretching exercises with the patient and how each of these entities is important in decreasing pain.  Explained to the patient that the purpose of physical therapy is to teach the patient a home exercise program.  These exercises need to be performed every day in order to decrease pain and prevent future occurrences of pain.  I placed an order for the patient begin physical therapy at the [place] location.    3.  MEDICATIONS: No changes are  made to the patient's medications.  Uses gabapentin 6 mg 3 times daily, Cymbalta 60 mg daily, and diclofenac 50 mg daily.  Hopefully with some additional treatment, he will be able to minimize his use of these medications.    4.  INTERVENTIONS: No interventions were ordered.  The patient may benefit from interventional pain management if he fails to improve with conservative treatment, pending on the results of an MRI lumbar spine.  I would like to begin with a right L5-S1 transforaminal epidural steroid injection.    5.  PATIENT EDUCATION: The patient is in agreement with the above plan.  All questions were answered.    6.  FOLLOW-UP:   I will see the patient back in the clinic in about 1 week to review the results of his MRI lumbar spine.  If he has any questions or concerns in the meantime, he should not hesitate to contact our clinic.       SUBJECTIVE:  Mariusz Hebert  Is a 67 y.o. male who presents today in consultation at the request of Dr. Dyson for new patient evaluation of low back pain with radiation to the right lower extremity with associated numbness and tingling.  Patient has a history of a decompressive lumbar laminectomy in March 2011 with Dr. Knowles.  The patient was seen by Lupe Chavez CNP in late 2011 and 2012 and underwent a series of 3 right L4-5, L5-S1 transforaminal epidural steroid injections.  According to Lupe Chavez his note on October 26, 2012 the injections did provide significant relief of his pain.  The patient states that his pain has been slowly getting worse.    Patient complains of low back pain.  Pain radiates into the right buttock, down the posterior lateral thigh, into the anterolateral shin, extending to the dorsum of the right foot.  He also has some pain in the right groin.  The patient describes the pain as sharp and aching.  His leg pain is worse than his back pain.  His pain is aggravated with twisting, walking, and cold weather.  His pain is alleviated with lying down and  applying ice.  The patient denies any pain radiating down the left leg, although he does have left knee pain due to known left knee osteoarthritis.  The patient has numbness and tingling in the same distribution as his pain in the right leg.  He feels that the right leg is generally weaker than the left.  She denies any loss of bowel or bladder control.    As mentioned above, the patient had a lumbar laminectomy 2011.  He underwent an MADDY ×3 in 2011 and 2012.  He has not had any more recent spine injections.  The patient does not go to the chiropractor.  The patient has not had any recent physical therapy for his lower back.  He did recently go to physical therapy for his knees.  The patient currently takes gabapentin 600 mg 3 times daily.  He also uses Cymbalta 60 mg daily and diclofenac 50 mg daily.  He and his PCP would like him to build to minimize his use of these medications if possible.    Current Outpatient Prescriptions on File Prior to Encounter   Medication Sig Dispense Refill     cetirizine (ZYRTEC) 10 MG tablet TAKE 1 PILL BY MOUTH EVERY DAY FOR ALLERGIES 90 tablet 2     diclofenac (VOLTAREN) 50 MG EC tablet Take 1 tablet (50 mg total) by mouth 2 (two) times a day. 60 tablet 11     DULoxetine (CYMBALTA) 60 MG capsule TAKE 1 CAPSULE (60 MG TOTAL) BY MOUTH DAILY 30 capsule 10     gabapentin (NEURONTIN) 300 MG capsule TAKE 2 CAPSULES (600 MG TOTAL) BY MOUTH 3 (THREE) TIMES A DAY. 180 capsule 4     oxybutynin (DITROPAN XL) 5 MG ER tablet TAKE 1 TABLET (5 MG TOTAL) BY MOUTH DAILY. 90 tablet 3     tamsulosin (FLOMAX) 0.4 mg Cp24 TAKE 1 CAPSULE (0.4 MG TOTAL) BY MOUTH DAILY AFTER LUNCH. 30 capsule 1     VITAMIN D2 50,000 unit capsule TAKE 1 CAPSULE (50,000 UNITS TOTAL) BY MOUTH ONCE A WEEK. 4 capsule 6     acetaminophen (TYLENOL) 325 MG tablet Take 2 tablets (650 mg total) by mouth every 6 (six) hours as needed. 100 tablet 12     bromfenac (BROMDAY) 0.09 % ophthalmic solution Apply 2 drops each daily 5 mL 5      ketorolac (ACULAR LS) 0.4 % Drop APPLY 2 DROPS IN EACH EYE DAILY AS NEEDED 5 mL 1     naproxen (NAPROSYN) 500 MG tablet TAKE 1 PILL BY MOUTH TWO TIMES A DAY WITH MEALS 60 tablet 2     oxyCODONE-acetaminophen (PERCOCET) 5-325 mg per tablet Take 1 tablet by mouth every 6 (six) hours as needed for pain.       polyethylene glycol 3350 8.5 gram PwPk Take 1 packet by mouth daily. 30 packet 11     polyvinyl alcohol (LIQUIFILM TEARS) 1.4 % ophthalmic solution 1 drop as needed for dry eyes.       sodium chloride (OCEAN) 0.65 % nasal spray 1 spray into each nostril as needed for congestion.       traZODone (DESYREL) 50 MG tablet TAKE 1 PILL (50 MG TOTAL) BY MOUTH BEDTIME FOR SLEEP 30 tablet 11         Allergies   Allergen Reactions     No Known Drug Allergies        Past Medical History:   Diagnosis Date     Constipation     Created by Conversion      Lower Back Pain     Created by Conversion       Patient Active Problem List   Diagnosis     Diverticulosis     Benign prostatic hyperplasia (BPH) with urinary urgency     Drip Or Drainage Down Throat From Above     Constipation     Cervicalgia     Urinary Incontinence     Atopic Dermatitis     Lumbar Canal Stenosis     Neuritis of left lower extremity     Lower Back Pain     Right knee pain     Meniscus, lateral, posterior horn derangement, right     Osteoarthritis of right knee     Chondromalacia of knee, right     Pure hypercholesterolemia         Past Surgical History:   Procedure Laterality Date     GA CORTES W/O FACETEC FORAMOT/[facility]KC 1/2 VRT SEG, CERVICAL      Description: Laminectomy Lumbar;  Recorded: 03/31/2011;  Comments: L4 and L5     GA LAP,INGUINAL HERNIA REPR,INITIAL      Description: Laparoscopy Repair Of Initial Inguinal Hernia;  Proc Date: 10/01/2009;  Comments: Dr Arreola     Family history: None.  Patient states family is healthy.    Social history: The patient is .  He denies tobacco, alcohol, or drug use.    ROS: Positive for dizziness, constipation,  hyperflexible joints.  Specifically negative for bowel/bladder dysfunction, fevers,chills, appetite changes, unexplained weight loss.   Otherwise 13 systems reviewed are negative.  Please see the patient's intake questionnaire from today for details.      OBJECTIVE:  PHYSICAL EXAMINATION:    CONSTITUTIONAL:  Vital signs as above.  No acute distress.  The patient is well nourished and well groomed.  PSYCHIATRIC:  The patient is awake, alert, oriented to person, place, time and answering questions appropriately with clear speech.    HEENT: Normocephalic, atraumatic.  Sclera clear.  Neck is supple.  SKIN:  Skin over the face, bilateral lower extremities, and posterior torso is clean, dry, intact without rashes.    GAIT:  Gait is non-antalgic.  The patient is able to heel and toe walk bilaterally, although this does increase low back pain.  STANDING EXAMINATION: Tender to palpation of the right lower lumbar paraspinous muscles.  Lumbar flexion is minimally restricted due to pain.  Lumbar extension is mildly restricted due to pain.  MUSCLE STRENGTH:  The patient has 5/5 strength for the bilateral hip flexors, knee flexors/extensors, ankle dorsiflexors/plantar flexors, great toe extensors, ankle evertors/invertors.  NEUROLOGICAL: 1+ patellar, and achilles reflexes bilaterally.  Negative Babinski's bilaterally.  No ankle clonus bilaterally.  Subjective diminished sensation in the right L5 dermatome.  VASCULAR:  2/4 dorsalis pedis pulses bilaterally.  Bilateral lower extremities are warm.  There is no pitting edema of the bilateral lower extremities.  ABDOMINAL:  Soft, non-distended, non-tender throughout all quadrants.  No pulsatile mass palpated in the left lower quadrant.  LYMPH NODES:  No palpable or tender inguinal lymph nodes.  MUSCULOSKELETAL: Straight leg raise is positive on the right, negative on the left.  External rotation of the right hip causes right lateral hip pain.  Internal rotation right hip has right  groin pain.    RESULTS: CT lumbar spine from [facility] dated October 26, 2011:  CONCLUSIONS: 1. Postoperative changes of laminectomy L4-L5 and L5-S1. 2. Severe bilateral foraminal stenosis L4-L5. 3. Additional severe right and moderate left foraminal stenosis L5-S1. 4. Mild central canal narrowing and bilateral foraminal narrowing at L3-L4. 5. Congenitally short pedicles result in a narrow central canal and likely predispose to impingement. 6. Facet joint hypertrophy throughout.   Please see report for further details.

## 2021-06-13 NOTE — PROGRESS NOTES
ASSESSMENT AND PLAN:  1. Preventative health care  Influenza High Dose, Seasonal 65+ yrs   2. Lumbar Canal Stenosis results of his latest MRI discussed with him continue medication at current dose, he will be seeing spine clinic this week for recommendations on possible epidural injection DULoxetine (CYMBALTA) 60 MG capsule   3. Neuritis of left lower extremity gabapentin to be continued at current dose will continue duloxetine increases in the dose of caused him to be dizzy so his dose will remain at 1 capsule per day DULoxetine (CYMBALTA) 60 MG capsule   4. Benign prostatic hyperplasia (BPH) with urinary urgency no side effects noted alternate eyedrop given as    5. Allergic conjunctivitis he had no coverage for his previous generic eyedrop          Orders Placed This Encounter   Procedures     Influenza High Dose, Seasonal 65+ yrs     Medications Discontinued During This Encounter   Medication Reason     tamsulosin (FLOMAX) 0.4 mg Cp24 Duplicate order       No Follow-up on file.    CHIEF COMPLAINT:  Chief Complaint   Patient presents with     Follow-up     others     Pt states eye drop is not cover by insurance and would like new one prescribe       HISTORY OF PRESENT ILLNESS:  Mariusz is a 67 y.o. male presenting for a follow-up. Mariusz is present with a Nettie .     BPH: He is taking his medications faithfully. He states that his urination is normal.     Constipation: He denies constipation with use of polyethylene glycol powder.     Back Pain: He is taking his medications for back pain faithfully. He was seen for the first time at the [facility] on 9/21/2017. He has a Lumbar Spine MRI done which revealed mild to moderate multilevel degenerative disc disease superimposed on developmental spinal canal stenosis, resulting in moderate to severe spinal canal compromise at L3-L4 and L4-L5. He will be following-up on 10/5/2017 to discuss possible interventions. He notes that he experiences increased  pain over his back when he lays down.     REVIEW OF SYSTEMS:   He states that he sleeps well at night.   He states that he was unable to  the previously prescribed eye drops as they were not covered by insurance. He is inquiring about a different prescription.   All other 10 point review of systems are negative.    PFSH:  Pertinent past, family, social and medical history reviewed.     TOBACCO USE:  History   Smoking Status     Never Smoker   Smokeless Tobacco     Current User     Types: Chew     Comment: chews betel nut       VITALS:  Vitals:    10/03/17 0814   BP: 126/78   Patient Site: Left Arm   Patient Position: Sitting   Cuff Size: Adult Regular   Pulse: 66   Temp: 97.7  F (36.5  C)   TempSrc: Oral   SpO2: 96%   Weight: 142 lb 6 oz (64.6 kg)     Wt Readings from Last 3 Encounters:   10/03/17 142 lb 6 oz (64.6 kg)   09/21/17 138 lb 12.8 oz (63 kg)   09/20/17 138 lb (62.6 kg)     Body mass index is 28.27 kg/(m^2).    PHYSICAL EXAM:  General: Alert, cooperative, no distress, appears stated age  Musculoskeletal: Tenderness over L4-L5  Lungs: Clear to auscultation bilaterally, respirations unlabored  Chest wall: No tenderness or deformity  Heart: Regular rate and rhythm, S1 and S2 normal, no murmur, rub, or gallop  CVS: No edema noted.   Neurologic: No tremor, no focal findings.     Psych: Oriented x3. Affect normal.     DATA REVIEWED:  Additional History from Old Records Summarized (2): Reviewed Guillermina Palmer PA-C note from 9/21/2017 regarding lumbar stenosis.   Decision to Obtain Records (1): None  Radiology Tests Summarized or Ordered (1): Reviewed Lumbar MRI from 10/2/2017 shows significant spinal compromise from L3-L5.,  Is also severe neuroforaminal compromise at L4  Labs Reviewed or Ordered (1): None  Medicine Test Summarized or Ordered (1): None  Independent Review of EKG or X-RAY(2 each): None    The visit lasted a total of 13 minutes face to face with the patient. Over 50% of the time was spent  counseling and educating the patient about back pain.     I, Nano Garcia, am scribing for and in the presence of, Dr. Hansen.    I, charlotte hansen, personally performed the services described in this documentation, as scribed by Nano Garcia in my presence, and it is both accurate and complete.      MEDICATIONS:  Current Outpatient Prescriptions   Medication Sig Dispense Refill     cetirizine (ZYRTEC) 10 MG tablet TAKE 1 PILL BY MOUTH EVERY DAY FOR ALLERGIES 90 tablet 2     diclofenac (VOLTAREN) 50 MG EC tablet Take 1 tablet (50 mg total) by mouth 2 (two) times a day. 60 tablet 11     DULoxetine (CYMBALTA) 60 MG capsule TAKE 1 CAPSULE (60 MG TOTAL) BY MOUTH DAILY 30 capsule 10     gabapentin (NEURONTIN) 300 MG capsule TAKE 2 CAPSULES (600 MG TOTAL) BY MOUTH 3 (THREE) TIMES A DAY. 180 capsule 4     oxybutynin (DITROPAN XL) 5 MG ER tablet TAKE 1 TABLET (5 MG TOTAL) BY MOUTH DAILY. 90 tablet 3     polyethylene glycol (GLYCOLAX) 17 gram/dose powder MIX 17 GRAMS IN 8 OZ WATER AND DRINK BY MOUTH DAILY 527 g 5     tamsulosin (FLOMAX) 0.4 mg Cp24 TAKE 1 CAPSULE (0.4 MG TOTAL) BY MOUTH DAILY AFTER LUNCH. 30 capsule 11     traZODone (DESYREL) 50 MG tablet TAKE 1 PILL (50 MG TOTAL) BY MOUTH BEDTIME FOR SLEEP 30 tablet 11     VITAMIN D2 50,000 unit capsule TAKE 1 CAPSULE (50,000 UNITS TOTAL) BY MOUTH ONCE A WEEK. 4 capsule 3     acetaminophen (TYLENOL) 325 MG tablet Take 2 tablets (650 mg total) by mouth every 6 (six) hours as needed. 100 tablet 12     bromfenac (BROMDAY) 0.09 % ophthalmic solution Apply 2 drops each daily 5 mL 5     ketorolac (ACULAR LS) 0.4 % Drop APPLY 2 DROPS IN EACH EYE DAILY AS NEEDED 5 mL 1     naproxen (NAPROSYN) 500 MG tablet TAKE 1 PILL BY MOUTH TWO TIMES A DAY WITH MEALS 60 tablet 2     oxyCODONE-acetaminophen (PERCOCET) 5-325 mg per tablet Take 1 tablet by mouth every 6 (six) hours as needed for pain.       polyvinyl alcohol (LIQUIFILM TEARS) 1.4 % ophthalmic solution 1 drop as needed for  dry eyes.       sodium chloride (OCEAN) 0.65 % nasal spray 1 spray into each nostril as needed for congestion.       No current facility-administered medications for this visit.        Total Data Points: 3

## 2021-06-13 NOTE — PROGRESS NOTES
ASSESSMENT AND PLAN:  1. Benign prostatic hyperplasia (BPH) with urinary urgency currently Avodart is been removed from his regimen, he continues to have irregular urinary flow with the Flomax no side effects noted dizziness has resolved    2. Lumbar Canal Stenosis continued to rate his pain at about 5 with all medications 7-8 if he does not take medication would like him to see the spine clinic again and I am anticipating that therapeutic injection or increased PT may decrease his need for taking medication Ambulatory referral to Spine Care   3. Pure hypercholesterolemia asked him to modify his diet cholesterol is elevated, he would not be a suitable candidate for statin because of his muscular pain    4. Urinary Incontinence medication refill        CHIEF COMPLAINT:  Chief Complaint   Patient presents with     Headache     Hypertension       HISTORY OF PRESENT ILLNESS:  Mariusz is a 67 y.o. male presenting for a follow-up. Mariusz is present with a Wheelz .     BPH: He states that his urine flow is good. His symptoms are currently controlled with Flomax.     Constipation: He is using the Polyethylene Glycol powder faithfully. He notes his constipation is controlled with the powder.      Hyperlipidemia: His lipid profile from 8/2/2017 revealed an elevated cholesterol and LDL level of 240 and 176 respectively. He notes that he does not eat fried or oily foods often.     Lumbar Stenosis: He rates his leg pain as a 5/10 with medication and a 7/10 without a medications.     Hypertension: He believes that his blood pressure has elevated for the past week. He is experiencing a headache and neck stiffness. His blood pressure today is 116/84.    REVIEW OF SYSTEMS:   He notes that his dizziness has resolved.    He denies sore throat.   All other 10 point review of systems are negative.    PFSH:  Reviewed as below.     TOBACCO USE:  History   Smoking Status     Never Smoker   Smokeless Tobacco     Current User     Types:  Chew     Comment: chews betel nut       VITALS:  Vitals:    09/20/17 0807   BP: 116/84   Patient Site: Left Arm   Patient Position: Sitting   Cuff Size: Adult Regular   Pulse: 84   Resp: 20   Temp: 98.1  F (36.7  C)   TempSrc: Oral   Weight: 138 lb (62.6 kg)     Wt Readings from Last 3 Encounters:   09/20/17 138 lb (62.6 kg)   08/02/17 142 lb 1 oz (64.4 kg)   05/02/17 146 lb 1 oz (66.3 kg)     Body mass index is 27.41 kg/(m^2).    PHYSICAL EXAM:  General: Alert, cooperative, no distress, appears stated age  Head: Normocephalic, without obvious abnormality, atraumatic  Back: Symmetric, no curvature, ROM normal, no CVA tenderness  Lungs: Clear to auscultation bilaterally, respirations unlabored  Chest wall: No tenderness or deformity  Heart: Regular rate and rhythm, S1 and S2 normal, no murmur, rub, or gallop  Neurologic:  A & O x 3.  No tremor, no focal findings.      DATA REVIEWED:  Additional History from Old Records Summarized (2): None  Decision to Obtain Records (1): None  Radiology Tests Summarized or Ordered (1): none  Labs Reviewed or Ordered (1): Reviewed Lipid Profile from 8/2/2017.   Medicine Test Summarized or Ordered (1): None  Independent Review of EKG or X-RAY(2 each): None    The visit lasted a total of 15 minutes face to face with the patient. Over 50% of the time was spent counseling and educating the patient about multiple conditions.     INano, am scribing for and in the presence of, Dr. Dyson.    charlotte LOCO, personally performed the services described in this documentation, as scribed by Nano Garcia in my presence, and it is both accurate and complete.      MEDICATIONS:  Current Outpatient Prescriptions   Medication Sig Dispense Refill     cetirizine (ZYRTEC) 10 MG tablet TAKE 1 PILL BY MOUTH EVERY DAY FOR ALLERGIES 90 tablet 2     diclofenac (VOLTAREN) 50 MG EC tablet Take 1 tablet (50 mg total) by mouth 2 (two) times a day. 60 tablet 11     DULoxetine (CYMBALTA) 60 MG  capsule TAKE 1 CAPSULE (60 MG TOTAL) BY MOUTH DAILY 30 capsule 10     gabapentin (NEURONTIN) 300 MG capsule TAKE 2 CAPSULES (600 MG TOTAL) BY MOUTH 3 (THREE) TIMES A DAY. 180 capsule 4     oxybutynin (DITROPAN XL) 5 MG ER tablet TAKE 1 TABLET (5 MG TOTAL) BY MOUTH DAILY. 90 tablet 3     oxyCODONE-acetaminophen (PERCOCET) 5-325 mg per tablet Take 1 tablet by mouth every 6 (six) hours as needed for pain.       polyethylene glycol 3350 8.5 gram PwPk Take 1 packet by mouth daily. 30 packet 11     tamsulosin (FLOMAX) 0.4 mg Cp24 TAKE 1 CAPSULE (0.4 MG TOTAL) BY MOUTH DAILY AFTER LUNCH. 30 capsule 1     VITAMIN D2 50,000 unit capsule TAKE 1 CAPSULE (50,000 UNITS TOTAL) BY MOUTH ONCE A WEEK. 4 capsule 6     acetaminophen (TYLENOL) 325 MG tablet Take 2 tablets (650 mg total) by mouth every 6 (six) hours as needed. 100 tablet 12     bromfenac (BROMDAY) 0.09 % ophthalmic solution Apply 2 drops each daily 5 mL 5     ketorolac (ACULAR LS) 0.4 % Drop APPLY 2 DROPS IN EACH EYE DAILY AS NEEDED 5 mL 1     naproxen (NAPROSYN) 500 MG tablet TAKE 1 PILL BY MOUTH TWO TIMES A DAY WITH MEALS 60 tablet 2     polyvinyl alcohol (LIQUIFILM TEARS) 1.4 % ophthalmic solution 1 drop as needed for dry eyes.       sodium chloride (OCEAN) 0.65 % nasal spray 1 spray into each nostril as needed for congestion.       traZODone (DESYREL) 50 MG tablet TAKE 1 PILL (50 MG TOTAL) BY MOUTH BEDTIME FOR SLEEP 30 tablet 11     No current facility-administered medications for this visit.        Total Data Points: 1

## 2021-06-13 NOTE — PROGRESS NOTES
Assessment:   Mariusz Hebert is a 67 y.o. y.o. male with past medical history significant for BPH, hypercholesterolemia who presents today for follow-up regarding chronic right low back pain with radiation into the right lower extremity with associated numbness and tingling.  Patient has a history of a decompressive lumbar laminectomy in 2011.  MRI lumbar spine shows degenerative spondylolisthesis at L4-5 which results in moderate to severe spinal canal stenosis as well as severe right foraminal stenosis.  Patient has a sensory deficit in the right L5 dermatome.  The patient is status post a right L4-5 transforaminal epidural steroid injection on October 12, 2017 which provided 55% relief of his pain.  -The patient also has right lateral hip and groin pain.  I suspect that the patient has osteoarthritis of the right hip and mild right trochanteric bursitis.       Plan:     A shared decision making plan was used.  The patient's values and choices were respected.  The following represents what was discussed and decided upon by the physician assistant and the patient.  A professional  is present for the visit.    1.  DIAGNOSTIC TESTS: I reviewed the MRI lumbar spine.  No further diagnostic tests were ordered.  We may need to obtain imaging studies of the right hip for further evaluation.    2.  PHYSICAL THERAPY: The patient is currently in physical therapy.  He has had 3 sessions focusing on his low back and leg pain.  I did place a new order so that they can also evaluate and treat his right hip.    3.  MEDICATIONS: No changes are made to the patient's medications.  The patient not quite sure how he is taking his medication, but according to his medication list he takes gabapentin 600 mg 3 times daily, diclofenac 50 mg 2 times daily, Tylenol 325 mg as needed.      4.  INTERVENTIONS:    -I offered a right L5-S1 transforaminal epidural steroid injection to see if this could add any additional relief since he had  55% relief of his pain with a right L4-5 transforaminal epidural steroid injection.  The patient declined.  -The patient could also potentially benefit from a right trochanteric bursa injection or a right intra-articular hip joint injection.    5.  PATIENT EDUCATION: The patient was in agreement with the above plan.  All questions were answered.  -I did tell the patient that if his symptoms were to worsen, he may need surgery given his spondylolisthesis.  The patient would really like to avoid additional back surgery possible.    6.  FOLLOW-UP: I will see the patient back in the clinic in 4 weeks to follow-up.  If you have any questions or concerns in the meantime, he should not hesitate to clinic.    Subjective:     Mariusz Hebert is a 67 y.o. male who presents today for follow-up regarding right low back pain with radiation into the right lower extremity with associated numbness and tingling.  The patient also has right groin pain.  The patient status post a right L4-5 transforaminal epidural steroid injection on October 12, 2017.  The patient reports that this injection provided 55% relief of his pain.    The patient continues have right low back pain.  The pain radiates into the right buttock and lateral hip.  He has numbness which extends down the lateral thigh into the lateral calf, to the anterior ankle.  The patient also has pain in the right groin.  Patient rates his pain today as a 4 out of 10.  At its best it is a 4 out of 10.  At its worst it is a 8 out of 10.  Patient's pain is aggravated with walking and alleviated with rest.  He denies any new symptoms since he was last seen.    The patient is currently in physical therapy.  He has had 3 sessions.  He is doing his home exercises.  Patient not sure how he is taking his medications.  According to his medication list, he is taking gabapentin 600 mg 3 times daily, Tylenol as needed, diclofenac 50 mg twice daily.    Past medical history is reviewed and is  unchanged in the interim.    Family history is reviewed and is unchanged in the interim.    Review of Systems:  Positive for numbness/tingling.  Negative for loss of bowel/bladder control, footdrop, weakness, headache, dizziness, nausea/vomiting, blurry vision, balance changes.     Objective:   CONSTITUTIONAL:  Vital signs as above.  No acute distress.  The patient is well nourished and well groomed.    PSYCHIATRIC:  The patient is awake, alert, oriented to person, place and time.  The patient is answering questions appropriately with clear speech.  Normal affect.  HEENT: Normocephalic, atraumatic.  Sclera clear.    SKIN:  Skin over the face, posterior torso, bilateral upper and lower extremities is clean, dry, intact without rashes.  MUSCULOSKELETAL:  Gait is mildly antalgic, favoring the right.  He does use a cane for assistance.  Mild tenderness palpation of the right greater trochanter.  The patient had increased right groin pain with internal rotation of the right hip.  Increase right lateral hip pain with external rotation of the right hip.  The patient has 5/5 strength for the bilateral hip flexors, knee flexors/extensors, ankle dorsiflexors/plantar flexors, ankle evertors/invertors.    NEUROLOGICAL: Subjective diminished sensation in the right L5 dermatome.     RESULTS:  MRI lumbar spine from [facility] dated October 7, 2070 was reviewed.  This shows 8 mm of degenerative anterolisthesis of L4 on L5.  This results in moderate to severe spinal canal stenosis.  The patient also has moderate to severe spinal canal stenosis due to degenerative changes superimposed on developmental spinal canal stenosis.  At L4-5 there is moderate to severe bilateral facet arthropathy, severe right foraminal stenosis, and moderate to severe left foraminal stenosis.  Please see report for further details.
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
## 2021-06-13 NOTE — PROGRESS NOTES
[facility] Care Coordination Contact  [facility] Home Visit Assessment     Home visit for Health Risk Assessment with Mariusz Hebert completed on 11/12/2020.    Type of residence:: Private home - stairs  Current living arrangement:: I live in a private home with family     Assessment completed with: Patient    Current Care Plan  Member currently receiving the following home care services:   None  Member currently receiving the following community resources: Day Care, PCA    Phone assessment due to COVID-19    Medication Review  Medication reconciliation completed in Epic: IF NO, PLEASE EXPLAIN No face to face reconcile due to COVID-19  Medication set-up & administration: Family/informal caregiver sets up daily  Family caregiver administers medications  Medication Risk Assessment Medication (1 or more, place referral to MTM):  N/A: No risk factors identified  MTM Referral Placed: No: No risk factors idenified    Mental/Behavioral Health   Depression Screening:    PHQ-2 Total Score: 0       Mental health DX:: No        Falls Assessment:   Fallen 2 or more times in the past year?: No   Any fall with injury in the past year?: No    ADL/IADL Dependencies:   Dependent ADLs:: Ambulation-cane, Bathing, Dressing, Grooming, Transfers, Incontinence, Toileting  Dependent IADLs:: Cleaning, Cooking, Laundry, Shopping, Meal Preparation, Medication Management, Money Management, Transportation, Incontinence    [facility] benefits: Shared information re: Silver Sneakers/gym memberships, ASA, Calcium +D.    PCA Assessment completed at visit: Yes Annual PCA assessment indicated 21 units per day of PCA. This is the same as the previous assessment.     Elderly Waiver Eligibility: Yes - will continue on EW    Care Plan & Recommendations:     Mariusz lives with his spouse and children.  Mariusz remains eligible for Elderly Waiver and will continue to receive PCA with no changes.  Mariusz also attends Adult Day Care at [facility] 2 days x week.   Mariusz continues to use incontinence supplies and has asked for an additional pack.  Care Coordinator emailed request to [facility].  Mariusz has no other needs a this time.      See LT for detailed assessment information.    Follow-Up Plan: Member informed of future contact, plan to f/u with member with a 6 month telephone assessment.  Contact information shared with member and family, encouraged member to call with any questions or concerns at any time.    [facility] care continuum providers: Please refer to Health Care Home on the Epic Problem List to view this patient's [facility] Care Plan Summary.    JANNETH Gonzalez  [facility]  Phone: [phone]

## 2021-06-13 NOTE — PROGRESS NOTES
[facility] Care Coordination Contact    Received after visit chart from care coordinator.  Completed following tasks: Mailed copy of care plan to client, Updated services in access and Submitted referrals/auths for ADC.     Provider Signature - No POC Shared:  Member indicates that they do not want their POC shared with any EW providers.    Signature pages received and filed.  Sent PCA Signature page to provider and HP.    Aviva Barton  Care Management Specialist  [facility]  [phone]

## 2021-06-14 NOTE — PROGRESS NOTES
"[facility] Daily Progress Note  Patient Name: Mariusz Hebert \"cho\"  Date of evaluation: 10/13/2017  Today's Date: 11/21/2017  Visit Number: 7 of 12 (per PT POC) non MedX  Referring provider: Guillermina Palmer PA-C  Referral Diagnosis: LBP  Visit Diagnosis:     ICD-10-CM    1. Lumbar radiculitis M54.16    2. Lumbar facet arthropathy M12.88    3. Chronic right hip pain M25.551     G89.29        Assessment:       Patient reports temporary relief from the TENS unit for about 2 hours and reports no questions or concerns with the home unit. PT to continue working on further strengthening in PT sessions.  PT added nustep and standing hip extension to home program. He has been doing the gastroc already at home but we reviewed the position.  Patient does go to adult group exercise weekly.     Patient's expectations/goals are: Realistic  Barriers to Learning or Achieving Goals: chronicity, language barrier    Goals:  Pt. will be independent with home exercise program in : 6 weeks  Pt will: walk for 15+ minutes with SEC daily with pain 4/10 or less for improved phycsical activity/endurance in 6 weeks  Pt will: report increasing activity around his home with pain 4/10 or less in 6 weeks       Plan:      Plan for next visit: Progress exercises as tolerated     Subjective:       Pain rating today: not rated d/t chronicity of pain.     States his lower back is more painful this morning d/t the weather.   Is working on exercises at home.   The TENs unit helps temporarily, uses it every day at home.      Objective:        Exercises: see flowsheet for date performed in clinic.  Exercise #1: supine SKTC reviewed  Comment #1: Supine SLR Slider reviewed  Exercise #2: supine LTR reviewed  Comment #2: standing BEST stretch HEP  Exercise #3: DKTC HEP  Comment #3: standing HS curl 10x bilat  Exercise #4: standing heel/toe raises  x 15/leg  Exercise #5: Standing Mini squats X 15  Comment #5: Step ups 4\" block X 10   Exercise #6: Standing hip " abduction X 15/leg  Comment #6: SKFO X 10   Exercise #7: Nustep x 5 minutes SD-5  Comment #7: Standing Gastroc hip flexor stretch 2x 30 seconds  Exercise #8: Standing Hip extension x 10/leg  Comment #8: standing trunk twists with dowel 5x bilat  Exercise #9: standing trunk flexion stretch 20sec x 4       Treatment Today   11/21/2017  TREATMENT MINUTES COMMENTS   Evaluation     Self-care/ Home management     Manual therapy     Neuromuscular Re-education     Therapeutic Activity     Therapeutic Exercises 23 See ex above   Gait training     Modality__________________                Total 23 Short session d/t patient late   Blank areas are intentional and mean the treatment did not include these items.        Rizwana Shepherd DPT  11/21/2017  9:10 AM

## 2021-06-14 NOTE — PROGRESS NOTES
"[facility] Daily Progress Note  Patient Name: Mariusz Hebert \"cho\"  Date of evaluation: 10/13/2017  Today's Date: 11/28/2017  Visit Number: 8 of 12 (per PT POC) non MedX  Referring provider: Guillermina Palmer PA-C  Referral Diagnosis: LBP  Visit Diagnosis:     ICD-10-CM    1. Lumbar radiculitis M54.16    2. Lumbar facet arthropathy M12.88    3. Chronic right hip pain M25.551     G89.29        Assessment:       Patient has been in PT for a few weeks now and has learned many exercises to keep up with at home. He is using a TENs unit 1x/day at home. He states he continues to have some pain in his back and his knee, but is able to do more exercises.     Patient's expectations/goals are: Realistic  Barriers to Learning or Achieving Goals: chronicity, language barrier    Goals:  Pt. will be independent with home exercise program: MET  Pt will: walk for 15+ minutes with SEC daily with pain 4/10 or less for improved phycsical activity/endurance: MET  Pt will: report increasing activity around his home with pain 4/10 or less: IMPROVED       Plan:      DC PT.     Subjective:       Pain rating today: not rated d/t chronicity of pain.   Feeling ok this morning.      Objective:        Exercises: see flowsheet for date performed in clinic.  Exercise #1: supine SKTC reviewed  Comment #1: Supine SLR Slider reviewed  Exercise #2: supine LTR reviewed  Comment #2: standing BEST stretch HEP  Exercise #3: DKTC HEP  Comment #3: standing HS curl 10x bilat  Exercise #4: standing heel/toe raises  x 15/leg  Exercise #5: Standing Mini squats X 15  Comment #5: Step ups 6\" block X 10   Exercise #6: Standing hip abduction X 15/leg  Comment #6: SKFO X 10   Exercise #7: Nustep x 5 minutes SD-5  Comment #7: Standing Gastroc hip flexor stretch 2x 30 seconds  Exercise #8: Standing Hip extension x 10/leg  Comment #8: standing trunk twists with dowel 5x bilat  Exercise #9: standing trunk flexion stretch 20sec x 4  Comment #9: side stepping   Exercise " #10: standing balance: NBOS, WBOS staggered stance, SLS on hard surface and on foam. with head turns  Comment #10: step up/down on airex     Treatment Today   11/28/2017  TREATMENT MINUTES COMMENTS   Evaluation     Self-care/ Home management     Manual therapy     Neuromuscular Re-education     Therapeutic Activity     Therapeutic Exercises 25 See ex above   Gait training     Modality__________________                Total 25    Blank areas are intentional and mean the treatment did not include these items.        Rizwana Shepherd DPT  11/28/2017  9:10 AM

## 2021-06-14 NOTE — PATIENT INSTRUCTIONS - HE
Patient Education   Personalized Prevention Plan  You are due for the preventive services outlined below.  Your care team is available to assist you in scheduling these services.  If you have already completed any of these items, please share that information with your care team to update in your medical record.  Health Maintenance   Topic Date Due     HEPATITIS C SCREENING  1950     ZOSTER VACCINES (1 of 2) 02/07/2001     FALL RISK ASSESSMENT  04/16/2021     MEDICARE ANNUAL WELLNESS VISIT  01/13/2022     TD 18+ HE  06/08/2022     COLORECTAL CANCER SCREENING  06/20/2022     ADVANCE CARE PLANNING  07/17/2024     LIPID  09/23/2024     Pneumococcal Vaccine: 65+ Years  Completed     INFLUENZA VACCINE RULE BASED  Completed     Pneumococcal Vaccine: Pediatrics (0 to 5 Years) and At-Risk Patients (6 to 64 Years)  Aged Out     HEPATITIS B VACCINES  Aged Out

## 2021-06-14 NOTE — TELEPHONE ENCOUNTER
Refill Approved    Rx renewed per Medication Renewal Policy. Medication was last renewed on 12/19/19.    Chantale Ferro, [facility] Triage/Med Refill 12/22/2020     Requested Prescriptions   Pending Prescriptions Disp Refills     simvastatin (ZOCOR) 20 MG tablet [Pharmacy Med Name: SIMVASTATIN 20 MG TABLET 20 Tablet] 30 tablet 11     Sig: TAKE 1 TABLET (20 MG TOTAL) BY MOUTH EVERY EVENING.       Statins Refill Protocol (Hmg CoA Reductase Inhibitors) Passed - 12/20/2020 10:37 AM        Passed - PCP or prescribing provider visit in past 12 months      Last office visit with prescriber/PCP: 11/17/2020 Shree Dyson MD OR same dept: 11/17/2020 Shree Dyson MD OR same specialty: 11/17/2020 Shree Dyson MD  Last physical: 3/12/2019 Last MTM visit: Visit date not found   Next visit within 3 mo: Visit date not found  Next physical within 3 mo: Visit date not found  Prescriber OR PCP: Shree Dyson MD  Last diagnosis associated with med order: 1. Pure hypercholesterolemia  - simvastatin (ZOCOR) 20 MG tablet [Pharmacy Med Name: SIMVASTATIN 20 MG TABLET 20 Tablet]; Take 1 tablet (20 mg total) by mouth every evening.  Dispense: 30 tablet; Refill: 11    2. Primary osteoarthritis of right knee  - DULoxetine (CYMBALTA) 60 MG capsule [Pharmacy Med Name: DULOXETINE HCL 60 MG CPEP 60 Capsule]; Take 1 capsule (60 mg total) by mouth daily.  Dispense: 30 capsule; Refill: 9    3. Lumbar Canal Stenosis  - DULoxetine (CYMBALTA) 60 MG capsule [Pharmacy Med Name: DULOXETINE HCL 60 MG CPEP 60 Capsule]; Take 1 capsule (60 mg total) by mouth daily.  Dispense: 30 capsule; Refill: 9    If protocol passes may refill for 12 months if within 3 months of last provider visit (or a total of 15 months).                DULoxetine (CYMBALTA) 60 MG capsule [Pharmacy Med Name: DULOXETINE HCL 60 MG CPEP 60 Capsule] 30 capsule 9     Sig: TAKE 1 CAPSULE (60 MG TOTAL) BY MOUTH DAILY.       Tricyclics/Misc Antidepressant/Antianxiety Meds Refill Protocol Passed  - 12/20/2020 10:37 AM        Passed - PCP or prescribing provider visit in last year     Last office visit with prescriber/PCP: 11/17/2020 Shree Dyson MD OR same dept: 11/17/2020 Shree Dyson MD OR same specialty: 11/17/2020 Shree Dyson MD  Last physical: 3/12/2019 Last MTM visit: Visit date not found   Next visit within 3 mo: Visit date not found  Next physical within 3 mo: Visit date not found  Prescriber OR PCP: Shree Dyson MD  Last diagnosis associated with med order: 1. Pure hypercholesterolemia  - simvastatin (ZOCOR) 20 MG tablet [Pharmacy Med Name: SIMVASTATIN 20 MG TABLET 20 Tablet]; Take 1 tablet (20 mg total) by mouth every evening.  Dispense: 30 tablet; Refill: 11    2. Primary osteoarthritis of right knee  - DULoxetine (CYMBALTA) 60 MG capsule [Pharmacy Med Name: DULOXETINE HCL 60 MG CPEP 60 Capsule]; Take 1 capsule (60 mg total) by mouth daily.  Dispense: 30 capsule; Refill: 9    3. Lumbar Canal Stenosis  - DULoxetine (CYMBALTA) 60 MG capsule [Pharmacy Med Name: DULOXETINE HCL 60 MG CPEP 60 Capsule]; Take 1 capsule (60 mg total) by mouth daily.  Dispense: 30 capsule; Refill: 9    If protocol passes may refill for 12 months if within 3 months of last provider visit (or a total of 15 months).

## 2021-06-14 NOTE — PROGRESS NOTES
Assessment and Plan:     Patient has been advised of split billing requirements and indicates understanding: Yes  1. Routine general medical examination at a health care facility    - Basic Metabolic Panel  - Lipid Cascade  - Hepatitis C Antibody (Anti-HCV)    2. Pure hypercholesterolemia      3. Neuritis of left lower extremity  Symptoms stable    4. Lumbar Canal Stenosis  controlled by gabapentin    5. Benign prostatic hyperplasia (BPH) with urinary urgency      6. Visual disturbance  Trouble seeing in the right eye   - Ambulatory referral to Optometry     The patient's current medical problems were reviewed.      The following health maintenance schedule was reviewed with the patient and provided in printed form in the after visit summary:   Health Maintenance   Topic Date Due     HEPATITIS C SCREENING  1950     ZOSTER VACCINES (1 of 2) 02/07/2001     FALL RISK ASSESSMENT  04/16/2021     MEDICARE ANNUAL WELLNESS VISIT  01/13/2022     TD 18+ HE  06/08/2022     COLORECTAL CANCER SCREENING  06/20/2022     ADVANCE CARE PLANNING  07/17/2024     LIPID  09/23/2024     Pneumococcal Vaccine: 65+ Years  Completed     INFLUENZA VACCINE RULE BASED  Completed     Pneumococcal Vaccine: Pediatrics (0 to 5 Years) and At-Risk Patients (6 to 64 Years)  Aged Out     HEPATITIS B VACCINES  Aged Out        Subjective:   Chief Complaint: Mariusz Hebert is an 70 y.o. male here for an Annual Wellness visit.   HPI:    Patient is accompanied by his daughter today's vitals medications in for review.  He reports that he has been having some problems seeing out of his right eye he last saw an eye doctor approximately a year ago but does not remember what he told him specifically.  I do not have any notes from that visit.  He has been taking all his medications faithfully.  He is fasting today.  Review of Systems:    Please see above.  The rest of the review of systems are negative for all systems.    Patient Care Team:  Shree Dyson MD as  PCP - Shree Teague MD as Assigned PCP  Savannah Desai SW as Lead Care Coordinator (Primary Care - CC)     Patient Active Problem List   Diagnosis     Diverticulosis     Benign prostatic hyperplasia (BPH) with urinary urgency     Drip Or Drainage Down Throat From Above     Constipation     Cervicalgia     Urinary Incontinence     Atopic Dermatitis     Lumbar Canal Stenosis     Neuritis of left lower extremity     Lower Back Pain     Pain of right lower extremity     Meniscus, lateral, posterior horn derangement, right     Osteoarthritis of right knee     Chondromalacia of knee, right     Pure hypercholesterolemia     Sinusitis, unspecified chronicity, unspecified location     Nausea and vomiting, intractability of vomiting not specified, unspecified vomiting type     Past Medical History:   Diagnosis Date     Constipation     Created by Conversion      Lower Back Pain     Created by Conversion       Past Surgical History:   Procedure Laterality Date     VT CORTES W/O FACETEC FORAMOT/[facility]KC 1/2 VRT SEG, CERVICAL      Description: Laminectomy Lumbar;  Recorded: 03/31/2011;  Comments: L4 and L5     VT LAP,INGUINAL HERNIA REPR,INITIAL      Description: Laparoscopy Repair Of Initial Inguinal Hernia;  Proc Date: 10/01/2009;  Comments: Dr Arreola      No family history on file.   Social History     Socioeconomic History     Marital status:      Spouse name: Not on file     Number of children: Not on file     Years of education: Not on file     Highest education level: Not on file   Occupational History     Not on file   Social Needs     Financial resource strain: Not on file     Food insecurity     Worry: Not on file     Inability: Not on file     Transportation needs     Medical: Not on file     Non-medical: Not on file   Tobacco Use     Smoking status: Never Smoker     Smokeless tobacco: Current User     Types: Chew     Tobacco comment: chews betel nut W/ TOBACCO   Substance and Sexual Activity     Alcohol  use: No     Frequency: Never     Drug use: No     Sexual activity: Not Currently     Partners: Female   Lifestyle     Physical activity     Days per week: Not on file     Minutes per session: Not on file     Stress: Not on file   Relationships     Social connections     Talks on phone: Not on file     Gets together: Not on file     Attends [employer] service: Not on file     Active member of club or organization: Not on file     Attends meetings of clubs or organizations: Not on file     Relationship status: Not on file     Intimate partner violence     Fear of current or ex partner: Not on file     Emotionally abused: Not on file     Physically abused: Not on file     Forced sexual activity: Not on file   Other Topics Concern     Not on file   Social History Narrative     Not on file      Current Outpatient Medications   Medication Sig Dispense Refill     cetirizine (ZYRTEC) 10 MG tablet TAKE ONE TABLET daily 90 tablet 1     DULoxetine (CYMBALTA) 60 MG capsule TAKE 1 CAPSULE (60 MG TOTAL) BY MOUTH DAILY. 90 capsule 3     gabapentin (NEURONTIN) 300 MG capsule TAKE 3 PILLS BY MOUTH 3 TIMES DAILY/ TXHUA HNUB NOJ 3 LUB 3 ZAUG 270 capsule 1     ketotifen (ZADITOR/ZYRTEC ITCHY EYES) 0.025 % (0.035 %) ophthalmic solution Place 1 drop in each eye daily 5 mL 5     meloxicam (MOBIC) 15 MG tablet   0     polyethylene glycol (GLYCOLAX) 17 gram/dose powder MIX 17 GRAMS IN 8 OZ WATER AND DRINK BY MOUTH DAILY 527 g 5     simvastatin (ZOCOR) 20 MG tablet TAKE 1 TABLET (20 MG TOTAL) BY MOUTH EVERY EVENING. 90 tablet 3     tamsulosin (FLOMAX) 0.4 mg cap TAKE 1 CAPSULE (0.4 MG TOTAL) BY MOUTH DAILY AFTER LUNCH. 90 capsule 3     acetaminophen (TYLENOL) 325 MG tablet Take 2 tablets (650 mg total) by mouth every 6 (six) hours as needed. 100 tablet 12     acetaminophen (TYLENOL) 325 MG tablet Take 2 tablets (650 mg total) by mouth every 6 (six) hours as needed for pain. 30 tablet 0     acetaminophen-codeine (TYLENOL #3) 300-30 mg per  "tablet        chlorhexidine (PERIDEX) 0.12 % solution        clindamycin (CLEOCIN) 300 MG capsule        ibuprofen (ADVIL,MOTRIN) 600 MG tablet        ondansetron (ZOFRAN ODT) 4 MG disintegrating tablet Take 1 tablet (4 mg total) by mouth every 8 (eight) hours as needed for nausea. 12 tablet 0     oxyCODONE-acetaminophen (PERCOCET/ENDOCET) 5-325 mg per tablet Take 1 tablet by mouth every 6 (six) hours as needed for pain. 12 tablet 0     VITAMIN D2 50,000 unit capsule TAKE 1 CAPSULE (50,000 UNITS TOTAL) BY MOUTH ONCE A WEEK. 4 capsule 3     No current facility-administered medications for this visit.       Objective:   Vital Signs: There were no vitals taken for this visit.       VisionScreening:  No exam data present     PHYSICAL EXAM  /76   Pulse 68   Temp 97.9  F (36.6  C) (Oral)   Resp 16   Ht 4' 11.5\" (1.511 m)   Wt 148 lb 4 oz (67.2 kg)   BMI 29.44 kg/m    General appearance: alert, appears stated age and cooperative  Head: Normocephalic, without obvious abnormality, atraumatic  Eyes: conjunctivae/corneas clear. PERRL, EOM's intact. Fundi benign.  Ears: normal TM's and external ear canals both ears  Nose: Nares normal. Septum midline. Mucosa normal. No drainage or sinus tenderness.  Throat: lips, mucosa, and tongue normal; teeth and gums normal  Neck: no adenopathy, no carotid bruit, no JVD, supple, symmetrical, trachea midline and thyroid not enlarged, symmetric, no tenderness/mass/nodules  Back: symmetric, no curvature. ROM normal. No CVA tenderness.  Lungs: clear to auscultation bilaterally  Chest wall: no tenderness  Heart: regular rate and rhythm, S1, S2 normal, no murmur, click, rub or gallop  Abdomen: soft, non-tender; bowel sounds normal; no masses,  no organomegaly  Male genitalia: normal  Rectal: normal tone, normal prostate, no masses or tenderness  Extremities: extremities normal, atraumatic, no cyanosis or edema  Pulses: 2+ and symmetric  Skin: Skin color, texture, turgor normal. No " rashes or lesions  Lymph nodes: Cervical, supraclavicular, and axillary nodes normal.  Neurologic: Grossly normal      No flowsheet data found.  A Mini-Cog score of 0-2 suggests the possibility of dementia, score of 3-5 suggests no dementia    Identified Health Risks:   Shree hansen md

## 2021-06-14 NOTE — PROGRESS NOTES
"[facility] Daily Progress Note  Patient Name: Mariusz Hebert \"cho\"  Date of evaluation: 10/13/2017  Today's Date: 11/16/2017  Visit Number: 6 of 12 (per PT POC) non MedX  Referring provider: Guillermina Palmer PA-C  Referral Diagnosis: LBP  Visit Diagnosis:     ICD-10-CM    1. Lumbar radiculitis M54.16    2. Lumbar facet arthropathy M12.88    3. Chronic right hip pain M25.551     G89.29        Assessment:       Patient reports temporary relief from the TENS unit for about 2 hours and reports no questions or concerns with the home unit. PT to continue working on further strengthening in PT sessions.  PT added nustep and standing hip extension to home program. He has been doing the gastroc already at home but we reviewed the position.  Patient does go to adult group exercise weekly.     Patient's expectations/goals are: Realistic  Barriers to Learning or Achieving Goals: chronicity, language barrier    Goals:  Pt. will be independent with home exercise program in : 6 weeks  Pt will: walk for 15+ minutes with SEC daily with pain 4/10 or less for improved phycsical activity/endurance in 6 weeks  Pt will: report increasing activity around his home with pain 4/10 or less in 6 weeks       Plan:      Plan for next visit: Progress exercises as tolerated     Subjective:       Pain rating today: not rated d/t chronicity of pain.     Patient is complaining oh HA this visit. He take tylenol to manage.  He is reporting his hip to be bad today due to the weather. He is noting 2 hours of relief from the TENS unit.       Objective:       Focused on Home TENs unit use during session today. Educated patient on use/care/safety of home TENs and patient practiced applying electrodes and using it himself. He demonstrates and verbalizes understanding of use.     Hip ROM  Date: 10/27/17      PROM in degrees PROM in degrees PROM in degrees    Right Left Right Left Right Left   Hip Flexion (0-120 ) 95        Hip Abduction (0-45 )         Hip " "External Rotation (0-50 ) 45        Hip Internal Rotation (0-40 ) 15        Hip Extension (0-15 )           Exercises: see flowsheet for date performed in clinic.  Exercise #1: supine SKTC reviewed  Comment #1: Supine SLR Slider reviewed  Exercise #2: supine LTR reviewed  Comment #2: standing BEST stretch HEP  Exercise #3: DKTC HEP  Comment #3: standing HS curl (significantly reduced ROm on Right 10x bilat  Exercise #4: standing heel/toe raises  x 15/leg  Exercise #5: Standing Mini squats X 12  Comment #5: Step ups 4\" block X 10   Exercise #6: Standing hip abduction X 15/leg  Comment #6: SKFO X 10   Exercise #7: Nustep x 6 minutes SD-5  Comment #7: Standing Gastroc hip flexor stretch 2x 30 seconds  Exercise #8: Standing Hip extension x 10/leg       Treatment Today   11/16/2017  TREATMENT MINUTES COMMENTS   Evaluation     Self-care/ Home management     Manual therapy     Neuromuscular Re-education     Therapeutic Activity     Therapeutic Exercises     Gait training     Modality__________________ 25 TENs set up using his own TENs unit from home. See above              Total 25    Blank areas are intentional and mean the treatment did not include these items.        Gabriella Calderon DPT  11/16/2017  9:10 AM               "

## 2021-06-14 NOTE — PROGRESS NOTES
Assessment:   Mariusz Hebert is a 67 y.o. y.o. male with past medical history significant for BPH, hypercholesterolemia who presents today for follow-up regarding chronic right low back pain with radiation into the right lower extremity with associated numbness and tingling.  Patient has a history of a decompressive lumbar laminectomy 2011.  MRI lumbar spine shows degenerative spondylolisthesis at L4-5 which results in moderate to severe spinal canal stenosis as well as severe right foraminal stenosis.  The patient's pain and sensory deficit follows the right L5 dermatome.  The patient status post a right L4-5 transforaminal epidural steroid injection on October 12, 2017 provided 55% relief of his pain, but I suspect this may be wearing off as the patient's pain has increased over the past 4 weeks.       Plan:     A shared decision making plan was used.  The patient's values and choices were respected.  The following represents what was discussed and decided upon by the physician assistant and the patient.  A professional  is present for the visit.    1.  DIAGNOSTIC TESTS: I reviewed the MRI lumbar spine.  No further diagnostic tests were ordered.    2.  PHYSICAL THERAPY: The patient is currently in physical therapy.  He has had 6 sessions.  I encouraged him to continue doing his home exercises.    3.  MEDICATIONS: No changes are made to the patient's medications.  Uses gabapentin 600 mg 3 times daily, diclofenac once per day, and Tylenol as needed.    4.  INTERVENTIONS: I offered the patient a right L4-5, L5-S1 transforaminal epidural steroid injection.  I chose this because a right L4-5 transforaminal epidural steroid injection did provide 55% relief of his pain and he feels that relief is likely waning.  However, I would like to include the right L5-S1 level as the patient's pain does most closely follow the L5 distribution and I do think he is trapping the L5 nerve root in the lateral recess and L4-5.   Hopefully by injecting both levels at the same time, he can have significant relief of his pain.  The patient declined my offer for an injection.  He indicated he would like to complete his physical therapy before considering additional injections.    5.  PATIENT EDUCATION:   -I did tell the patient that given his spondylolisthesis and significant stenosis may be difficult to reach 100% relief of his pain without surgical intervention.  The patient indicated he is not interested in pursuing any additional back surgery.  -Patient is in agreement with the above plan.  All questions were answered.    6.  FOLLOW-UP: I will see the patient back in clinic in 4 weeks to follow-up.  If he has any questions or concerns, he should not hesitate to contact our clinic.    Subjective:     Mariusz Hebert is a 67 y.o. male who presents today for follow-up regarding chronic right low back pain with radiation into the right lower extremity.  I last saw the patient on October 26, 2017.  At that time he is following up after a right L4-5 transforaminal epidural steroid injection which was performed on October 12, 2017.  At that visit the patient had reported 55% relief of his pain.  He states that since that visit his pain is gotten worse and he wonders if that injection may have worn off.  He has had 4 additional sessions of physical therapy since he was last seen.  He does not feel like he is making much progress with physical therapy.    The patient continues to complain of right-sided low back pain.  Pain radiates into the right buttock, down the lateral thigh, and into the anterior shin.  He has numbness and tingling in the same distribution as his pain, most pronounced distal to the knee.  He rates his pain today as a 4 out of 10.  At its best it is a 4-10.  At its worst it is an 8 out of 10.  Patient's pain tends to be aggravated by cold weather.  His pain is alleviated with rest.  He denies any new symptoms since he was last seen.   He denies any weakness in the legs.    The patient is currently in physical therapy.  He has had 7 sessions.  He is doing his home exercises.  He is using gabapentin 600 mg 3 times daily, diclofenac once per day, and Tylenol as needed.  He states he takes the Tylenol mostly for headache.    Past medical history is reviewed and is unchanged in the interim.    Family history is reviewed and is unchanged in the interim.    Review of Systems:  Positive for numbness/tingling, headache, dizziness, blurry vision.  Negative for loss of bowel/bladder control, footdrop, weakness, nausea/vomiting, balance changes.     Objective:   CONSTITUTIONAL:  Vital signs as above.  No acute distress.  The patient is well nourished and well groomed.    PSYCHIATRIC:  The patient is awake, alert, oriented to person, place and time.  The patient is answering questions appropriately with clear speech.  Normal affect.  HEENT: Normocephalic, atraumatic.  Sclera clear.    SKIN:  Skin over the face, posterior torso, bilateral upper and lower extremities is clean, dry, intact without rashes.  MUSCULOSKELETAL: Patient ambulates with a cane for assistance.    The patient has 5/5 strength for the bilateral hip flexors, knee flexors/extensors, ankle dorsiflexors/plantar flexors, ankle evertors/invertors.    NEUROLOGICAL:   Subjective diminished sensation in the right L5 dermatome.     RESULTS:  MRI lumbar spine from [facility] dated October 7, 2070 was reviewed.  This shows 8 mm of degenerative anterolisthesis of L4 on L5.  This results in moderate to severe spinal canal stenosis.  The patient also has moderate to severe spinal canal stenosis due to degenerative changes superimposed on developmental spinal canal stenosis.  At L4-5 there is moderate to severe bilateral facet arthropathy, severe right foraminal stenosis, and moderate to severe left foraminal stenosis.  Please see report for further details.

## 2021-06-14 NOTE — TELEPHONE ENCOUNTER
Refill Approved    Rx renewed per Medication Renewal Policy. Medication was last renewed on 11/17/20.    Chantale Ferro, [facility] Triage/Med Refill 1/28/2021     Requested Prescriptions   Pending Prescriptions Disp Refills     gabapentin (NEURONTIN) 300 MG capsule [Pharmacy Med Name: GABAPENTIN 300 MG CAPSULE 300 Capsule] 270 capsule 1     Sig: TAKE 3 PILLS BY MOUTH 3 TIMES DAILY/ TXHUA HNUB NOJ 3 LUB 3 ZAUG       Gabapentin/Levetiracetam/Tiagabine Refill Protocol  Passed - 1/27/2021  8:08 AM        Passed - PCP or prescribing provider visit in past 12 months or next 3 months     Last office visit with prescriber/PCP: 11/17/2020 Shree Dyson MD OR same dept: 11/17/2020 Shree Dyson MD OR same specialty: 11/17/2020 Shree Dyson MD  Last physical: 1/13/2021 Last MTM visit: Visit date not found   Next visit within 3 mo: Visit date not found  Next physical within 3 mo: Visit date not found  Prescriber OR PCP: Shree Dyson MD  Last diagnosis associated with med order: 1. Neuritis of left lower extremity  - gabapentin (NEURONTIN) 300 MG capsule [Pharmacy Med Name: GABAPENTIN 300 MG CAPSULE 300 Capsule]; TAKE 3 PILLS BY MOUTH 3 TIMES DAILY/ TXHUA HNUB NOJ 3 LUB 3 ZAUG  Dispense: 270 capsule; Refill: 1    If protocol passes may refill for 12 months if within 3 months of last provider visit (or a total of 15 months).

## 2021-06-15 PROBLEM — E78.00 PURE HYPERCHOLESTEROLEMIA: Status: ACTIVE | Noted: 2017-01-31

## 2021-06-15 NOTE — PROGRESS NOTES
ASSESSMENT AND PLAN:  1. Benign prostatic hyperplasia (BPH) with urinary urgency symptoms are well controlled taking medication no side effects noted continue follow-up as needed    2. Neuritis of left lower extremity currently in gabapentin this is been a chronic medication he has minimal side effects which include constipation is happy with his pain control he has been seeing the spine clinic repeatedly he has been doing his physical therapy at home    3. Pure hypercholesterolemia     4. Lumbago is been managing his pain at present he does not want any therapeutic injections.  For pain he has been taking gabapentin duloxetine and diclofenac        CHIEF COMPLAINT:  Chief Complaint   Patient presents with     Follow-up     back pain     Medication Refill     eye drops       HISTORY OF PRESENT ILLNESS:  Mariusz is a 67 y.o. male presenting for follow-up. Mariusz is present with a Nettie .     He has been seen by [facility] several times with the last visit occurring yesterday. During that visit, right L4-5, L5-S1 transforaminal epidural steroid injection was offered. The patient declined and stated he feels that his pain is manageable at this point. The prospect of surgery was also discussed but he voiced that he is not interested in surgery at this time. Currently, he notes that activity will worsen his back pain with radiation to his right leg. He is still able to do his activities as he want.     Urinary Concerns: He is taking his Flomax and oxybutynin faithfully. He has been able to urinary regularly.     REVIEW OF SYSTEMS:   He states that he is sleeping well and goes to sleep at 9 PM.    All other 10 point review of systems are negative.    PFSH:  . Pertinent past, family, social and medical history reviewed.     TOBACCO USE:  History   Smoking Status     Never Smoker   Smokeless Tobacco     Current User     Types: Chew     Comment: chews betel nut       VITALS:  Vitals:    02/06/18 0821   BP: 126/70  PA for [employer] completed and faxed to pharmacy. Awaiting decision      Medication/Dosage/Frequency: D-Amphetamine 10mg tab. Take one tab by mouth twice  daily    Diagnosis and ICD-10 code:  F90.0 ADHD    Patient has been on medication in the past.             Patient Site: Left Arm   Patient Position: Sitting   Cuff Size: Adult Regular   Pulse: 65   Temp: 97.4  F (36.3  C)   TempSrc: Oral   SpO2: 97%   Weight: 146 lb 5 oz (66.4 kg)   Height: 5' (1.524 m)     Wt Readings from Last 3 Encounters:   02/06/18 146 lb 5 oz (66.4 kg)   02/05/18 146 lb (66.2 kg)   12/22/17 139 lb (63 kg)     Body mass index is 28.57 kg/(m^2).    PHYSICAL EXAM:  General: Alert, cooperative, no distress, appears stated age  Head: Normocephalic, without obvious abnormality, atraumatic  Eyes: PERRL, conjunctiva/cornea clear, EOM's intact, fundi benign, both eyes  Ears: Normal TM's and external ear canals, both ears  Nose: Nares normal, septum midline, mucosa normal, no drainage or sinus tenderness  Throat: Lips, mucosa, and tongue normal; teeth and gums normal  Musculoskeletal: Tenderness over right SI.   Neurologic: No tremor, no focal findings.     Psych: Oriented x3. Affect normal.     DATA REVIEWED:  Additional History from Old Records Summarized (2): Reviewed Guillermina Palmer PA-C note from 2/5/2018 and 12/22/2017 regarding back pain.   Decision to Obtain Records (1): None  Radiology Tests Summarized or Ordered (1): None  Labs Reviewed or Ordered (1): None  Medicine Test Summarized or Ordered (1): None  Independent Review of EKG or X-RAY(2 each): None    The visit lasted a total of 12 minutes face to face with the patient. Over 50% of the time was spent counseling and educating the patient about back pain.     Nano LOCO, am scribing for and in the presence of, Dr. Dyson.    charlotte LOCO personally performed the services described in this documentation, as scribed by Nano Garcia in my presence, and it is both accurate and complete.      MEDICATIONS:  Current Outpatient Prescriptions   Medication Sig Dispense Refill     acetaminophen (TYLENOL) 325 MG tablet Take 2 tablets (650 mg total) by mouth every 6 (six) hours as needed. 100 tablet 12     bromfenac (BROMDAY) 0.09 % ophthalmic  solution Apply 2 drops each daily 5 mL 5     cetirizine (ZYRTEC) 10 MG tablet TAKE 1 PILL BY MOUTH EVERY DAY FOR ALLERGIES 90 tablet 2     diclofenac (VOLTAREN) 50 MG EC tablet Take 1 tablet (50 mg total) by mouth 2 (two) times a day. 60 tablet 11     DULoxetine (CYMBALTA) 60 MG capsule   9     gabapentin (NEURONTIN) 300 MG capsule TAKE 2 CAPSULES (600 MG TOTAL) BY MOUTH 3 (THREE) TIMES A DAY. 540 capsule 3     ketorolac (ACULAR LS) 0.4 % Drop APPLY 2 DROPS IN EACH EYE DAILY AS NEEDED 5 mL 1     naproxen (NAPROSYN) 500 MG tablet TAKE 1 PILL BY MOUTH TWO TIMES A DAY WITH MEALS 60 tablet 2     oxybutynin (DITROPAN XL) 5 MG ER tablet TAKE 1 TABLET (5 MG TOTAL) BY MOUTH DAILY. 90 tablet 3     oxyCODONE-acetaminophen (PERCOCET) 5-325 mg per tablet Take 1 tablet by mouth every 6 (six) hours as needed for pain.       polyethylene glycol (GLYCOLAX) 17 gram/dose powder MIX 17 GRAMS IN 8 OZ WATER AND DRINK BY MOUTH DAILY 527 g 5     polyvinyl alcohol (LIQUIFILM TEARS) 1.4 % ophthalmic solution 1 drop as needed for dry eyes.       sodium chloride (OCEAN) 0.65 % nasal spray 1 spray into each nostril as needed for congestion.       tamsulosin (FLOMAX) 0.4 mg Cp24 TAKE 1 CAPSULE (0.4 MG TOTAL) BY MOUTH DAILY AFTER LUNCH. 30 capsule 11     traZODone (DESYREL) 50 MG tablet TAKE 1 PILL (50 MG TOTAL) BY MOUTH BEDTIME FOR SLEEP 30 tablet 11     VITAMIN D2 50,000 unit capsule TAKE 1 CAPSULE (50,000 UNITS TOTAL) BY MOUTH ONCE A WEEK. 4 capsule 3     No current facility-administered medications for this visit.        Total Data Points: 2

## 2021-06-15 NOTE — PROGRESS NOTES
Assessment:   Mariusz Hebert is a 67 y.o. y.o. male with past medical history significant for BPH, hypercholesterolemia who presents today for follow-up regarding chronic right low back pain with radiation into the right lower extremity with associated numbness and tingling.  Patient has a history of a decompressive lumbar laminectomy in 2011.  MRI lumbar spine shows a degenerative spondylolisthesis at L4-5 which results in moderate to severe spinal canal stenosis as well as severe right foraminal stenosis.  The patient has a sensory deficit on the right L5 dermatome.  The patient status post a right L4-5 transforaminal epidural steroid injection on October 12, 2070 which provided 55% relief of his pain, but that relief is waning.  The patient's strength on exam today was intact.       Plan:     A shared decision making plan was used.  The patient's values and choices were respected.  The following represents what was discussed and decided upon by the physician assistant and the patient.  A professional  is present for the visit.  Patient completed 8 sessions of physical therapy.    1.  DIAGNOSTIC TESTS: I reviewed the MRI lumbar spine.  No further diagnostic tests were ordered.    2.  PHYSICAL THERAPY: I encouraged him to continue doing his home exercises on a daily basis.    3.  MEDICATIONS: No changes are made to patient's medications.  He uses gabapentin 600 mg 3 times daily, diclofenac 50 mg twice daily, and Tylenol as needed.    4.  INTERVENTIONS: I offered the patient a right L4-5, L5-S1 transforaminal epidural steroid injection.  I expanded this to be different than his previous right L4-5 transforaminal epidural steroid injection.  Hopefully would provide more significant relief of his pain.  The patient declined.  He feels that his pain is manageable at this point.  If his pain were to worsen again, he would like to try this injection.    5.  PATIENT EDUCATION: I again discussed with the patient that  I  he may require surgery for definitive treatment for his problem.  The patient voiced that he is not interested in considering surgery.  I did tell the patient that if he would like to speak with a surgeon at any time, I be happy to refer him.    6.  FOLLOW-UP: I offered to follow back up with the patient in 3 months.  He declined.  He prefers to follow-up as needed.  If he has any questions or concerns, he should not hesitate to call.    Subjective:     Mariusz Hebert is a 67 y.o. male who presents today for follow-up regarding chronic right low back pain with radiation into the right lower extremity with associated numbness and tingling.  I last saw the patient on December 22, 2017.  At that time I recommended that he continue with his home exercise program.  The patient denies any change in his pain since he was last seen.    The patient continues to complain of right-sided low back pain.  The pain radiates into the right buttock, down the lateral thigh, and into the anterior lateral shin.  He has numbness and tingling in the same distribution as his pain, most pronounced distal to the knee and the anterolateral shin.  The patient rates his pain today as a 6 out of 10.  At its best it is a 5-10.  At its worst it is a 9 out of 10.  The patient's pain is aggravated with walking in cold weather.  His pain is alleviated with taking pain relieving medications.  The patient does feel that his strength in his right ankle is normal.  He states that this feels improved over the past few weeks.    The patient completed 8 sessions of physical therapy.  He is doing his home exercises.  He uses gabapentin 600 mg 3 times daily, diclofenac 50 mg twice daily, and Tylenol as needed.    Past medical history is reviewed and is unchanged in the interim.    Family history is reviewed and is unchanged in the interim.    Review of Systems:  Positive for headache, blurry vision.  Negative for numbness/tingling, loss of bowel/bladder  control, footdrop, weakness, dizziness, nausea/vomiting, balance changes.     Objective:   CONSTITUTIONAL:  Vital signs as above.  No acute distress.  The patient is well nourished and well groomed.    PSYCHIATRIC:  The patient is awake, alert, oriented to person, place and time.  The patient is answering questions appropriately with clear speech.  Normal affect.  HEENT: Normocephalic, atraumatic.  Sclera clear.    SKIN:  Skin over the face, posterior torso, bilateral upper and lower extremities is clean, dry, intact without rashes.  MUSCULOSKELETAL: Patient ambulate with a cane for assistance.  The patient has 5/5 strength for the bilateral hip flexors, knee flexors/extensors, ankle dorsiflexors/plantar flexors, ankle evertors/invertors.    NEUROLOGICAL: Subjective diminished/altered sensation in the right L5 dermatome.     RESULTS:  MRI lumbar spine from [facility] dated October 7, 2017 was reviewed.  This shows 8 mm of degenerative anterolisthesis of L4 on L5.  This results in moderate to severe spinal canal stenosis.  The patient also has moderate to severe spinal canal stenosis due to degenerative changes superimposed on developmental spinal canal stenosis.  At L4-5 there is moderate to severe bilateral facet arthropathy, severe right foraminal stenosis, and moderate to severe left foraminal stenosis.  Please see report for further details.

## 2021-06-16 ENCOUNTER — COMMUNICATION - HEALTHEAST (OUTPATIENT)
Dept: GERIATRIC MEDICINE | Facility: CLINIC | Age: 71
End: 2021-06-16

## 2021-06-16 PROBLEM — R11.2 NAUSEA AND VOMITING, INTRACTABILITY OF VOMITING NOT SPECIFIED, UNSPECIFIED VOMITING TYPE: Status: ACTIVE | Noted: 2019-09-17

## 2021-06-16 PROBLEM — J32.9 SINUSITIS, UNSPECIFIED CHRONICITY, UNSPECIFIED LOCATION: Status: ACTIVE | Noted: 2019-09-17

## 2021-06-16 NOTE — TELEPHONE ENCOUNTER
Telephone Encounter by Faith Archer at 3/15/2019  3:19 PM     Author: Faith Archer Service: -- Author Type: --    Filed: 3/15/2019  3:20 PM Encounter Date: 3/13/2019 Status: Signed    : Faith Archer       PRIOR AUTHORIZATION DENIED    Denial Rational:  Must try/fail flurbiprofen or diclofenac ophthalmic solution          Appeal Information:  This medication was denied. If physician would like to appeal because patient has contraindication or allergy to covered medication please write letter of medical necessity and route back to PA team to initiate.  If no further action is needed please close encounter thank you.

## 2021-06-16 NOTE — TELEPHONE ENCOUNTER
Telephone Encounter by Yemi Thompson CMA at 6/13/2019 11:35 AM     Author: Yemi Thompson CMA Service: -- Author Type: Certified Medical Assistant    Filed: 6/13/2019 11:40 AM Encounter Date: 6/12/2019 Status: Signed    : Yemi Thompson CMA (Certified Medical Assistant)       Shree Dyson MD  You 3 hours ago (8:34 AM)      The patient should be taking both iron 1 tablet twice per day, he is taking the gabapentin at the prescribed dose of 3 capsules 3 times a day thank you    Routing comment

## 2021-06-16 NOTE — PROGRESS NOTES
Assessment:   Mariusz Hebert is a 68 y.o. y.o. male with past medical history significant for BPH, hypercholesterolemia who presents today for follow-up regarding chronic right lower back pain with radiation into the right lower extremity with associated numbness and tingling and more recent onset of left lower extremity pain which I believe is radicular.  Patient has a history of a decompressive lumbar laminectomy in 2011.  MRI lumbar spine shows a degenerative spondylolisthesis at L4-5 which results in moderate to severe spinal canal stenosis as well as severe right foraminal stenosis and moderate to severe left foraminal stenosis.  The patient status post a right L4-5 transforaminal epidural steroid injection on October 12, 2017 which provided 55% relief of the patient's right-sided pain for approximately 4-5 months.  2 weeks ago, the patient felt his same right leg pain return.  He also had an increase in his left leg pain, which the patient states is very similar to his right leg pain.  -The patient also has known osteoarthritis in the knees.  He has had injections into his knees at [facility].  According to review of his chart, cortisone injections have only provided short-term relief of his pain.       Plan:     A shared decision making plan was used.  The patient's values and choices were respected.  The following represents what was discussed and decided upon by the physician assistant and the patient.  A professional  is present for the visit.    1.  DIAGNOSTIC TESTS: I reviewed the MRI lumbar spine.  No further diagnostic tests were ordered.  -I also reviewed the report of the MRI of the right knee.  I also reviewed records from [facility] regarding his knee pain.    2.  PHYSICAL THERAPY: The patient has had physical therapy for his lower back.  I encouraged him to continue doing his home exercises on a daily basis.    3.  MEDICATIONS: No changes are made to the patient's  medications.  He uses gabapentin 600 g 3 times daily, diclofenac 50 mrem twice daily, and Tylenol as needed.    4.  INTERVENTIONS:    -I offered the patient a bilateral L4-5 transforaminal epidural steroid injection, as the patient is not having bilateral symptoms.  The patient was satisfied with the amount of relief that he had after the right L4-5 transforaminal epidural steroid injection.  I explained how this would be different than his previous injection.  The patient indicated he would like to proceed and an order was placed.  -If this does not help, we could consider a bilateral L5-S1 transforaminal epidural steroid injection.  -The patient asked if I would also give him a knee injection.  I told the patient that I think it is most appropriate if his knee care is under the guidance of an orthopedic surgeon.  He has already tried and failed physical therapy and cortisone injections.     5.  PATIENT EDUCATION:    -I offered to refer the patient back to [facility].  He declined.  He would like to see how he does with his epidural steroid injections first.  -The patient is in agreement with the above plan.  All questions were answered.    6.  FOLLOW-UP: The patient return to the clinic for his bilateral L4-5 transforaminal epidural steroid injection.  If the patient has any questions or concerns in the meantime, he should not hesitate to contact our clinic.    Subjective:     Mariusz Hebert is a 68 y.o. male who presents today for follow-up regarding chronic right low back pain with radiation into the right lower extremity and worsening left lower extremity pain. I last saw the patient on February 5, 2018. At that time he reported that the relief that he experienced after his right L4-5 transforaminal epidural steroid injection in October 2017 was waning.  I offered a repeat injection, but the patient indicated he would like to wait. The patient states that about 2 weeks ago, his same pain became more  severe.  He is now interested in repeating his injection.  He denies any injury or event to cause the worsening pain.    The patient complains first of right-sided low back pain.  Pain radiates into the right buttock, down the lateral thigh, into the anterior shin, and into the dorsal foot.  He has numbness and tingling in the same distribution as his pain which is most pronounced in the foot.  The patient states that he also has chronic bilateral knee pain.  He has had injections in both knees in the past which have provided short-term relief of his pain.  The patient states that his left knee pain has been getting progressively more severe and over recent weeks he has also noticed increased pain radiating from the left buttock down the left leg.  Patient states that his left leg pain is very similar to his right-sided pain, so he thinks it is more related to his back than his knee, although he does not feel as much pain in the left low back.  Left-sided pain extends from the buttock, down the lateral thigh, and into the anterior shin.  He denies any numbness or tingling in the left leg.  He denies weakness in the legs.  The right leg is still more severely affected than the left.  The patient rates his pain today as a 7 out of 10.  At its best it is a 3 out of 10.  At its worst it is a 9 out of 10.  The patient's pain is aggravated with increased activity and alleviated with taking pain relieving medications.    The patient recently completed physical therapy.    He uses gabapentin 600 mg 3 times daily, diclofenac 50 mg twice daily, and Tylenol as needed.    Past medical history is reviewed and is unchanged in the interim.    Family history is reviewed and is unchanged in the interim.    Review of Systems:  Positive for numbness/tingling.  Negative for loss of bowel/bladder control, footdrop, weakness, headache, dizziness, nausea/vomiting, blurry vision, balance changes.     Objective:   CONSTITUTIONAL:  Vital  signs as above.  No acute distress.  The patient is well nourished and well groomed.    PSYCHIATRIC:  The patient is awake, alert, oriented to person, place and time.  The patient is answering questions appropriately with clear speech.  Normal affect.  HEENT: Normocephalic, atraumatic.  Sclera clear.    SKIN:  Skin over the face, posterior torso, bilateral upper and lower extremities is clean, dry, intact without rashes.  MUSCULOSKELETAL: Patient ambulates with a cane for assistance.  The patient has 5/5 strength for the bilateral hip flexors, knee flexors/extensors, ankle dorsiflexors/plantar flexors, ankle evertors/invertors.  The patient does have some tenderness to palpation over the joint line at the knee bilaterally.  NEUROLOGICAL: Subjective diminished/altered sensation in the right L5 dermatome.    RESULTS:  MRI lumbar spine from [facility] dated October 7, 2017 was reviewed.  This shows 8 mm of degenerative anterolisthesis of L4 on L5.  This results in moderate to severe spinal canal stenosis.  The patient also has moderate to severe spinal canal stenosis due to degenerative changes superimposed on developmental spinal canal stenosis.  At L4-5 there is moderate to severe bilateral facet arthropathy, severe right foraminal stenosis, and moderate to severe left foraminal stenosis.  Please see report for further details.

## 2021-06-17 NOTE — PROGRESS NOTES
ASSESSMENT AND PLAN:  1. Need for pneumococcal vaccination immunization given today Pneumococcal polysaccharide vaccine 23-valent 3 yo or older, subq/IM   2. Benign prostatic hyperplasia (BPH) with urinary urgency symptoms are stable continue current medication    3. Lumbar Canal Stenosis is able to exercise without any back pain    4. Right knee pain since she is received steroid injections in the pain is decreased    5. Chondromalacia of knee, right     6. Constipation currently not constipated taking stool softener and MiraLAX        CHIEF COMPLAINT:  Chief Complaint   Patient presents with     paperwork       HISTORY OF PRESENT ILLNESS:  Mariusz is a 68 y.o. male presenting for paperwork. Mariusz is present with a Nettie . He goes to [facility] twice a week for adult day care. He was given a health summary form to be filled out by a provider. He has not heard about cases of TB at the adult day care.     Back Pain: He is taking his medications faithfully. He received a lumbar steroid injection on 3/23/2018. He notes that his pain has improved with the injection.     REVIEW OF SYSTEMS:   He denies recent cough, night sweats and weight loss.    All other 10 point review of systems are negative.    PFSH:  . Pertinent past, family, social and medical history reviewed.     TOBACCO USE:  History   Smoking Status     Never Smoker   Smokeless Tobacco     Current User     Types: Chew     Comment: chews betel nut       VITALS:  Vitals:    03/28/18 1432   BP: 128/68   Patient Site: Right Arm   Patient Position: Sitting   Cuff Size: Adult Regular   Pulse: 81   Temp: 98  F (36.7  C)   TempSrc: Oral   SpO2: 94%   Weight: 147 lb 3.2 oz (66.8 kg)   Height: 5' (1.524 m)     Wt Readings from Last 3 Encounters:   03/28/18 147 lb 3.2 oz (66.8 kg)   03/20/18 146 lb (66.2 kg)   02/06/18 146 lb 5 oz (66.4 kg)     Body mass index is 28.75 kg/(m^2).    PHYSICAL EXAM:  General: Alert, cooperative, no distress, appears stated  age  Lungs: Clear to auscultation bilaterally, respirations unlabored  Chest wall: No tenderness or deformity  Heart: Regular rate and rhythm, S1 and S2 normal, no murmur, rub, or gallop  Neurologic: No tremor, no focal findings.     Psych: Oriented x3. Affect normal.     DATA REVIEWED:  Additional History from Old Records Summarized (2): Reviewed Procedure note from 3/23/2018 regarding steroid injection.   Decision to Obtain Records (1): None  Radiology Tests Summarized or Ordered (1): None  Labs Reviewed or Ordered (1): None  Medicine Test Summarized or Ordered (1): None  Independent Review of EKG or X-RAY(2 each): None    The visit lasted a total of 25 minutes face to face with the patient. Over 50% of the time was spent counseling and educating the patient about follow-up.  For conditions including lumbar canal stenosis right knee pain BPH and constipation.  Form was filled out for his adult  center.    INano, am scribing for and in the presence of, Dr. Dyson.    Icharlotte, personally performed the services described in this documentation, as scribed by Nano Garcia in my presence, and it is both accurate and complete.      MEDICATIONS:  Current Outpatient Prescriptions   Medication Sig Dispense Refill     acetaminophen (TYLENOL) 325 MG tablet Take 2 tablets (650 mg total) by mouth every 6 (six) hours as needed. 100 tablet 12     bromfenac (BROMDAY) 0.09 % ophthalmic solution Apply 2 drops each daily 5 mL 5     cetirizine (ZYRTEC) 10 MG tablet TAKE 1 PILL BY MOUTH EVERY DAY FOR ALLERGIES 90 tablet 2     diclofenac (VOLTAREN) 50 MG EC tablet Take 1 tablet (50 mg total) by mouth 2 (two) times a day. 60 tablet 11     DULoxetine (CYMBALTA) 60 MG capsule   9     gabapentin (NEURONTIN) 300 MG capsule TAKE 2 CAPSULES (600 MG TOTAL) BY MOUTH 3 (THREE) TIMES A DAY. 540 capsule 3     ketorolac (ACULAR LS) 0.4 % Drop APPLY 2 DROPS IN EACH EYE DAILY AS NEEDED 1 Bottle 1     naproxen  (NAPROSYN) 500 MG tablet TAKE 1 PILL BY MOUTH TWO TIMES A DAY WITH MEALS 60 tablet 2     oxybutynin (DITROPAN XL) 5 MG ER tablet TAKE 1 TABLET (5 MG TOTAL) BY MOUTH DAILY. 90 tablet 3     oxyCODONE-acetaminophen (PERCOCET) 5-325 mg per tablet Take 1 tablet by mouth every 6 (six) hours as needed for pain.       polyethylene glycol (GLYCOLAX) 17 gram/dose powder MIX 17 GRAMS IN 8 OZ WATER AND DRINK BY MOUTH DAILY 527 g 5     polyvinyl alcohol (LIQUIFILM TEARS) 1.4 % ophthalmic solution 1 drop as needed for dry eyes.       sodium chloride (OCEAN) 0.65 % nasal spray 1 spray into each nostril as needed for congestion.       tamsulosin (FLOMAX) 0.4 mg Cp24 TAKE 1 CAPSULE (0.4 MG TOTAL) BY MOUTH DAILY AFTER LUNCH. 30 capsule 11     traZODone (DESYREL) 50 MG tablet TAKE 1 PILL (50 MG TOTAL) BY MOUTH BEDTIME FOR SLEEP 30 tablet 11     VITAMIN D2 50,000 unit capsule TAKE 1 CAPSULE (50,000 UNITS TOTAL) BY MOUTH ONCE A WEEK. 4 capsule 3     No current facility-administered medications for this visit.        Total Data Points: 2

## 2021-06-17 NOTE — PROGRESS NOTES
Assessment:   Mariusz Hebert is a 68 y.o. y.o. male with past medical history significant for BPH, hypercholesterolemia who presents today for follow-up regarding chronic right low back pain with radiation into the right greater than left lower extremity with associated numbness and tingling.  Patient is a history of a decompressive lumbar laminectomy in 2011.  MRI lumbar spine shows degenerative spondylolisthesis at L4-5 which is also moderate to severe spinal canal stenosis as well as severe right and moderate to severe left foraminal stenosis.  The patient is status post a bilateral L4-5 transforaminal epidural steroid injection on March 23, 2018 which has provided 70-80% relief of his pain.  He is very pleased with his result.  -The patient also has known osteoarthritis in the knees.  He has had cortisone injections in his knees at [facility] orthopedics which have provided short-term relief of his pain.         Plan:     A shared decision making plan was used.  The patient's values and choices were respected.  The following represents what was discussed and decided upon by the physician assistant and the patient.  A professional  is present for the visit.    1.  DIAGNOSTIC TESTS:  I reviewed the MRI lumbar spine.  No further diagnostic tests were ordered.    2.  PHYSICAL THERAPY: The patient completed physical therapy for his lower back in November 2017.  Asked him to continue doing his home exercises.    3.  MEDICATIONS: No changes are made to the patient's medications.  He uses gabapentin 600 mg 3 times daily, diclofenac 50 mg twice daily, and Tylenol as needed.    4.  INTERVENTIONS: No further interventions were ordered.  If the patient's pain were to return, I recommend repeating the bilateral L4-5 transforaminal epidural steroid injection.  The patient found that this injection was much more effective than the right L4-5 transforaminal epidural steroid injection.    5.  PATIENT EDUCATION:    -I  offered to refer the patient back to [facility] orthopedics.  The patient reports that since his overall pain has improved since the injection, he is not interested in following up with any specialist at this time.  -The patient is in agreement with the above plan.  All questions were answered.    6.  FOLLOW-UP: The patient can follow-up with me as needed.  If he has any questions or concerns, he should not hesitate to call.    Subjective:     Mariusz Hebert is a 68 y.o. male who presents today for follow-up regarding low back pain with radiation into the right greater than left lower extremity.  The patient status post a bilateral L4-5 transforaminal epidural steroid injection on March 23, 2018.  The patient reports that this injection is provided 7080% relief of his pain.    The patient states that he has only mild residual low back pain.  The pain radiates into the right buttock, down the lateral thigh, into the anterior shin, and into the dorsal foot.  He has numbness and tingling in the same distribution as his pain which is most pronounced in the foot.  The patient reports that the pain in the right leg and the numbness in the right leg are mild at this point.  He is no longer having any pain radiating down the left leg.  He rates his pain today as a 5 out of 10.  His pain is aggravated with cold weather.  It is alleviated with taking pain relieving medications.    The patient completed physical therapy in November 2017.  He is using gabapentin 600 mg 3 times daily, diclofenac 50 mg twice daily, and Tylenol as needed.    Past medical history is reviewed and is unchanged in the interim.    Family history is reviewed and is unchanged in the interim.    Review of Systems:  Positive for numbness/tingling.  Negative for loss of bowel/bladder control, footdrop, weakness, headache, dizziness, nausea/vomiting, blurry vision, balance changes.     Objective:   CONSTITUTIONAL:  Vital signs as above.  No acute distress.  The  patient is well nourished and well groomed.    PSYCHIATRIC:  The patient is awake, alert, oriented to person, place and time.  The patient is answering questions appropriately with clear speech.  Normal affect.  HEENT: Normocephalic, atraumatic.  Sclera clear.    SKIN:  Skin over the face, posterior torso, bilateral upper and lower extremities is clean, dry, intact without rashes.  MUSCULOSKELETAL: Patient does have a cane for assistance.     The patient has 5/5 strength for the bilateral hip flexors, knee flexors/extensors, ankle dorsiflexors/plantar flexors, ankle evertors/invertors.    NEUROLOGICAL: Subjective diminished/altered sensation in the right L5 dermatome.     RESULTS:  MRI lumbar spine from [facility] dated October 7, 2017 was reviewed.  This shows 8 mm of degenerative anterolisthesis of L4 on L5.  This results in moderate to severe spinal canal stenosis.  The patient also has moderate to severe spinal canal stenosis due to degenerative changes superimposed on developmental spinal canal stenosis.  At L4-5 there is moderate to severe bilateral facet arthropathy, severe right foraminal stenosis, and moderate to severe left foraminal stenosis.  Please see report for further details.

## 2021-06-18 NOTE — PROGRESS NOTES
ASSESSMENT AND PLAN:  1. Neuritis of left lower extremity burning pain is still present daily he notes that after an injection pain decreases however is taking his medication faithfully he is wondering if he can get another injection.  I am increased his gabapentin to total of 2700 mg per day if his symptoms continued to be present would send him again for therapeutic injection gabapentin (NEURONTIN) 300 MG capsule   2. Lumbar Canal Stenosis     3. Benign prostatic hyperplasia (BPH) with urinary urgency no changes noted with urinary stream using medication without side effects tamsulosin (FLOMAX) 0.4 mg Cp24   4. Other constipation this symptom is controlled as long as he uses a fiber supplement warned him that because of increased dose of gabapentin he needs to be watchful of his fluid content his diet polyethylene glycol (GLYCOLAX) 17 gram/dose powder   5. Postnasal drip currently his symptoms are controlled with the cetirizine cetirizine (ZYRTEC) 10 MG tablet       CHIEF COMPLAINT:  Chief Complaint   Patient presents with     Follow-up     bladder        HISTORY OF PRESENT ILLNESS:  Mariusz is a 68 y.o. male presenting for a follow-up of back pain. Mariusz is present with a Nettie . On 3/23/2018 he receieved  bilateral L4-5 transforaminal epidural steroid injection. On the follow-up visit on 4/4/2018 he noted that the injection provided 70-80% relief of his pain. Currently, he states that his back pain and numbness is returning. The numbness is worse than the pain at this time. He is taking his medications faithfully. He is inquiring about receiving another injection. He is agreeable to increasing his gabapentin dose to 900mg 3 times a day. He has been going on walks twice a day.     Constipation: He is using his Glycolax powder faithfully.     REVIEW OF SYSTEMS:   He denies dysuria.    All other 10 point review of systems are negative.    PFSH:  . Pertinent past, family, social and medical history reviewed.      TOBACCO USE:  History   Smoking Status     Never Smoker   Smokeless Tobacco     Current User     Types: Chew     Comment: chews betel nut       VITALS:  Vitals:    06/06/18 0800   BP: 116/74   Patient Site: Right Arm   Patient Position: Sitting   Cuff Size: Adult Regular   Pulse: 70   Resp: 18   Temp: 97.9  F (36.6  C)   TempSrc: Oral   SpO2: 97%   Weight: 144 lb 6 oz (65.5 kg)   Height: 5' (1.524 m)     Wt Readings from Last 3 Encounters:   06/06/18 144 lb 6 oz (65.5 kg)   04/06/18 147 lb (66.7 kg)   03/28/18 147 lb 3.2 oz (66.8 kg)     Body mass index is 28.2 kg/(m^2).      PHYSICAL EXAM:  General: Alert, cooperative, no distress, appears stated age  Head: Normocephalic, without obvious abnormality, atraumatic  Musculoskeletal: Back symmetric, no curvature, ROM normal, no CVA tenderness.  CVS: No edema noted.   Neurologic: No tremor, no focal findings.     Psych: Oriented x3. Affect normal.     DATA REVIEWED:  Additional History from Old Records Summarized (2): Reviewed Guillerimna Palmer PA-C note from 4/4/2018 regarding back pain.  Decision to Obtain Records (1): None  Radiology Tests Summarized or Ordered (1): None  Labs Reviewed or Ordered (1): None  Medicine Test Summarized or Ordered (1): None  Independent Review of EKG or X-RAY(2 each): None    The visit lasted a total of 14 minutes face to face with the patient. Over 50% of the time was spent counseling and educating the patient about back pain.     INano, am scribing for and in the presence of, Dr. Dyson.    charlotte LOCO, personally performed the services described in this documentation, as scribed by Nano Garcia in my presence, and it is both accurate and complete.      MEDICATIONS:  Current Outpatient Prescriptions   Medication Sig Dispense Refill     cetirizine (ZYRTEC) 10 MG tablet TAKE 1 PILL BY MOUTH EVERYDAY FOR ALLERGY 90 tablet 3     diclofenac (VOLTAREN) 50 MG EC tablet Take 1 tablet (50 mg total) by mouth 2 (two) times a  day. 60 tablet 11     DULoxetine (CYMBALTA) 60 MG capsule   9     gabapentin (NEURONTIN) 300 MG capsule TAKE 2 CAPSULES (600 MG TOTAL) BY MOUTH 3 (THREE) TIMES A DAY. 540 capsule 3     ketorolac (ACULAR LS) 0.4 % Drop APPLY 2 DROPS IN EACH EYE DAILY AS NEEDED 1 Bottle 1     oxybutynin (DITROPAN XL) 5 MG ER tablet TAKE 1 TABLET (5 MG TOTAL) BY MOUTH DAILY. 90 tablet 2     polyethylene glycol (GLYCOLAX) 17 gram/dose powder MIX 17 GRAMS IN 8 OZ WATER AND DRINK BY MOUTH DAILY 527 g 5     tamsulosin (FLOMAX) 0.4 mg Cp24 TAKE 1 CAPSULE (0.4 MG TOTAL) BY MOUTH DAILY AFTER LUNCH. 30 capsule 11     VITAMIN D2 50,000 unit capsule TAKE 1 CAPSULE (50,000 UNITS TOTAL) BY MOUTH ONCE A WEEK. 4 capsule 3     acetaminophen (TYLENOL) 325 MG tablet Take 2 tablets (650 mg total) by mouth every 6 (six) hours as needed. 100 tablet 12     bromfenac (BROMDAY) 0.09 % ophthalmic solution Apply 2 drops each daily 5 mL 5     naproxen (NAPROSYN) 500 MG tablet TAKE 1 PILL BY MOUTH TWO TIMES A DAY WITH MEALS 60 tablet 2     oxyCODONE-acetaminophen (PERCOCET) 5-325 mg per tablet Take 1 tablet by mouth every 6 (six) hours as needed for pain.       polyvinyl alcohol (LIQUIFILM TEARS) 1.4 % ophthalmic solution 1 drop as needed for dry eyes.       sodium chloride (OCEAN) 0.65 % nasal spray 1 spray into each nostril as needed for congestion.       traZODone (DESYREL) 50 MG tablet TAKE 1 PILL (50 MG TOTAL) BY MOUTH BEDTIME FOR SLEEP 30 tablet 11     No current facility-administered medications for this visit.        Total Data Points: 2

## 2021-06-19 NOTE — PROGRESS NOTES
Patient presents today for a right knee injection.  Patient has history of chronic right knee pain.  He has had approximately 2 months of relief with right knee steroid injections in the past through [facility] orthopedics, most recently on March 14, 2017.  He complains of anterior knee pain worse with walking and climbing stairs and alleviated with rest and wearing a brace.  Patient also has right low back pain with radiation to the right lower extremity.  Patient has had significant relief of radicular pain with lumbar epidural steroid injections in the past.  He is status post a bilateral L4-5 transfemoral epidural steroid injection on March 23, 2018 which provided 70-80% relief of his pain lasting more than 3 months.  Over the past 5-6 weeks, he has had return of the right lower extremity radicular pain.  He continues to be free of the left leg pain.    Diagnosis: Right knee joint pain, Knee osteoarthritis    The risks and benefits of the procedure, including infection,and potentially no relief were discussed with the patient and the patient gave written and verbal consent to proceed.    A professional  is present for the visit.    The inferior lateral approach) was used.  The patient was placed in a seated position. The patellar tendon was localized and then the spot inferior and lateral to the patella was marked using the cap of the needle.  This area was the cleansed with chloroprep swabs x 3.  A 25 guage 1.5 inch needle was inserted and directed medially and slightly superiorly.  There was no resistance.  Aspiration  was negative for any blood.   Subsequently 40 mg of Depomedrol mixed with 2 mL of 1% lidocaine was injected into the knee joint.      The pt was able to sit up and ambulate of his own power.  The patient did not report any increased pain.      I did place an order for a repeat right L4-5 transfemoral epidural steroid injection as patient is only having right lower extremity radicular  pain at this time.  I told the patient that he can have this injection on August 27, 2018 or later.

## 2021-06-19 NOTE — PROGRESS NOTES
Assessment:   Mariusz Hebert is a 68 y.o. y.o. male with past medical history significant for BPH, hypercholesterolemia who presents today for follow-up regarding chronic low back pain with radiation into the right lower cavity.  Patient has a history of decompressive lumbar laminectomy 2011.  MRI lumbar spine shows degenerative spondylolisthesis at L4-5 results in moderate to severe spinal canal stenosis as well as severe right and moderate to severe left foraminal stenosis.  The patient status post a bilateral L4-5 transforaminal epidural steroid injection on March 23, 2018 which provided 70-80% relief of his pain.  Pain gradually returned and became severe 3 weeks ago.  -Patient also symptomatic from osteoarthritis in the knees.  He is having significant right knee pain now.  He has had short-term relief of his knee pain with steroid injections into the knees at [facility].       Plan:     A shared decision making plan was used.  The patient's values and choices were respected.  The following represents what was discussed and decided upon by the physician assistant and the patient.  An  is present for today's visit.    1.  DIAGNOSTIC TESTS: I reviewed the MRI lumbar spine and the MRI of the right knee.  No further diagnostic tests ordered.    2.  PHYSICAL THERAPY: Patient completed physical therapy in November 2017.  I encouraged him to continue doing his home exercises.    3.  MEDICATIONS: No changes are made to the patient's medications.  Continue using gabapentin 600 mg twice daily and diclofenac 50 mg daily.    4.  INTERVENTIONS: I offered the patient a right L4-5, L5-S1 transforaminal epidural steroid injection.  I also offered the patient a right knee injection.  Patient indicated he would like to have a knee injection versus the knee is the more severe area of pain.  Patient will return to the clinic for his right knee injection.  I told the patient that he can have his right L4-5 and  L5-S1 transforaminal epidural steroid injection 2 weeks after the knee injection.    5.  PATIENT EDUCATION: Patient is not interested in talking with a surgeon for the spine or the knee at this point.  This patient is in agreement the above plan.  All questions were answered.    6.  FOLLOW-UP: Patient return to the clinic for a right knee injection.  If he has any questions or concerns in the meantime, he should not hesitate to contact our clinic.    Subjective:     Mariusz Hebert is a 68 y.o. male who presents today for follow-up regarding chronic low back pain with radiation into the right lower extremity and right knee pain.  I last saw the patient on April 6, 2018.  At that time the patient was following up after a bilateral L4-5 transforaminal epidural steroid injection which was performed on March 23, 2018.  Patient reports that injection provided 70-80% relief of his pain.  Patient states that after his follow-up visit with me pain gradually returned.  It became more severe 3 weeks ago.  He denies any injury or event to cause the pain.  Patient states that the pain feels similar to his preinjection pain except that it is all right-sided.  He is not having any left-sided pain.  Also the knee pain is severe.  Patient has previously had knee injections through [facility], but he has not had any knee injections within the past few months.  Patient reports that the knee injections provided short-term relief of his pain.    Patient complains of right low back pain.  Pain radiates into the right buttock, down the lateral thigh, into the anterolateral shin, extending into the dorsal foot.  He has numbness in the same distribution as his pain.  Also significant right anterior knee.  He denies weakness on the legs.  He rates his pain today as a 9 out of 10.  At its best it is a 5 out of 10.  At its worst it is a 10 out of 10.  Patient's pain is aggravated with walking and alleviated with rest.  Patient has been  wearing a hinged knee brace on the right which has provided relief of his pain as well.    Patient did physical therapy November 2017.  Gabapentin 600 mg twice daily.  Uses diclofenac 50 mg daily.    Past medical history is reviewed and is unchanged in the interim.    Family history is reviewed and is unchanged in the interim.    Review of Systems:  Positive for numbness/tingling.  Negative for loss of bowel/bladder control, footdrop, weakness, headache, dizziness, nausea/vomiting or vision, balance changes.     Objective:   CONSTITUTIONAL:  Vital signs as above.  No acute distress.  The patient is well nourished and well groomed.    PSYCHIATRIC:  The patient is awake, alert, oriented to person, place and time.  The patient is answering questions appropriately with clear speech.  Normal affect.  HEENT: Normocephalic, atraumatic.  Sclera clear.    SKIN:  Skin over the face, posterior torso, bilateral upper and lower extremities is clean, dry, intact without rashes.  MUSCULOSKELETAL:  Gait is antalgic, favoring the right.  The patient has 5/5 strength for the bilateral hip flexors, knee flexors/extensors, ankle dorsiflexors/plantar flexors, ankle evertors/invertors.  Range of motion of the right knee is full.  There is palpable crepitus of the right knee.  No instability of the right knee.  Negative anterior/posterior drawer test.  NEUROLOGICAL:Sensation to light touch is intact in the bilateral L4, L5, and S1 dermatomes.       RESULTS:    MRI lumbar spine from [facility] dated October 7, 2017 was reviewed.  This shows 8 mm of degenerative anterolisthesis of L4 on L5.  This results in moderate to severe spinal canal stenosis.  The patient also has moderate to severe spinal canal stenosis due to degenerative changes superimposed on developmental spinal canal stenosis.  At L4-5 there is moderate to severe bilateral facet arthropathy, severe right foraminal stenosis, and moderate to severe left foraminal stenosis.   Please see report for further details.     MRI right knee 5/23/16  IMPRESSION:   CONCLUSION:  1.  Radial tear of the posterior horn of the medial meniscus at its posterior root attachment which is frankly disrupted with mild medial extrusion of the entire meniscus.     2.  Lateral meniscus, both cruciate and collateral ligaments are intact.     3.  Moderately advanced degenerative osteoarthritic changes in the medial compartment.     4.  Moderate chondromalacia patella.

## 2021-06-20 NOTE — LETTER
Letter by Savannah Desai SW at      Author: Savannah Desai SW Service: -- Author Type: --    Filed:  Encounter Date: 1/20/2020 Status: Signed         January 20, 2020    CHARO PENA  [address] [place] [address]        Dear  Patel Vee to [facility] ([facility]+) health program. My name is JANNETH Gonzalez. I am your MSC+ care coordinator.     I will call you soon to see how you are doing and determine what needs you may have. My job is to help connect you to services, complete an assessment, and develop a care plan with you. There is no charge to you for the care coordination and assessment services. Our goal is to keep you as healthy and independent as possible.     MSC+ includes the benefits you may receive from Medical Assistance.    Soon, you will receive a new MSC+ member identification (ID) card from [facility]. When you receive it, please use this card along with your [place] [facility] card and Prescription Drug Coverage Program card. When you receive, it please use this card where you get your health services. If you have Medicare, you will need to show your Medicare card when you get health services.    If you have questions, please call me at [phone]. If you reach my voice mail, leave a message and your phone number. If you are hearing impaired, please call the [place] Relay at 301 or [phone] (hzebeg-hy-fsvrfk relay service).    Sincerely,        JANNETH Gonzalez    E-mail: [email or web address]  Phone: [phone]    Care Manager  [facility]+ [id number] DHS Approved ([id number])  [id number] (11/18)

## 2021-06-20 NOTE — PROGRESS NOTES
Assessment:   Mariusz Hebert is a 68 y.o. y.o. male with past medical history significant for BPH, hypercholesterolemia who presents today for follow-up regarding 2 areas of pain.  1.  Chronic low back pain with radiation into the right lower extremity.  Patient is a history of a decompressive lumbar laminectomy 2011.  MRI lumbar spine shows degenerative spondylolisthesis at L4-5 which is also moderate to severe spinal canal stenosis as well as severe right and moderate to severe left foraminal stenosis.  Patient status post a right L4-5, L5-S1 transforaminal epidural steroid injection on September 5, 2018 which provided 50% relief of his pain.  2.  Chronic right knee pain.  Patient status post a right intra-articular knee corticosteroid injection on August 13, 2018 which has provided 50% relief of his pain.       Plan:     A shared decision making plan was used.  The patient's values and choices were respected.  The following represents what was discussed and decided upon by the physician assistant and the patient.  A professional  is present for the visit.    1.  DIAGNOSTIC TESTS: I reviewed the MRI lumbar spine and the report of the MRI right knee.  No further diagnostic tests were ordered.      2.  PHYSICAL THERAPY:  Patient completed physical therapy November 2017.  He should continue doing his home exercises.    3.  MEDICATIONS:  No changes are made to the patient's medications.  He uses gabapentin 600 mg twice daily, diclofenac 50 mg daily, and Tylenol 325 mg as needed.     4.  INTERVENTIONS: No additional interventions were ordered.    5.  REFERRALS:    -I entered a referral for the patient to see [facility] to get a new hinged knee sleeve.  He has one from several years ago, but it is getting worn out.  He does not stay in place like he used to.  -I also offered a referral for him to see a spine surgeon and/or a knee surgeon.  Patient declined.  He is not interested in pursuing surgery.  He  would like to continue with nonsurgical treatments for now.    6.  FOLLOW-UP: I will see the patient back in clinic in 3 months for follow-up.  If he has any questions or concerns in the meantime, he should not hesitate contact our clinic.    Subjective:     Mariusz Hebert is a 68 y.o. male who presents today for follow-up regarding chronic low back pain with radiation into the right lower extremity and right knee pain.  Patient status post a right L4-5, L5-S1 transforaminal epidural steroid injection on September 5, 2018 which has provided 50% relief of his low back and radicular leg pain.  He is also status post a right knee steroid injection on August 13, 2018 which has provided 50% relief of his knee pain.    Patient states that he has mild residual low back pain on the right.  He continues to have right anterior knee pain and pain radiating down to the right foot.  He rates his pain today as a 6 out of 10.  At its best it is a 4 out of 10.  At its worst it is a 9 out of 10.  Patient's pain tends to be worse with walking and alleviated with rest.  He denies any new symptoms since he was last seen.  He states that he has some numbness and tingling in the same description as his pain radiating down the right leg.  He denies weakness.  Patient requests a prescription for a new knee brace.  Patient received a knee brace several years ago.  He states it is getting loose and worn out.  It does not stay in place anymore.    Patient physical therapy for his lower back in November 2017.  He uses gabapentin 6 and milligrams twice daily, diclofenac 50 mg daily, Tylenol 325 mg as needed for pain.    Past medical history is reviewed and is unchanged in the interim.    Family history is reviewed and is unchanged in the interim.    Review of Systems:  Positive for numbness/tingling, blurry vision.  Negative for loss of bowel/bladder control, footdrop, weakness, headache, dizziness, nausea/vomiting, balance changes.     Objective:    CONSTITUTIONAL:  Vital signs as above.  No acute distress.  The patient is well nourished and well groomed.    PSYCHIATRIC:  The patient is awake, alert, oriented to person, place and time.  The patient is answering questions appropriately with clear speech.  Normal affect.  HEENT: Normocephalic, atraumatic.  Sclera clear.    SKIN:  Skin over the face, posterior torso, bilateral upper and lower extremities is clean, dry, intact without rashes.  MUSCULOSKELETAL:  The patient has 5/5 strength for the bilateral hip flexors, knee flexors/extensors, ankle dorsiflexors/plantar flexors, ankle evertors/invertors.  No tenderness palpation about the right knee.  There is no effusion.  No erythema, ecchymosis, or lesion.  Patient has crepitus with passive flexion extension of the right knee.  NEUROLOGICAL:    Sensation to light touch is intact in the bilateral L4, L5, and S1 dermatomes.       RESULTS:    MRI lumbar spine from [facility] dated October 7, 2017 was reviewed.  This shows 8 mm of degenerative anterolisthesis of L4 on L5.  This results in moderate to severe spinal canal stenosis.  The patient also has moderate to severe spinal canal stenosis due to degenerative changes superimposed on developmental spinal canal stenosis.  At L4-5 there is moderate to severe bilateral facet arthropathy, severe right foraminal stenosis, and moderate to severe left foraminal stenosis.  Please see report for further details.      MRI right knee 5/23/16  IMPRESSION:   CONCLUSION:  1.  Radial tear of the posterior horn of the medial meniscus at its posterior root attachment which is frankly disrupted with mild medial extrusion of the entire meniscus.      2.  Lateral meniscus, both cruciate and collateral ligaments are intact.      3.  Moderately advanced degenerative osteoarthritic changes in the medial compartment.      4.  Moderate chondromalacia patella.

## 2021-06-20 NOTE — PROGRESS NOTES
.  ASSESSMENT and plan   1. Pure hypercholesterolemia  He has been controlling his cholesterol by his diet he says he is not eating any lately or deeply fried foods recheck her cholesterol last total cholesterol was above 240.    2. Primary osteoarthritis of right knee  He remarks that the knee injection did help having less pain and tenderness in the knee he is able to walk without discomfort from the knee.    3. Lumbar Canal Stenosis  His back pain is better however he was unable to tolerate the gabapentin at the increased dose after about a month he went back to his regular dose of 1800 mg per day no constipation noted.  His back pain is worse when he walks and when he lies down however he reports that better than before because she has been seeing the back clinic    4. Benign prostatic hyperplasia (BPH) with urinary urgency  Currently medications working well no dryness of mouth, no dizziness.  Able to void without discomfort      Need for immunization against influenza5.    Received a flu vaccine today  There are no Patient Instructions on file for this visit.    No orders of the defined types were placed in this encounter.    There are no discontinued medications.    No Follow-up on file.    CHIEF COMPLAINT:  Chief Complaint   Patient presents with     Back Pain     lumbar       HISTORY OF PRESENT ILLNESS:  Mariusz is a 68 y.o. male     Who is here after approximately 3 months he has a history of lumbar canal stenosis, BPH and right knee pain.  He went to the [facility] back clinic and has been seeing Dr. Vitale and Renu adam. recently received an injection for his back which he says is helped and about 2 weeks ago and received a steroid injection for his right knee.  Discomfort.  He said he reduce the level of gabapentin because it made him too sleepy during the day.  He says his pain has decreased he is able to walk with less    REVIEW OF SYSTEMS:     Musculoskeletal system positive for lumbar back pain left  hip pain right hip pain and numbness in his right leg.  He denies having any knee pain today  All other 10 point review of systems are negative.    PFSH:  Not working    TOBACCO USE:  History   Smoking Status     Never Smoker   Smokeless Tobacco     Current User     Types: Chew     Comment: chews betel nut       VITALS:  There were no vitals filed for this visit.  Wt Readings from Last 3 Encounters:   08/13/18 142 lb (64.4 kg)   07/27/18 142 lb (64.4 kg)   06/06/18 144 lb 6 oz (65.5 kg)       PHYSICAL EXAM:    Interactive elderly gentleman sitting in exam room in no acute distress  Respiratory system clear to auscultation equal breath sounds no wheeze no crackles  CVS regular rate and rhythm no murmurs rubs or gallops appreciated  Abdomen soft there is no focal tenderness bowel sounds are absent no hepatosplenomegaly  Musculoskeletal system tenderness elicited when I palpate his lumbar spine at L4 he has tenderness over the left SI joint he is tenderness over the medial aspect of the right leg in the lateral aspect of the right quadriceps muscle.    Neuro he can flex and extend his right knee without difficulty today    DATA REVIEWED:  Additional History from Old Records Summarized (2):   Reviewed last visit with spine clinic where he received a knee injection by Renu he tolerated the procedure welltholl and had relief of his right knee pain  Decision to Obtain Records (1): 0  Radiology Tests Summarized or Ordered (1): 0  Labs Reviewed or Ordered (1): 1 lipid panel and BMP  Medicine Test Summarized or Ordered (1): 0  Independent Review of EKG or X-RAY(2 each): 0    The visit lasted a total of 15 minutes face to face with the patient. Over 50% of the time was spent counseling and educating the patient about lumbar canal stenosis, knee pain , bph. And high cholesterol.    MEDICATIONS:  Current Outpatient Prescriptions   Medication Sig Dispense Refill     cetirizine (ZYRTEC) 10 MG tablet TAKE 1 PILL BY MOUTH  EVERYDAY FOR ALLERGY 90 tablet 3     diclofenac (VOLTAREN) 50 MG EC tablet TAKE 1 PILL BY MOUTH 2 TIMES EVERYDAY 60 tablet 11     DULoxetine (CYMBALTA) 60 MG capsule Take 1 capsule (60 mg total) by mouth daily. 30 capsule 9     gabapentin (NEURONTIN) 300 MG capsule Use 3 capsules three days daily 270 capsule 3     ketorolac (ACULAR LS) 0.4 % Drop APPLY 2 DROPS IN EACH EYE DAILY AS NEEDED 5 mL 1     oxybutynin (DITROPAN XL) 5 MG ER tablet TAKE 1 TABLET (5 MG TOTAL) BY MOUTH DAILY. 90 tablet 2     polyethylene glycol (GLYCOLAX) 17 gram/dose powder MIX 17 GRAMS IN 8 OZ WATER AND DRINK BY MOUTH DAILY 527 g 5     tamsulosin (FLOMAX) 0.4 mg Cp24 TAKE 1 CAPSULE (0.4 MG TOTAL) BY MOUTH DAILY AFTER LUNCH. 30 capsule 11     VITAMIN D2 50,000 unit capsule TAKE 1 CAPSULE (50,000 UNITS TOTAL) BY MOUTH ONCE A WEEK. 4 capsule 3     acetaminophen (TYLENOL) 325 MG tablet Take 2 tablets (650 mg total) by mouth every 6 (six) hours as needed. 100 tablet 12     bromfenac (BROMDAY) 0.09 % ophthalmic solution Apply 2 drops each daily 5 mL 5     diclofenac (VOLTAREN) 50 MG EC tablet Take 1 tablet (50 mg total) by mouth 2 (two) times a day. 60 tablet 11     oxyCODONE-acetaminophen (PERCOCET) 5-325 mg per tablet Take 1 tablet by mouth every 6 (six) hours as needed for pain.       polyvinyl alcohol (LIQUIFILM TEARS) 1.4 % ophthalmic solution 1 drop as needed for dry eyes.       sodium chloride (OCEAN) 0.65 % nasal spray 1 spray into each nostril as needed for congestion.       traZODone (DESYREL) 50 MG tablet TAKE 1 PILL (50 MG TOTAL) BY MOUTH BEDTIME FOR SLEEP 30 tablet 11     No current facility-administered medications for this visit.      Please note that this clinical encounter uses voice recognition software, there may be typographical errors present      Data points 3

## 2021-06-21 NOTE — LETTER
Letter by Savannah Desai SW at      Author: Savannah Desai SW Service: -- Author Type: --    Filed:  Encounter Date: 11/17/2020 Status: (Other)       [facility]  [address], [address]  [place] [address]  Phone:  [phone]  Fax:  [phone]        November 17, 2020    MARIUSZ PENA  [address] [place] [address]    Dear Mariusz,    Zoë is a copy of your completed PCA Assessment and Service Plan.  This is for your records and no action is required by you.  If you have additional questions regarding your assessment please contact me at [phone]. If you feel that your needs are not being met, please contact the Clinical Supervisor at [phone].    Sincerely,      JANNETH Gonzalez    E-mail: [email or web address]  Phone: [phone]    Care Manager  [facility]            Enclosure:  Completed PCA assessment

## 2021-06-21 NOTE — LETTER
Letter by Savannah Desai SW at      Author: Savannah Desai SW Service: -- Author Type: --    Filed:  Encounter Date: 11/17/2020 Status: (Other)       November 30, 2020    MARIUSZ PENA  [address] [place] [address]        Dear Mariusz:    At [facility], we are dedicated to improving your health and well-being. Enclosed is the Comprehensive Care Plan that we developed with you on 11/12/2020. Please review the Care Plan carefully.    As a reminder, some of the things we discussed at your visit include:    Your physical and mental health    Ways to reduce falls    Health care needs you may have    Dont forget to contact your care coordinator if you:    Have been hospitalized or plan to be hospitalized     Have had a fall     Have experienced a change in physical health    Are experiencing emotional problems     If you do not agree with your Care Plan, have questions about it, or have experienced a change in your needs, please call me at [phone]. If you are hearing impaired, please call the [place] Relay at [phone] or [phone] (waglxy-ne-bcuric relay service).    Sincerely,          JANNETH Gonzalez    E-mail: [email or web address]  Phone: [phone]    Care Manager  [facility]+[id number] [place] [id number]     [id number] (11/18)

## 2021-06-21 NOTE — LETTER
Letter by Savannah Desai SW at      Author: Savannah Desai SW Service: -- Author Type: --    Filed:  Encounter Date: 11/6/2020 Status: (Other)       November 11, 2020    MARIUSZ PENA  [address] [place] [address]    Dear Mariusz:     Your Care Coordinator has been unable to reach you by telephone. I am writing to ask you or a family member to call me at [phone]. If you reach my voicemail, please leave a message with your daytime telephone number and a date and time that I can call you. If you are hearing impaired, please call the [place] Relay at 355 or [phone] (sfdvjr-iy-lyszmd relay service).     The reason I am trying to reach you is:     [] Six (6) month check-in  [x] To schedule your annual assessment  [] Other:      Please call me as soon as you receive this letter. I look forward to speaking with you.    Sincerely,      JANNETH Gonzalez    E-mail: [email or web address]  Phone: [phone]    Care Manager  [facility]+ [id number] [phone] [id number]    [id number] (11/18)

## 2021-06-22 NOTE — PROGRESS NOTES
ASSESSMENT AND PLAN:  1. Lumbar Canal Stenosis current medications symptoms are stable no increase to the pain rated between a 4-6    2. Neuritis of left lower extremity takes duloxetine takes gabapentin observe at baseline    3. Benign prostatic hyperplasia (BPH) with urinary urgency symptoms are controlled I have asked him not to stop using the Flomax    4. Chronic idiopathic constipation refilling medication.        No orders of the defined types were placed in this encounter.    There are no discontinued medications.    Return in about 3 months (around 4/8/2019).    CHIEF COMPLAINT:  Chief Complaint   Patient presents with     Follow-up     lumbar back pain       HISTORY OF PRESENT ILLNESS:  Mariusz is a 68 y.o. male with chronic low back pain with radiation into the right lower extremity status post decompressive lumbar laminectomy in 2011, who presents to the clinic today for follow up on low back pain. Mariusz is present with a Nettie . MRI of the lumbar spine on 09/21/2017 showed degenerative spondylolisthesis at L4-5 which results in moderate to severe spinal canal stenosis as well as severe right and moderate to severe left foraminal stenosis. [facility] on 12[facility]2018 at which time they had recommended trying try an L5-S1 interlaminar epidural steroid injection on the right, which he had declined at that time. The patient was also evaluated for worsening chronic right knee pain, and was offered series of three Euflexxa injections. He is only taking his pain medications as needed for pain rather than consistently. Mariusz has been able to sleep. He continues to deal with constipation, and is able to have daily bowel movements with taking Glycolax. He denies any issues with urination such as dysuria or frequent nocturia.    REVIEW OF SYSTEMS:   General: Negative for poor sleep.  Musculoskeletal: Positive for low back pain with radiation into right lower extremity and right knee  pain.  GI: Positive for constipation.  : Negative for dysuria or frequent nocturia.  All other systems are negative.    PFSH:  Reviewed as below.     TOBACCO USE:  Social History     Tobacco Use   Smoking Status Never Smoker   Smokeless Tobacco Current User     Types: Chew   Tobacco Comment    chews betel nut       VITALS:  Vitals:    01/08/19 0957   BP: 126/80   Pulse: 74   Resp: 18   Temp: 98.1  F (36.7  C)   TempSrc: Oral   SpO2: 97%   Weight: 146 lb 4 oz (66.3 kg)   Height: 5' (1.524 m)     Wt Readings from Last 3 Encounters:   01/08/19 146 lb 4 oz (66.3 kg)   12/20/18 143 lb (64.9 kg)   09/20/18 141 lb (64 kg)     Body mass index is 28.56 kg/m .    PHYSICAL EXAM:  General: Alert, cooperative, no distress, appears stated age  Head: Normocephalic, without obvious abnormality, atraumatic  Back: Tender to palpation at L4 and left SI joint  Lungs: Clear to auscultation bilaterally, respirations unlabored  Heart: Regular rate and rhythm, S1 and S2 normal, no murmur, rub, or gallop  Musculoskeletal: Able to flex and extend right knee without difficulty, tender to palpation over lateral aspect of right quadriceps muscle  Neurologic:  A & O x 3.  No tremor, no focal findings.     DATA REVIEWED:  Additional History from Old Records Summarized (2): Reviewed [facility] note from Guillermina Palmer PA-C on 12/20/2018 at which time L5-S1 interlaminar epidural steroid injection on the right was recommended, which he had declined at that time. The patient was also evaluated for worsening chronic right knee pain, and was offered series of three Euflexxa injections.   Decision to Obtain Records (1): none  Radiology Tests Summarized or Ordered (1): MRI of the lumbar spine on 09/21/2017 showed degenerative spondylolisthesis at L4-5 which results in moderate to severe spinal canal stenosis as well as severe right and moderate to severe left foraminal stenosis.   Labs Reviewed or Ordered (1): none  Medicine Test Summarized or Ordered  (1): none  Independent Review of EKG or X-RAY(2 each): none    I, Aviva Claros, am scribing for and in the presence of, Dr. Dyson.    I, Dr. Dyson, personally performed the services described in this documentation, as scribed by Aviva Claros in my presence, and it is both accurate and complete.     MEDICATIONS:  Current Outpatient Medications   Medication Sig Dispense Refill     cetirizine (ZYRTEC) 10 MG tablet TAKE 1 PILL BY MOUTH EVERYDAY FOR ALLERGY 90 tablet 3     diclofenac (VOLTAREN) 50 MG EC tablet TAKE 1 PILL BY MOUTH 2 TIMES EVERYDAY 60 tablet 11     DULoxetine (CYMBALTA) 60 MG capsule Take 1 capsule (60 mg total) by mouth daily. 30 capsule 9     gabapentin (NEURONTIN) 300 MG capsule Use 3 capsules three days daily 270 capsule 3     ketorolac (ACULAR LS) 0.4 % Drop APPLY 2 DROPS IN EACH EYE DAILY AS NEEDED 5 mL 1     tamsulosin (FLOMAX) 0.4 mg Cp24 TAKE 1 CAPSULE (0.4 MG TOTAL) BY MOUTH DAILY AFTER LUNCH. 30 capsule 11     acetaminophen (TYLENOL) 325 MG tablet Take 2 tablets (650 mg total) by mouth every 6 (six) hours as needed. 100 tablet 12     bromfenac (BROMDAY) 0.09 % ophthalmic solution Apply 2 drops each daily 5 mL 5     diclofenac (VOLTAREN) 50 MG EC tablet Take 1 tablet (50 mg total) by mouth 2 (two) times a day. 60 tablet 11     oxybutynin (DITROPAN XL) 5 MG ER tablet TAKE 1 TABLET (5 MG TOTAL) BY MOUTH DAILY. 90 tablet 2     oxyCODONE-acetaminophen (PERCOCET) 5-325 mg per tablet Take 1 tablet by mouth every 6 (six) hours as needed for pain.       polyethylene glycol (GLYCOLAX) 17 gram/dose powder MIX 17 GRAMS IN 8 OZ WATER AND DRINK BY MOUTH DAILY 527 g 5     polyvinyl alcohol (LIQUIFILM TEARS) 1.4 % ophthalmic solution 1 drop as needed for dry eyes.       sodium chloride (OCEAN) 0.65 % nasal spray 1 spray into each nostril as needed for congestion.       VITAMIN D2 50,000 unit capsule TAKE 1 CAPSULE (50,000 UNITS TOTAL) BY MOUTH ONCE A WEEK. 4 capsule 3     No current facility-administered  medications for this visit.        Total Data Points: 3    Please note that this clinical encounter uses voice recognition software, there may be typographical errors present

## 2021-06-22 NOTE — PROGRESS NOTES
Assessment:   Mariusz Hebert is a 68 y.o. y.o. male with past medical history significant for BPH, hypercholesterolemia who presents today for follow-up regarding 2 areas of pain.  1.  Chronic low back pain with radiation into the right lower extremity.  Patient has a history of decompressive lumbar laminectomy 2011.  MRI lumbar spine shows degenerative spondylolisthesis at L4-5 which results in moderate to severe spinal canal stenosis as well as severe right and moderate to severe left foraminal stenosis.  Patient status post a right L4-5, L5-S1 transforaminal epidural steroid injection on September 5, 2018 which provided 50% relief of his pain lasting just over 2 months.  2.  Chronic right knee pain.  Patient status post a right intra-articular knee corticosteroid injection on August 13, 2018 which provided 50% relief of his pain lasting about 3 months.  Patient had had 2 right knee corticosteroid injections prior to this injection through [facility].  These injections also provided only short-term relief of his pain.       Plan:     A shared decision making plan was used.  The patient's values and choices were respected.  The following represents what was discussed and decided upon by the physician assistant and the patient.  A professional  present for the visit.    1.  DIAGNOSTIC TESTS: I reviewed the MRI lumbar spine and the report of the MRI right knee.  No further diagnostic tests were ordered.    2.  PHYSICAL THERAPY: Patient completed physical therapy.  I encouraged him to continue doing his home exercises.    3.  MEDICATIONS: No changes are made to the patient's medications.  He did not bring his medications to today's visit.  He has gabapentin, diclofenac, and Tylenol on his medication list.    4.  INTERVENTIONS: I offered the patient right knee Euflexxa injections under ultrasound guidance since patient has only had partial, short-term relief of his pain with right knee corticosteroid  injections.  Patient indicated he would like to proceed and an order was placed.  -Patient is not interested in repeating the back injection at this time, however, if the pain were to worsen, we could try another epidural steroid injection.  I would likely try an L5-S1 interlaminar epidural steroid injection the right L4-5 and L5-S1 transforaminal epidural steroid injection only provided 50% relief of his pain for 2 months.    5.  PATIENT EDUCATION: Patient reiterated that he is not surgery for the knee or the spine at this time.    6.  FOLLOW-UP: Patient will return to the clinic for his set of 3 right knee Euflexxa injections under ultrasound guidance.  If he has any questions or concerns in the meantime, he should not hesitate to contact our clinic.    Subjective:     Mariusz Hebert is a 68 y.o. male who presents today for follow-up regarding chronic right low back pain with radiation into the right lower extremity and chronic right knee pain.  The patient underwent a right L4-5 and L5-S1 transforaminal epidural steroid injection on September 5, 2018 which provided 50% relief of his radicular pain lasting 2 months.  He also had a right intra-articular knee corticosteroid injection on August 13, 2018 which provided 50% relief of his knee pain for about 3 months.  Patient reports that for the past 1 month, he has felt both areas of pain return.  The knee pain is more severe than the radicular pain at this time.    Patient complains of right anterior knee pain.  Pain is most pronounced at the medial aspect of the right anterior knee.  Pain is aggravated with walking and ascending stairs.  It is alleviated with applying his right knee brace and using a heating pad.    Patient also complains of pain which radiates from the right low back into the right buttock, down the lateral thigh, into the lateral shin.    Overall, patient rates his pain today as an 8 out of 10.  At its best it is a 6 out of 10.  At its worst it is a 10  out of 10.  Patient denies any new symptoms since he was last seen.  He denies any numbness, tingling, or weakness down the legs.    Patient physical therapy 1 year ago.  He did not bring his medications to today's visit.  He is gabapentin, diclofenac, and Tylenol on his medication list.    Past medical history is reviewed and is unchanged in the interim.    Family history is reviewed and is unchanged in the interim.    Review of Systems:  Positive for headache.  Negative for numbness/tingling, loss of bowel/bladder control, footdrop, weakness, dizziness, nausea/vomiting, blurry vision, balance changes.     Objective:   CONSTITUTIONAL:  Vital signs as above.  No acute distress.  The patient is well nourished and well groomed.    PSYCHIATRIC:  The patient is awake, alert, oriented to person, place and time.  The patient is answering questions appropriately with clear speech.  Normal affect.  HEENT: Normocephalic, atraumatic.  Sclera clear.    SKIN:  Skin over the face, posterior torso, bilateral upper and lower extremities is clean, dry, intact without rashes.  MUSCULOSKELETAL:  Gait is mildly antalgic, favoring the right.   The patient has 5/5 strength for the bilateral hip flexors, knee flexors/extensors, ankle dorsiflexors/plantar flexors, ankle evertors/invertors.  Patient has tenderness palpation along the medial aspect of the right knee joint line.  Patient has significant palpable crepitus with passive flexion and extension of the right knee.  Negative anterior/posterior drawer test.  Increased knee pain with varus and valgus stress.    NEUROLOGICAL:    Sensation to light touch is intact in the bilateral L4, L5, and S1 dermatomes.       RESULTS:    MRI lumbar spine from [facility] dated October 7, 2017 was reviewed.  This shows 8 mm of degenerative anterolisthesis of L4 on L5.  This results in moderate to severe spinal canal stenosis.  The patient also has moderate to severe spinal canal stenosis due to  degenerative changes superimposed on developmental spinal canal stenosis.  At L4-5 there is moderate to severe bilateral facet arthropathy, severe right foraminal stenosis, and moderate to severe left foraminal stenosis.  Please see report for further details.      MRI right knee 5/23/16  IMPRESSION:   CONCLUSION:  1.  Radial tear of the posterior horn of the medial meniscus at its posterior root attachment which is frankly disrupted with mild medial extrusion of the entire meniscus.      2.  Lateral meniscus, both cruciate and collateral ligaments are intact.      3.  Moderately advanced degenerative osteoarthritic changes in the medial compartment.      4.  Moderate chondromalacia patella.

## 2021-06-24 NOTE — TELEPHONE ENCOUNTER
Fax received from [facility], they have started the Prior Authorization Process via Cover My Meds    CoverMyMeds Key: [id number]    Medication Name: Bromfenac Sodium    Insurance Plan: BCBS  PBM:   Patient ID: not provided on fax    Please complete the PA process

## 2021-06-24 NOTE — PROGRESS NOTES
ASSESSMENT AND PLAN:  1. Lumbar Canal Stenosis  Patient denies any new back pain no back pain elicited today.    2. Neuritis of left lower extremity  Patient denies any new pain injuries no falls noted.  Duloxetine to be continued current dose    3. Urge incontinence of urine  Medication for this problem is working no side effects noted.    4. Tension headache  Currently no headaches noted reviewed CT and MRI results no unremarkable findings patient was appraised of disc.  He has not had a headache since being released from the hospital on the ninth.  We will have him follow back in 3 weeks and he will headache diary until that time.    5. Nausea and vomiting in adult  Is been using the meclizine on a scheduled basis and like him to use it only as needed.  Side effects of medication discussed in detail with the patient.            No orders of the defined types were placed in this encounter.    There are no discontinued medications.    No Follow-up on file.    CHIEF COMPLAINT:  Chief Complaint   Patient presents with     Hospital Visit Follow Up     2/9/19 Corazon's, Vertigo/Nausea/Headache       HISTORY OF PRESENT ILLNESS:  Mariusz is a 69 y.o. male with hyperlipidemia, low back pain with lumbar canal stenosis, neuritis of left lower extremity, BPH, and chronic idiopathic constipation, who presents to the clinic today for ED followup. Mariusz is present with a Nettie . He presented to the ED on 02/09/2018 for headache, nausea, and chills, ongoing for two weeks prior. The patient then also mentioned onset of dizziness when his nausea worsens. EKG was normal. Blood work was normal including troponin, magnesium, lipase, CMP, and CBC. Head CT was showed no acute intracranial abnormalities. MRI/MRA brain was negative for posterior circulation stroke. He was discharged with meclizine, and has taken it every day since despite not having dizziness. Mariusz denies any cold prior to onset of his headache. He has been taking  his medications very faithfully. The patient denies any current or recent headache, nausea, or vomiting. He does take six gabapentin per day for neuritis of the left lower extremity.    REVIEW OF SYSTEMS:   General: Negative for fever or chills.  GI: Negative for nausea or emesis.  Neuro: Negative for headache or dizziness.   All other systems are negative.    PFSH:  Reviewed as below.     TOBACCO USE:  Social History     Tobacco Use   Smoking Status Never Smoker   Smokeless Tobacco Current User     Types: Chew   Tobacco Comment    chews betel nut       VITALS:  Vitals:    02/19/19 1134   BP: 134/74   Patient Site: Left Arm   Patient Position: Sitting   Cuff Size: Adult Regular   Pulse: 68   Resp: 16   Temp: 98  F (36.7  C)   TempSrc: Oral   SpO2: 93%   Weight: 144 lb (65.3 kg)   Height: 5' (1.524 m)     Wt Readings from Last 3 Encounters:   02/19/19 144 lb (65.3 kg)   02/09/19 145 lb (65.8 kg)   01/08/19 146 lb 4 oz (66.3 kg)     Body mass index is 28.12 kg/m .    PHYSICAL EXAM:  General: Alert, cooperative, no distress, appears stated age  Head: Normocephalic, without obvious abnormality, atraumatic, nontender to palpation   Eyes: PERRL, conjunctiva/cornea clear, EOM's intact  Nose: Nares normal, septum midline, no drainage or sinus tenderness  Back: Symmetric, no curvature, ROM normal, no CVA tenderness  Lungs: Clear to auscultation bilaterally, respirations unlabored  Heart: Regular rate and rhythm, S1 and S2 normal, no murmur, rub, or gallop  Abdomen: Soft, non tender, bowel sounds active all four quadrants, no masses, no organomegaly.  Neurologic:  A & O x 3.  No tremor, no focal findings.   Normal gait.     DATA REVIEWED:  Additional History from Old Records Summarized (2): Reviewed ED note from 02/09/2019 at which time labs, CT head, MRI/MRA workup were negative for evidence of cardiac abnormalities or stroke.   Decision to Obtain Records (1): none  Radiology Tests Summarized or Ordered (1): 02/09/2019 head  CT was showed no acute intracranial abnormalities, and MRI/MRA brain was negative for posterior circulation stroke.   Labs Reviewed or Ordered (1): 02/09/2019 labs reviewed with negative troponin, magnesium, lipase, CMP, and CBC.  Medicine Test Summarized or Ordered (1): none  Independent Review of EKG or X-RAY(2 each): none    IAviva, am scribing for and in the presence of, Dr. Dyson.    I, Dr. Dyson, personally performed the services described in this documentation, as scribed by Aviva Claros in my presence, and it is both accurate and complete.      MEDICATIONS:  Current Outpatient Medications   Medication Sig Dispense Refill     acetaminophen (TYLENOL) 325 MG tablet Take 2 tablets (650 mg total) by mouth every 6 (six) hours as needed for pain. 30 tablet 0     cetirizine (ZYRTEC) 10 MG tablet TAKE 1 PILL BY MOUTH EVERYDAY FOR ALLERGY 90 tablet 3     diclofenac (VOLTAREN) 50 MG EC tablet Take 1 tablet (50 mg total) by mouth 2 (two) times a day. 60 tablet 11     DULoxetine (CYMBALTA) 60 MG capsule Take 1 capsule (60 mg total) by mouth daily. 30 capsule 9     gabapentin (NEURONTIN) 300 MG capsule Use 3 capsules three days daily 270 capsule 3     meclizine (ANTIVERT) 25 mg tablet Take 1 tablet (25 mg total) by mouth 3 (three) times a day as needed for dizziness. 30 tablet 0     tamsulosin (FLOMAX) 0.4 mg Cp24 TAKE 1 CAPSULE (0.4 MG TOTAL) BY MOUTH DAILY AFTER LUNCH. 30 capsule 11     acetaminophen (TYLENOL) 325 MG tablet Take 2 tablets (650 mg total) by mouth every 6 (six) hours as needed. 100 tablet 12     bromfenac (BROMDAY) 0.09 % ophthalmic solution Apply 2 drops each daily 5 mL 5     diclofenac (VOLTAREN) 50 MG EC tablet TAKE 1 PILL BY MOUTH 2 TIMES EVERYDAY 60 tablet 11     ketorolac (ACULAR LS) 0.4 % Drop APPLY 2 DROPS IN EACH EYE DAILY AS NEEDED 5 mL 1     oxybutynin (DITROPAN XL) 5 MG ER tablet TAKE 1 TABLET (5 MG TOTAL) BY MOUTH DAILY. 90 tablet 2     polyethylene glycol (GLYCOLAX) 17 gram/dose  powder MIX 17 GRAMS IN 8 OZ WATER AND DRINK BY MOUTH DAILY 527 g 5     polyvinyl alcohol (LIQUIFILM TEARS) 1.4 % ophthalmic solution 1 drop as needed for dry eyes.       sodium chloride (OCEAN) 0.65 % nasal spray 1 spray into each nostril as needed for congestion.       VITAMIN D2 50,000 unit capsule TAKE 1 CAPSULE (50,000 UNITS TOTAL) BY MOUTH ONCE A WEEK. 4 capsule 3     No current facility-administered medications for this visit.        Total Data Points: 4    Please note that this clinical encounter uses voice recognition software, there may be typographical errors present

## 2021-06-24 NOTE — TELEPHONE ENCOUNTER
Central PA team  [phone]  Pool: HE PA MED ([id number])          PA has been initiated.       PA form completed and faxed insurance via Cover My Meds     Key:  [id number]        Medication: Bromfenac Sodium (Once-Daily) 0.09% OP SOLN      Insurance: [employer] [facility]          Response will be received via fax and may take up to 5-10 business days depending on plan

## 2021-06-24 NOTE — TELEPHONE ENCOUNTER
Refill Approved    Rx renewed per Medication Renewal Policy. Medication was last renewed on 8/22/18.    Last office visit 2/19/19    Jg Gupta, [facility] Triage/Med Refill 3/7/2019     Requested Prescriptions   Pending Prescriptions Disp Refills     ketorolac (ACULAR LS) 0.4 % Drop [Pharmacy Med Name: KETOROLAC 0.4% OPHTH SOLN 0.4 SOLN] 5 mL 1     Sig: APPLY 2 DROPS IN EACH EYE DAILY AS NEEDED    Opthalmic Allergy Drops Refill Protocol Passed - 3/7/2019  2:35 PM       Passed - Patient has had office visit/physical in last 12 months    Last office visit with prescriber/PCP: 2/19/2019 Shree Dyson MD OR same dept: 2/19/2019 Shree Dyson MD OR same specialty: 2/19/2019 Shree Dyson MD  Last physical: 11/29/2016 Last MTM visit: Visit date not found   Next visit within 3 mo: Visit date not found  Next physical within 3 mo: Visit date not found  Prescriber OR PCP: Shree Dyson MD  Last diagnosis associated with med order: 1. Seasonal allergies  - ketorolac (ACULAR LS) 0.4 % Drop [Pharmacy Med Name: KETOROLAC 0.4% OPHTH SOLN 0.4 SOLN]; APPLY 2 DROPS IN EACH EYE DAILY AS NEEDED  Dispense: 5 mL; Refill: 1    If protocol passes may refill for 12 months if within 3 months of last provider visit (or a total of 15 months).

## 2021-06-25 NOTE — TELEPHONE ENCOUNTER
Patient in clinic, requesting two refills.  Called pharmacy and requested polyethylene glycol and ketotifen eye gtts refilled for .    Provider needs to reorder eye gtts.  CA notified.

## 2021-06-25 NOTE — PROGRESS NOTES
ASSESSMENT and plan   1. Primary osteoarthritis of right knee  Continues have difficulty walking because of knee pain he saw orthopedics approximately 2 months ago and has a follow-up appointment scheduled for tomorrow.  Starting him on Celebrex.  He has seen Dr. Gallardo from [facility] and I understand from a family member that they are seriously considering surgery I will follow up with him in approximately 4 weeks for potential preop evaluation.  - celecoxib (CELEBREX) 200 MG capsule; Take 1 capsule (200 mg total) by mouth daily.  Dispense: 30 capsule; Refill: 2    2. Lumbar Canal Stenosis    Continue duloxetine continue gabapentin and Celebrex added to help with the pain secondary to lumbar canal stenosis.  Side effects discussed in detail  - celecoxib (CELEBREX) 200 MG capsule; Take 1 capsule (200 mg total) by mouth daily.  Dispense: 30 capsule; Refill: 2    3. Benign prostatic hyperplasia (BPH) with urinary urgency    Is having benefit from the Flomax.  Urinary outflow is described as being normal.    4. Neuritis of left lower extremity    Burning and tingling in the left extremity is well controlled with the current dose of gabapentin.    5. Allergic conjunctivitis, bilateral      - ketotifen (ZADITOR/ZYRTEC ITCHY EYES) 0.025 % (0.035 %) ophthalmic solution; Place 1 drop in each eye daily  Dispense: 5 mL; Refill: 5      There are no Patient Instructions on file for this visit.    No orders of the defined types were placed in this encounter.    Medications Discontinued During This Encounter   Medication Reason     ketotifen (ZADITOR/ZYRTEC ITCHY EYES) 0.025 % (0.035 %) ophthalmic solution Reorder       Return in about 4 weeks (around 6/30/2021).    CHIEF COMPLAINT:  Chief Complaint   Patient presents with     Follow-up       HISTORY OF PRESENT ILLNESS:  Mariusz is a 71 y.o. male who is here today for a follow-up for his lumbar canal stenosis resulting neuritis osteoarthritis of right knee allergic  conjunctivitis and BPH.  He is accompanied by his daughter.  He reports that the back pain is about the same but he is having severe difficulty walking from his knee pain he is scheduled to see orthopedics tomorrow and is seriously considering having a knee surgery because he has to walk and rest in 5 minutes cycles his daughter reports that the pain medicine does work for short time.  She reports that he is sleeping well and the pain does not wake him up at night only when he is mobile.  Patient reports that his other medications including medication for spice is working quite well he is running out of his eyedrops in his eyes is sometimes itchy has been rationing the medication until he saw me again.    REVIEW OF SYSTEMS:   Musculoskeletal positive for lumbar back pain referred pain to the right hip and bilateral knee pain.  Neuro positive for numbness and tingling in his left leg and left hip  GI positive for occasional constipation   positive for occasional urinary dribbling  All other systems are negative.    PFSH:      TOBACCO USE:  Social History     Tobacco Use   Smoking Status Never Smoker   Smokeless Tobacco Former User     Types: Chew   Tobacco Comment    Formerly chewed betel nut W/ TOBACCO       VITALS:  Vitals:    06/02/21 0906   BP: 134/76   Patient Site: Left Arm   Patient Position: Sitting   Cuff Size: Adult Regular   Pulse: 76   Temp: 98.2  F (36.8  C)   TempSrc: Oral   SpO2: 95%   Weight: 145 lb 11.2 oz (66.1 kg)     Wt Readings from Last 3 Encounters:   06/02/21 145 lb 11.2 oz (66.1 kg)   01/13/21 148 lb 4 oz (67.2 kg)   11/17/20 144 lb 6 oz (65.5 kg)       PHYSICAL EXAM:    Interactive elderly male sitting in exam room no acute distress  HEENT neck supple mucous membranes moist  Musculoskeletal system tenderness elicited when I palpate the lumbar spine specifically at L4 and L5 forward flexion of his lumbar spine at L3 causes discomfort beyond 30 degrees.  Neuro gross sensation of the left leg  is normal.  Reflexes are bilaterally brisk in the lower extremities      DATA REVIEWED:  Additional History from Old Records Summarized (2): 0  Decision to Obtain Records (1): 0  Radiology Tests Summarized or Ordered (1): 0  Labs Reviewed or Ordered (1): 0  Medicine Test Summarized or Ordered (1): 0  Independent Review of EKG or X-RAY(2 each): 0    The visit lasted a total of 30 minutes     MEDICATIONS:  Current Outpatient Medications   Medication Sig Dispense Refill     cetirizine (ZYRTEC) 10 MG tablet TAKE ONE TABLET daily 90 tablet 1     DULoxetine (CYMBALTA) 60 MG capsule TAKE 1 CAPSULE (60 MG TOTAL) BY MOUTH DAILY. 90 capsule 3     gabapentin (NEURONTIN) 300 MG capsule TAKE 3 PILLS BY MOUTH 3 TIMES DAILY/ TXHUA HNUB NOJ 3 LUB 3 ZAUG 810 capsule 3     ketotifen (ZADITOR/ZYRTEC ITCHY EYES) 0.025 % (0.035 %) ophthalmic solution Place 1 drop in each eye daily 5 mL 5     polyethylene glycol (GLYCOLAX) 17 gram/dose powder MIX 17 GRAMS IN 8 OZ WATER AND DRINK BY MOUTH DAILY 527 g 5     simvastatin (ZOCOR) 20 MG tablet TAKE 1 TABLET (20 MG TOTAL) BY MOUTH EVERY EVENING. 90 tablet 3     tamsulosin (FLOMAX) 0.4 mg cap TAKE 1 CAPSULE (0.4 MG TOTAL) BY MOUTH DAILY AFTER LUNCH. 90 capsule 3     acetaminophen (TYLENOL) 325 MG tablet Take 2 tablets (650 mg total) by mouth every 6 (six) hours as needed. 100 tablet 12     acetaminophen (TYLENOL) 325 MG tablet Take 2 tablets (650 mg total) by mouth every 6 (six) hours as needed for pain. 30 tablet 0     acetaminophen-codeine (TYLENOL #3) 300-30 mg per tablet        celecoxib (CELEBREX) 200 MG capsule Take 1 capsule (200 mg total) by mouth daily. 30 capsule 2     chlorhexidine (PERIDEX) 0.12 % solution        clindamycin (CLEOCIN) 300 MG capsule        ibuprofen (ADVIL,MOTRIN) 600 MG tablet        meloxicam (MOBIC) 15 MG tablet   0     ondansetron (ZOFRAN ODT) 4 MG disintegrating tablet Take 1 tablet (4 mg total) by mouth every 8 (eight) hours as needed for nausea. 12 tablet 0      oxyCODONE-acetaminophen (PERCOCET/ENDOCET) 5-325 mg per tablet Take 1 tablet by mouth every 6 (six) hours as needed for pain. 12 tablet 0     VITAMIN D2 50,000 unit capsule TAKE 1 CAPSULE (50,000 UNITS TOTAL) BY MOUTH ONCE A WEEK. 4 capsule 3     No current facility-administered medications for this visit.

## 2021-06-26 DIAGNOSIS — Z11.59 ENCOUNTER FOR SCREENING FOR OTHER VIRAL DISEASES: ICD-10-CM

## 2021-06-26 NOTE — PROGRESS NOTES
[facility] Care Coordination    Called adult daughter CHAZ You to complete six month assessment and left a message requesting a return call.    Savannah Desai ALEX  [facility]  Phone: [phone]

## 2021-06-27 NOTE — PROGRESS NOTES
Progress Notes by Shree Dyson MD at 3/12/2019  2:20 PM     Author: Shree Dyson MD Service: -- Author Type: Physician    Filed: 3/13/2019  8:01 AM Encounter Date: 3/12/2019 Status: Signed    : Shree Dyson MD (Physician)       MALE PREVENTATIVE EXAM    Assessment and Plan:       1. Seasonal allergies  Continue cetirizine no runny nose itchy eyes noted today.    2. Lumbar Canal Stenosis  Currently he has no back pain today.    3. Routine general medical examination at a health care facility  Anticipatory guidance discussed.    4. Benign prostatic hyperplasia (BPH) with urinary urgency  Symptoms are minimal continue current medication including generic Flomax        Next follow up:  No Follow-up on file.    Immunization Review  Adult Imm Review: No immunizations due today  BMI: 28.29  No tobacco use.    I discussed the following with the patient:   Adult Healthy Living: Importance of regular exercise  Healthy nutrition    Advanced Directives not discussed today.    Subjective:   Chief Complaint: Mariusz Hebert is an 69 y.o. male here for a preventative health visit.     HPI:  Mariusz presents with Nettie . He is doing well overall, and would like his eye drops refilled. The patient continues to struggle with some constipation, and also has urinary leakage every night. No blood in urine or stool. He is sleeping well. No headaches. His teeth hurt. His last dental visit was 2 years ago. Visual changes are denied. His back pain is good today. Mariusz has history of chronic right knee pain, and received injections in the past with only transient pan relief. He is wondering about paperwork for paperwork for the adult  center as they apparently faxed it over this morning.    Healthy Habits  Are you taking a daily aspirin? No  Do you typically exercising at least 40 min, 3-4 times per week?  NO  Do you usually eat at least 4 servings of fruit and vegetables a day, include whole grains and fiber and avoid regularly  "eating high fat foods? Yes  Have you had an eye exam in the past two years? NO  Do you see a dentist twice per year? NO  Do you have any concerns regarding sleep? No    Safety Screen  If you own firearms, are they secured in a locked gun cabinet or with trigger locks? The patient does not own any firearms  Do you feel you are safe where you are living?: Yes (3/12/2019  2:18 PM)  Do you feel you are safe in your relationship(s)?: Yes (3/12/2019  2:18 PM)      Review of Systems:    General: Negative for fatigue, fever, chills, poor appetite, or weight loss/gain.  Eyes: Negative for loss of vision or visual changes.  ENT: Dental pain. Negative for ear pain, nasal congestion, nasal drainage, sinus pain, sinus pressure, or sore throat.  Respiratory: Negative for shortness of breath, cough, or wheezing.  CV: Negative for chest pain.  GI: Constipation. Negative for abdominal pain, nausea, emesis, diarrhea, melena, or hematochezia.  : Urinary incontinence. Negative for dysuria, hematuria, or urinary frequency.  Musculoskeletal: Chronic right knee pain. Negative for neck, back, or other joint or muscle pain.  Neuro: Negative for headache, numbness, or tingling.     Cancer Screening       Status Date      COLONOSCOPY Next Due 9/23/2019      Done 9/23/2009 ENDOSCOPY, COLON          Patient Care Team:  Shree Dyson MD as PCP - General        History     Reviewed By Date/Time Sections Reviewed    Kelle Mason CMA 3/12/2019  2:21 PM Tobacco, Alcohol, Drug Use, Sexual Activity    Kelle Mason CMA 3/12/2019  2:20 PM Tobacco            Objective:   Vital Signs:   Visit Vitals  /72   Pulse 79   Temp 98  F (36.7  C) (Oral)   Resp 14   Ht 4' 11.72\" (1.517 m)   Wt 143 lb 8 oz (65.1 kg)   SpO2 95%   BMI 28.29 kg/m           PHYSICAL EXAM  General: Alert, cooperative, no distress, appears stated age.  Head: Normocephalic, without obvious abnormality, atraumatic.  Eyes: PERRL, conjunctiva/cornea clear, EOM's intact, " fundi benign, both eyes.  Ears: Normal TM's and external ear canals, both ears.  Nose: Nares normal, septum midline, mucosa normal, no drainage or sinus tenderness.  Throat: Lips, mucosa, and tongue normal; gums normal; multiple dental caries.  Back: Symmetric, no curvature, ROM normal, no CVA tenderness.  Lungs: Clear to auscultation bilaterally, respirations unlabored.  Chest wall: No tenderness or deformity.  Heart: Regular rate and rhythm, S1 and S2 normal, no murmur, rub, or gallop.  Abdomen: Soft, non tender, bowel sounds active all four quadrants, no masses, no organomegaly.  : No penile lesions or discharge, no testicular masses or tenderness, no hernias.  Neurologic:  A & O x 3.  No tremor, no focal findings.  Strength testing is normal and symetric both proximally and distally BUE and BLE. Normal gait.      The 10-year ASCVD risk score ([place] CYNTHIA Jr., et al., 2013) is: 16.1%    Values used to calculate the score:      Age: 69 years      Sex: Male      Is Non- : No      Diabetic: No      Tobacco smoker: No      Systolic Blood Pressure: 120 mmHg      Is BP treated: No      HDL Cholesterol: 55 mg/dL      Total Cholesterol: 271 mg/dL         Medication List           Accurate as of 3/12/19  3:04 PM. If you have any questions, ask your nurse or doctor.               CONTINUE taking these medications    * acetaminophen 325 MG tablet  Also known as:  TYLENOL  INSTRUCTIONS:  Take 2 tablets (650 mg total) by mouth every 6 (six) hours as needed.        * acetaminophen 325 MG tablet  Also known as:  TYLENOL  INSTRUCTIONS:  Take 2 tablets (650 mg total) by mouth every 6 (six) hours as needed for pain.        bromfenac 0.09 % ophthalmic solution  Also known as:  BROMDAY  INSTRUCTIONS:  Apply 2 drops each daily        cetirizine 10 MG tablet  Also known as:  ZyrTEC  INSTRUCTIONS:  TAKE 1 PILL BY MOUTH EVERYDAY FOR ALLERGY        * diclofenac 50 MG EC tablet  Also known as:   VOLTAREN  INSTRUCTIONS:  Take 1 tablet (50 mg total) by mouth 2 (two) times a day.        * diclofenac 50 MG EC tablet  Also known as:  VOLTAREN  INSTRUCTIONS:  TAKE 1 PILL BY MOUTH 2 TIMES EVERYDAY        DULoxetine 60 MG capsule  Also known as:  CYMBALTA  INSTRUCTIONS:  Take 1 capsule (60 mg total) by mouth daily.        gabapentin 300 MG capsule  Also known as:  NEURONTIN  INSTRUCTIONS:  Use 3 capsules three days daily        meclizine 25 mg tablet  Also known as:  ANTIVERT  INSTRUCTIONS:  Take 1 tablet (25 mg total) by mouth 3 (three) times a day as needed for dizziness.        oxybutynin 5 MG ER tablet  Also known as:  DITROPAN XL  INSTRUCTIONS:  TAKE 1 TABLET (5 MG TOTAL) BY MOUTH DAILY.        polyethylene glycol 17 gram/dose powder  Also known as:  GLYCOLAX  INSTRUCTIONS:  MIX 17 GRAMS IN 8 OZ WATER AND DRINK BY MOUTH DAILY        polyvinyl alcohol 1.4 % ophthalmic solution  Also known as:  LIQUIFILM TEARS  INSTRUCTIONS:  1 drop as needed for dry eyes.        sodium chloride 0.65 % nasal spray  Also known as:  OCEAN  INSTRUCTIONS:  1 spray into each nostril as needed for congestion.        tamsulosin 0.4 mg Cap  Also known as:  FLOMAX  INSTRUCTIONS:  TAKE 1 CAPSULE (0.4 MG TOTAL) BY MOUTH DAILY AFTER LUNCH.        VITAMIN D2 50,000 unit capsule  INSTRUCTIONS:  TAKE 1 CAPSULE (50,000 UNITS TOTAL) BY MOUTH ONCE A WEEK.  Generic drug:  ergocalciferol            * This list has 4 medication(s) that are the same as other medications prescribed for you. Read the directions carefully, and ask your doctor or other care provider to review them with you.            STOP taking these medications    ketorolac 0.4 % Drop  Also known as:  ACULAR LS  Stopped by:  Shree Dyson MD           Where to Get Your Medications      These medications were sent to [facility] - [place], [place] - [address]  [address], [place] [address]    Phone:  [phone]     bromfenac 0.09 % ophthalmic  solution         Additional Screenings Completed Today:   None    I, Aviva Claros, am scribing for and in the presence of, Dr. Dyson.    I, Dr. Dyson, personally performed the services described in this documentation, as scribed by Aviva Claros in my presence, and it is both accurate and complete.     Please note that this clinical encounter uses voice recognition software, there may be typographical errors present.

## 2021-07-03 NOTE — ADDENDUM NOTE
Addendum Note by Gabrielle Villarreal at 9/13/2019  8:30 AM     Author: Gabrielle Villarreal Service: -- Author Type:     Filed: 9/23/2019  1:18 PM Encounter Date: 9/13/2019 Status: Signed    : Gabrielle Villarreal ()    Addended by: GABRIELLE VILLARREAL on: 9/23/2019 01:18 PM        Modules accepted: Orders

## 2021-07-04 NOTE — H&P (VIEW-ONLY)
Progress Notes by Lorene Vasquez MD at 7/2/2021  7:30 AM     Author: Lorene Vasquez MD Service: -- Author Type: Physician    Filed: 7/2/2021  9:21 AM Encounter Date: 7/2/2021 Status: Signed    : Lorene Vasquez MD (Physician)       [place] [address]  Dept: [phone]  Dept Fax: [phone]  Primary Provider: Lorene Vasquez MD  Pre-op Performing Provider: LORENE VASQUEZ      PREOPERATIVE EVALUATION:  Today's date: 7/2/2021    Mariusz Hebert is a 71 y.o. male who presents for a preoperative evaluation.    Surgical Information:  Surgery/Procedure: Left total knee replacement  Surgery Location: [facility]  Surgeon: Dr. Blankenship  Surgery Date: 07/15/2021  Time of Surgery: N/A  Where patient plans to recover: unknown  Fax number for surgical facility:     Type of Anesthesia Anticipated: Choice    Assessment & Plan      The proposed surgical procedure is considered INTERMEDIATE risk.    Preop general physical exam  And perioperative guidelines discussed with patient and his daughter today.  He understands not to take Celebrex 1 week before surgery he will have Covid testing arranged by [facility].  The exact surgical time has not been decided.  His daughter knows that he should be n.p.o. 8 hours before surgery    Primary osteoarthritis of left knee  Patient has minimal pain while moving he can use Tylenol for pain control 1 week before surgery             RECOMMENDATION:  APPROVAL GIVEN to proceed with proposed procedure, without further diagnostic evaluation.    Review of external notes as documented above           Subjective     HPI related to upcoming procedure:     Preop Questions 7/2/2021   Have you ever had a heart attack or stroke? No   Have you ever had surgery on your heart or blood vessels, such as a stent placement, a coronary artery bypass, or surgery on an artery in your head, neck, heart, or legs? No   Do you have chest pain with activity? No    Do you have a history of  heart failure? No   Do you currently have a cold, bronchitis or symptoms of other infection? No   Do you have a cough, shortness of breath, or wheezing? No   Do you or anyone in your family have previous history of blood clots? No   Do you or does anyone in your family have a serious bleeding problem such as prolonged bleeding following surgeries or cuts? No   Have you ever had problems with anemia or been told to take iron pills? No   Have you had any abnormal blood loss such as black, tarry or bloody stools? No   Have you ever had a blood transfusion? No   Are you willing to have a blood transfusion if it is medically needed before, during, or after your surgery? Yes   Have you or any of your relatives ever had problems with anesthesia? No   Do you have sleep apnea, excessive snoring or daytime drowsiness? No   Do you have any artifical heart valves or other implanted medical devices like a pacemaker, defibrillator, or continuous glucose monitor? No   Do you have artificial joints? No   Are you allergic to latex? No           Review of Systems  CONSTITUTIONAL: NEGATIVE for fever, chills, change in weight  INTEGUMENTARY/SKIN: NEGATIVE for worrisome rashes, moles or lesions  EYES: NEGATIVE for vision changes or irritation  ENT/MOUTH: NEGATIVE for ear, mouth and throat problems  RESP: NEGATIVE for significant cough or SOB  BREAST: NEGATIVE for masses, tenderness or discharge  CV: NEGATIVE for chest pain, palpitations or peripheral edema  GI: NEGATIVE for nausea, abdominal pain, heartburn, or change in bowel habits  : NEGATIVE for frequency, dysuria, or hematuria  MUSCULOSKELETAL: NEGATIVE for significant arthralgias or myalgia  NEURO: NEGATIVE for weakness, dizziness or paresthesias  ENDOCRINE: NEGATIVE for temperature intolerance, skin/hair changes  HEME: NEGATIVE for bleeding problems  PSYCHIATRIC: NEGATIVE for changes in mood or affect    Patient Active Problem List    Diagnosis Date  Noted   ? Sinusitis, unspecified chronicity, unspecified location 09/17/2019   ? Nausea and vomiting, intractability of vomiting not specified, unspecified vomiting type 09/17/2019   ? Pure hypercholesterolemia 01/31/2017   ? Meniscus, lateral, posterior horn derangement, right 06/17/2016   ? Osteoarthritis of right knee 06/17/2016   ? Chondromalacia of knee, right 06/17/2016   ? Pain of right lower extremity 01/22/2016   ? Diverticulosis    ? Benign prostatic hyperplasia (BPH) with urinary urgency    ? Drip Or Drainage Down Throat From Above    ? Constipation    ? Cervicalgia    ? Urinary Incontinence    ? Atopic Dermatitis    ? Lumbar Canal Stenosis    ? Neuritis of left lower extremity    ? Lower Back Pain      Past Medical History:   Diagnosis Date   ? Constipation     Created by Conversion    ? Lower Back Pain     Created by Conversion      Past Surgical History:   Procedure Laterality Date   ? [place] CORTES W/O FACETEC FORAMOT/[facility]KC 1/2 VRT SEG, CERVICAL      Description: Laminectomy Lumbar;  Recorded: 03/31/2011;  Comments: L4 and L5   ? [place] LAP,INGUINAL HERNIA REPR,INITIAL      Description: Laparoscopy Repair Of Initial Inguinal Hernia;  Proc Date: 10/01/2009;  Comments: Dr Arreola     Current Outpatient Medications   Medication Sig Dispense Refill   ? celecoxib (CELEBREX) 200 MG capsule Take 1 capsule (200 mg total) by mouth daily. 30 capsule 2   ? DULoxetine (CYMBALTA) 60 MG capsule TAKE 1 CAPSULE (60 MG TOTAL) BY MOUTH DAILY. 90 capsule 3   ? gabapentin (NEURONTIN) 300 MG capsule TAKE 3 PILLS BY MOUTH 3 TIMES DAILY/ TXHUA HNUB NOJ 3 LUB 3 ZAUG 810 capsule 3   ? simvastatin (ZOCOR) 20 MG tablet TAKE 1 TABLET (20 MG TOTAL) BY MOUTH EVERY EVENING. 90 tablet 3   ? tamsulosin (FLOMAX) 0.4 mg cap TAKE 1 CAPSULE (0.4 MG TOTAL) BY MOUTH DAILY AFTER LUNCH. 90 capsule 3   ? acetaminophen (TYLENOL) 325 MG tablet Take 2 tablets (650 mg total) by mouth every 6 (six) hours as needed. 100 tablet 12   ? acetaminophen (TYLENOL)  "325 MG tablet Take 2 tablets (650 mg total) by mouth every 6 (six) hours as needed for pain. 30 tablet 0   ? acetaminophen-codeine (TYLENOL #3) 300-30 mg per tablet      ? cetirizine (ZYRTEC) 10 MG tablet TAKE ONE TABLET daily 90 tablet 1   ? chlorhexidine (PERIDEX) 0.12 % solution      ? clindamycin (CLEOCIN) 300 MG capsule      ? ibuprofen (ADVIL,MOTRIN) 600 MG tablet      ? ketotifen (ZADITOR/ZYRTEC ITCHY EYES) 0.025 % (0.035 %) ophthalmic solution Place 1 drop in each eye daily 5 mL 5   ? meloxicam (MOBIC) 15 MG tablet   0   ? ondansetron (ZOFRAN ODT) 4 MG disintegrating tablet Take 1 tablet (4 mg total) by mouth every 8 (eight) hours as needed for nausea. 12 tablet 0   ? oxyCODONE-acetaminophen (PERCOCET/ENDOCET) 5-325 mg per tablet Take 1 tablet by mouth every 6 (six) hours as needed for pain. 12 tablet 0   ? polyethylene glycol (GLYCOLAX) 17 gram/dose powder MIX 17 GRAMS IN 8 OZ WATER AND DRINK BY MOUTH DAILY 527 g 5   ? VITAMIN D2 50,000 unit capsule TAKE 1 CAPSULE (50,000 UNITS TOTAL) BY MOUTH ONCE A WEEK. 4 capsule 3     No current facility-administered medications for this visit.        Allergies   Allergen Reactions   ? Tramadol Nausea Only, Dizziness and Headache       Social History     Tobacco Use   ? Smoking status: Never Smoker   ? Smokeless tobacco: Former User     Types: Chew   ? Tobacco comment: Formerly chewed betel nut W/ TOBACCO   Substance Use Topics   ? Alcohol use: No     Frequency: Never      No family history on file.  Social History     Substance and Sexual Activity   Drug Use No        Objective     /68   Pulse 64   Temp 97.6  F (36.4  C) (Oral)   Resp 16   Ht 4' 11.5\" (1.511 m)   Wt 144 lb 2 oz (65.4 kg)   BMI 28.62 kg/m    Physical Exam    GENERAL APPEARANCE: healthy, alert and no distress     EYES: EOMI,  PERRL     HENT: ear canals and TM's normal and nose and mouth without ulcers or lesions     NECK: no adenopathy, no asymmetry, masses, or scars and thyroid normal to " palpation     RESP: lungs clear to auscultation - no rales, rhonchi or wheezes     CV: regular rates and rhythm, normal S1 S2, no S3 or S4 and no murmur, click or rub     ABDOMEN:  soft, nontender, no HSM or masses and bowel sounds normal     MS: extremities normal- no gross deformities noted, no evidence of inflammation in joints, FROM in all extremities.     SKIN: no suspicious lesions or rashes     NEURO: Normal strength and tone, sensory exam grossly normal, mentation intact and speech normal     PSYCH: mentation appears normal. and affect normal/bright     LYMPHATICS: No cervical adenopathy    Recent Labs   Lab Test 01/13/21  0759 09/17/19  1421   HGB  --  13.8*   PLT  --  286    139   K 4.5 3.5   CREATININE 0.99 1.15        PRE-OP Diagnostics:   Labs pending at this time. Results will be reviewed when available.  EKG: appears normal, NSR, Normal Sinus Rhythm            Signed Electronically by: Shree Dyson MD    Copy of this evaluation report is provided to requesting physician.

## 2021-07-06 VITALS
TEMPERATURE: 98.2 F | DIASTOLIC BLOOD PRESSURE: 76 MMHG | OXYGEN SATURATION: 95 % | SYSTOLIC BLOOD PRESSURE: 134 MMHG | HEART RATE: 76 BPM | BODY MASS INDEX: 28.94 KG/M2 | WEIGHT: 145.7 LBS

## 2021-07-12 DIAGNOSIS — Z11.59 ENCOUNTER FOR SCREENING FOR OTHER VIRAL DISEASES: ICD-10-CM

## 2021-07-12 LAB — SARS-COV-2 RNA RESP QL NAA+PROBE: NEGATIVE

## 2021-07-12 PROCEDURE — U0003 INFECTIOUS AGENT DETECTION BY NUCLEIC ACID (DNA OR RNA); SEVERE ACUTE RESPIRATORY SYNDROME CORONAVIRUS 2 (SARS-COV-2) (CORONAVIRUS DISEASE [COVID-19]), AMPLIFIED PROBE TECHNIQUE, MAKING USE OF HIGH THROUGHPUT TECHNOLOGIES AS DESCRIBED BY CMS-2020-01-R: HCPCS

## 2021-07-12 PROCEDURE — U0005 INFEC AGEN DETEC AMPLI PROBE: HCPCS

## 2021-07-13 NOTE — PROVIDER NOTIFICATION
Discharge plan according to [facility]:       07/09/21 1251   Discharge Planning   Patient/Family Anticipates Transition to home with family   Living Arrangements   People in home other relative(s)   Type of Residence Private Residence   Is your private residence a single family home or apartment? Single family home   Once home, are you able to live on one level? Yes   Which level? Lower Level   Bathroom Shower/Tub Tub/Shower unit   Equipment Currently Used at Home none   Support System   Support Systems Family Members   Do you have someone available to stay with you one or two nights once you are home? Yes

## 2021-07-14 RX ORDER — CEFAZOLIN SODIUM 2 G/100ML
2 INJECTION, SOLUTION INTRAVENOUS SEE ADMIN INSTRUCTIONS
Status: DISCONTINUED | OUTPATIENT
Start: 2021-07-15 | End: 2021-07-15 | Stop reason: HOSPADM

## 2021-07-14 RX ORDER — CEFAZOLIN SODIUM 2 G/100ML
2 INJECTION, SOLUTION INTRAVENOUS
Status: COMPLETED | OUTPATIENT
Start: 2021-07-15 | End: 2021-07-15

## 2021-07-15 ENCOUNTER — SURGERY (OUTPATIENT)
Age: 71
End: 2021-07-15
Payer: COMMERCIAL

## 2021-07-15 ENCOUNTER — ANESTHESIA EVENT (OUTPATIENT)
Dept: SURGERY | Facility: CLINIC | Age: 71
End: 2021-07-15
Payer: COMMERCIAL

## 2021-07-15 ENCOUNTER — ANESTHESIA (OUTPATIENT)
Dept: SURGERY | Facility: CLINIC | Age: 71
End: 2021-07-15
Payer: COMMERCIAL

## 2021-07-15 ENCOUNTER — HOSPITAL ENCOUNTER (OUTPATIENT)
Facility: CLINIC | Age: 71
Discharge: HOME | End: 2021-07-16
Attending: ORTHOPAEDIC SURGERY | Admitting: ORTHOPAEDIC SURGERY
Payer: COMMERCIAL

## 2021-07-15 ENCOUNTER — HOSPITAL ENCOUNTER (OUTPATIENT)
Dept: RADIOLOGY | Facility: CLINIC | Age: 71
End: 2021-07-15
Attending: PHYSICIAN ASSISTANT | Admitting: ORTHOPAEDIC SURGERY
Payer: COMMERCIAL

## 2021-07-15 DIAGNOSIS — M17.12 PRIMARY OSTEOARTHRITIS OF LEFT KNEE: Primary | ICD-10-CM

## 2021-07-15 DIAGNOSIS — Z96.652 S/P TOTAL KNEE ARTHROPLASTY, LEFT: ICD-10-CM

## 2021-07-15 LAB
HOLD SPECIMEN: NORMAL
HOLD SPECIMEN: NORMAL
INR PPP: 1.13 (ref 0.85–1.15)

## 2021-07-15 PROCEDURE — 250N000011 HC RX IP 250 OP 636: Performed by: PHYSICIAN ASSISTANT

## 2021-07-15 PROCEDURE — 250N000013 HC RX MED GY IP 250 OP 250 PS 637: Performed by: ANESTHESIOLOGY

## 2021-07-15 PROCEDURE — 999N000141 HC STATISTIC PRE-PROCEDURE NURSING ASSESSMENT: Performed by: ORTHOPAEDIC SURGERY

## 2021-07-15 PROCEDURE — 370N000017 HC ANESTHESIA TECHNICAL FEE, PER MIN: Performed by: ORTHOPAEDIC SURGERY

## 2021-07-15 PROCEDURE — 250N000013 HC RX MED GY IP 250 OP 250 PS 637: Performed by: ORTHOPAEDIC SURGERY

## 2021-07-15 PROCEDURE — 250N000009 HC RX 250: Performed by: NURSE ANESTHETIST, CERTIFIED REGISTERED

## 2021-07-15 PROCEDURE — 272N000001 HC OR GENERAL SUPPLY STERILE: Performed by: ORTHOPAEDIC SURGERY

## 2021-07-15 PROCEDURE — 250N000011 HC RX IP 250 OP 636

## 2021-07-15 PROCEDURE — 250N000011 HC RX IP 250 OP 636: Performed by: ORTHOPAEDIC SURGERY

## 2021-07-15 PROCEDURE — 250N000011 HC RX IP 250 OP 636: Performed by: ANESTHESIOLOGY

## 2021-07-15 PROCEDURE — 250N000009 HC RX 250: Performed by: ANESTHESIOLOGY

## 2021-07-15 PROCEDURE — 250N000009 HC RX 250

## 2021-07-15 PROCEDURE — 258N000001 HC RX 258: Performed by: ORTHOPAEDIC SURGERY

## 2021-07-15 PROCEDURE — C1776 JOINT DEVICE (IMPLANTABLE): HCPCS | Performed by: ORTHOPAEDIC SURGERY

## 2021-07-15 PROCEDURE — 258N000003 HC RX IP 258 OP 636: Performed by: ANESTHESIOLOGY

## 2021-07-15 PROCEDURE — 250N000013 HC RX MED GY IP 250 OP 250 PS 637: Performed by: PHYSICIAN ASSISTANT

## 2021-07-15 PROCEDURE — 278N000051 HC OR IMPLANT GENERAL: Performed by: ORTHOPAEDIC SURGERY

## 2021-07-15 PROCEDURE — 36592 COLLECT BLOOD FROM PICC: CPT | Performed by: ORTHOPAEDIC SURGERY

## 2021-07-15 PROCEDURE — 360N000077 HC SURGERY LEVEL 4, PER MIN: Performed by: ORTHOPAEDIC SURGERY

## 2021-07-15 PROCEDURE — 710N000010 HC RECOVERY PHASE 1, LEVEL 2, PER MIN: Performed by: ORTHOPAEDIC SURGERY

## 2021-07-15 PROCEDURE — 85610 PROTHROMBIN TIME: CPT | Performed by: ORTHOPAEDIC SURGERY

## 2021-07-15 PROCEDURE — 250N000011 HC RX IP 250 OP 636: Performed by: NURSE ANESTHETIST, CERTIFIED REGISTERED

## 2021-07-15 PROCEDURE — 999N000065 XR KNEE PORT LEFT 1/2 VIEWS: Mod: LT

## 2021-07-15 PROCEDURE — 250N000009 HC RX 250: Performed by: ORTHOPAEDIC SURGERY

## 2021-07-15 PROCEDURE — 258N000003 HC RX IP 258 OP 636: Performed by: NURSE ANESTHETIST, CERTIFIED REGISTERED

## 2021-07-15 DEVICE — CRUCIATE RETAINING FEMORAL
Type: IMPLANTABLE DEVICE | Site: KNEE | Status: FUNCTIONAL
Brand: TRIATHLON

## 2021-07-15 DEVICE — BONE CEMENT RADIOPAQUE SIMPLEX HV FULL DOSE 6194-1-001: Type: IMPLANTABLE DEVICE | Site: KNEE | Status: FUNCTIONAL

## 2021-07-15 DEVICE — UNIVERSAL TIBIAL BASEPLATE
Type: IMPLANTABLE DEVICE | Site: KNEE | Status: FUNCTIONAL
Brand: TRIATHLON

## 2021-07-15 DEVICE — PATELLA
Type: IMPLANTABLE DEVICE | Site: KNEE | Status: FUNCTIONAL
Brand: TRIATHLON

## 2021-07-15 DEVICE — TIBIAL BEARING INSERT
Type: IMPLANTABLE DEVICE | Site: KNEE | Status: FUNCTIONAL
Brand: TRIATHLON

## 2021-07-15 RX ORDER — ASPIRIN 81 MG/1
81 TABLET ORAL 2 TIMES DAILY
Qty: 60 TABLET | Refills: 0 | Status: SHIPPED | OUTPATIENT
Start: 2021-07-15 | End: 2024-04-11

## 2021-07-15 RX ORDER — LIDOCAINE 40 MG/G
CREAM TOPICAL
Status: DISCONTINUED | OUTPATIENT
Start: 2021-07-15 | End: 2021-07-15

## 2021-07-15 RX ORDER — POLYETHYLENE GLYCOL 3350 17 G/17G
17 POWDER, FOR SOLUTION ORAL DAILY
Status: DISCONTINUED | OUTPATIENT
Start: 2021-07-16 | End: 2021-07-16 | Stop reason: HOSPADM

## 2021-07-15 RX ORDER — NALOXONE HYDROCHLORIDE 0.4 MG/ML
0.2 INJECTION, SOLUTION INTRAMUSCULAR; INTRAVENOUS; SUBCUTANEOUS
Status: DISCONTINUED | OUTPATIENT
Start: 2021-07-15 | End: 2021-07-16 | Stop reason: HOSPADM

## 2021-07-15 RX ORDER — ONDANSETRON 4 MG/1
4 TABLET, ORALLY DISINTEGRATING ORAL EVERY 30 MIN PRN
Status: DISCONTINUED | OUTPATIENT
Start: 2021-07-15 | End: 2021-07-15 | Stop reason: HOSPADM

## 2021-07-15 RX ORDER — ACETAMINOPHEN 325 MG/1
650 TABLET ORAL EVERY 4 HOURS PRN
Status: DISCONTINUED | OUTPATIENT
Start: 2021-07-18 | End: 2021-07-16 | Stop reason: HOSPADM

## 2021-07-15 RX ORDER — BISACODYL 10 MG
10 SUPPOSITORY, RECTAL RECTAL DAILY PRN
Status: DISCONTINUED | OUTPATIENT
Start: 2021-07-15 | End: 2021-07-16 | Stop reason: HOSPADM

## 2021-07-15 RX ORDER — NALOXONE HYDROCHLORIDE 0.4 MG/ML
0.4 INJECTION, SOLUTION INTRAMUSCULAR; INTRAVENOUS; SUBCUTANEOUS
Status: DISCONTINUED | OUTPATIENT
Start: 2021-07-15 | End: 2021-07-16 | Stop reason: HOSPADM

## 2021-07-15 RX ORDER — HYDROXYZINE HYDROCHLORIDE 10 MG/1
10 TABLET, FILM COATED ORAL EVERY 6 HOURS PRN
Qty: 30 TABLET | Refills: 0 | Status: SHIPPED | OUTPATIENT
Start: 2021-07-15 | End: 2024-04-11

## 2021-07-15 RX ORDER — PROPOFOL 10 MG/ML
INJECTION, EMULSION INTRAVENOUS CONTINUOUS PRN
Status: DISCONTINUED | OUTPATIENT
Start: 2021-07-15 | End: 2021-07-15

## 2021-07-15 RX ORDER — FENTANYL CITRATE-0.9 % NACL/PF 10 MCG/ML
PLASTIC BAG, INJECTION (ML) INTRAVENOUS CONTINUOUS PRN
Status: DISCONTINUED | OUTPATIENT
Start: 2021-07-15 | End: 2021-07-15

## 2021-07-15 RX ORDER — DIPHENHYDRAMINE HCL 12.5MG/5ML
12.5 LIQUID (ML) ORAL EVERY 6 HOURS PRN
Status: DISCONTINUED | OUTPATIENT
Start: 2021-07-15 | End: 2021-07-16 | Stop reason: HOSPADM

## 2021-07-15 RX ORDER — OXYCODONE HCL 5 MG/5 ML
5 SOLUTION, ORAL ORAL EVERY 4 HOURS PRN
Status: CANCELLED | OUTPATIENT
Start: 2021-07-15

## 2021-07-15 RX ORDER — FENTANYL CITRATE 50 UG/ML
50 INJECTION, SOLUTION INTRAMUSCULAR; INTRAVENOUS
Status: DISCONTINUED | OUTPATIENT
Start: 2021-07-15 | End: 2021-07-15

## 2021-07-15 RX ORDER — LIDOCAINE HYDROCHLORIDE 20 MG/ML
INJECTION, SOLUTION INFILTRATION; PERINEURAL PRN
Status: DISCONTINUED | OUTPATIENT
Start: 2021-07-15 | End: 2021-07-15

## 2021-07-15 RX ORDER — MAGNESIUM SULFATE 4 G/50ML
4 INJECTION INTRAVENOUS ONCE
Status: COMPLETED | OUTPATIENT
Start: 2021-07-15 | End: 2021-07-15

## 2021-07-15 RX ORDER — BUPIVACAINE HYDROCHLORIDE 5 MG/ML
INJECTION, SOLUTION EPIDURAL; INTRACAUDAL
Status: COMPLETED | OUTPATIENT
Start: 2021-07-15 | End: 2021-07-15

## 2021-07-15 RX ORDER — LIDOCAINE 40 MG/G
CREAM TOPICAL
Status: DISCONTINUED | OUTPATIENT
Start: 2021-07-15 | End: 2021-07-16 | Stop reason: HOSPADM

## 2021-07-15 RX ORDER — PROCHLORPERAZINE MALEATE 5 MG
5 TABLET ORAL EVERY 6 HOURS PRN
Status: DISCONTINUED | OUTPATIENT
Start: 2021-07-15 | End: 2021-07-16 | Stop reason: HOSPADM

## 2021-07-15 RX ORDER — OXYCODONE HYDROCHLORIDE 5 MG/1
5 TABLET ORAL EVERY 4 HOURS PRN
Status: DISCONTINUED | OUTPATIENT
Start: 2021-07-15 | End: 2021-07-16 | Stop reason: HOSPADM

## 2021-07-15 RX ORDER — SODIUM CHLORIDE, SODIUM LACTATE, POTASSIUM CHLORIDE, CALCIUM CHLORIDE 600; 310; 30; 20 MG/100ML; MG/100ML; MG/100ML; MG/100ML
INJECTION, SOLUTION INTRAVENOUS CONTINUOUS
Status: DISCONTINUED | OUTPATIENT
Start: 2021-07-15 | End: 2021-07-15 | Stop reason: HOSPADM

## 2021-07-15 RX ORDER — GLYCOPYRROLATE 0.2 MG/ML
INJECTION, SOLUTION INTRAMUSCULAR; INTRAVENOUS PRN
Status: DISCONTINUED | OUTPATIENT
Start: 2021-07-15 | End: 2021-07-15

## 2021-07-15 RX ORDER — AMOXICILLIN 250 MG
1-2 CAPSULE ORAL 2 TIMES DAILY
Qty: 30 TABLET | Refills: 1 | Status: SHIPPED | OUTPATIENT
Start: 2021-07-15 | End: 2022-03-02

## 2021-07-15 RX ORDER — AMOXICILLIN 250 MG
1 CAPSULE ORAL 2 TIMES DAILY
Status: DISCONTINUED | OUTPATIENT
Start: 2021-07-15 | End: 2021-07-16 | Stop reason: HOSPADM

## 2021-07-15 RX ORDER — ACETAMINOPHEN 325 MG/1
650 TABLET ORAL EVERY 4 HOURS PRN
Qty: 100 TABLET | Refills: 0 | Status: SHIPPED | OUTPATIENT
Start: 2021-07-15

## 2021-07-15 RX ORDER — HYDROXYZINE HYDROCHLORIDE 10 MG/1
10 TABLET, FILM COATED ORAL EVERY 6 HOURS PRN
Status: DISCONTINUED | OUTPATIENT
Start: 2021-07-15 | End: 2021-07-16 | Stop reason: HOSPADM

## 2021-07-15 RX ORDER — SODIUM CHLORIDE, SODIUM LACTATE, POTASSIUM CHLORIDE, CALCIUM CHLORIDE 600; 310; 30; 20 MG/100ML; MG/100ML; MG/100ML; MG/100ML
INJECTION, SOLUTION INTRAVENOUS CONTINUOUS PRN
Status: DISCONTINUED | OUTPATIENT
Start: 2021-07-15 | End: 2021-07-15

## 2021-07-15 RX ORDER — KETOROLAC TROMETHAMINE 30 MG/ML
15 INJECTION, SOLUTION INTRAMUSCULAR; INTRAVENOUS
Status: DISCONTINUED | OUTPATIENT
Start: 2021-07-15 | End: 2021-07-15 | Stop reason: HOSPADM

## 2021-07-15 RX ORDER — HYDROXYZINE HYDROCHLORIDE 10 MG/1
10 TABLET, FILM COATED ORAL EVERY 6 HOURS PRN
Status: DISCONTINUED | OUTPATIENT
Start: 2021-07-15 | End: 2021-07-15 | Stop reason: HOSPADM

## 2021-07-15 RX ORDER — HYDROMORPHONE HCL IN WATER/PF 6 MG/30 ML
0.1 PATIENT CONTROLLED ANALGESIA SYRINGE INTRAVENOUS
Status: DISCONTINUED | OUTPATIENT
Start: 2021-07-15 | End: 2021-07-16 | Stop reason: HOSPADM

## 2021-07-15 RX ORDER — CEFAZOLIN SODIUM 1 G/3ML
1 INJECTION, POWDER, FOR SOLUTION INTRAMUSCULAR; INTRAVENOUS EVERY 8 HOURS
Status: COMPLETED | OUTPATIENT
Start: 2021-07-15 | End: 2021-07-16

## 2021-07-15 RX ORDER — MAGNESIUM SULFATE HEPTAHYDRATE 40 MG/ML
2 INJECTION, SOLUTION INTRAVENOUS ONCE
Status: CANCELLED | OUTPATIENT
Start: 2021-07-15 | End: 2021-07-15

## 2021-07-15 RX ORDER — ONDANSETRON 2 MG/ML
INJECTION INTRAMUSCULAR; INTRAVENOUS PRN
Status: DISCONTINUED | OUTPATIENT
Start: 2021-07-15 | End: 2021-07-15

## 2021-07-15 RX ORDER — DEXAMETHASONE SODIUM PHOSPHATE 10 MG/ML
INJECTION, SOLUTION INTRAMUSCULAR; INTRAVENOUS PRN
Status: DISCONTINUED | OUTPATIENT
Start: 2021-07-15 | End: 2021-07-15

## 2021-07-15 RX ORDER — ASPIRIN 81 MG/1
81 TABLET ORAL 2 TIMES DAILY
Status: DISCONTINUED | OUTPATIENT
Start: 2021-07-15 | End: 2021-07-16 | Stop reason: HOSPADM

## 2021-07-15 RX ORDER — HALOPERIDOL 5 MG/ML
1 INJECTION INTRAMUSCULAR
Status: DISCONTINUED | OUTPATIENT
Start: 2021-07-15 | End: 2021-07-15 | Stop reason: HOSPADM

## 2021-07-15 RX ORDER — CELECOXIB 200 MG/1
200 CAPSULE ORAL DAILY
COMMUNITY
End: 2021-10-25

## 2021-07-15 RX ORDER — HYDROMORPHONE HYDROCHLORIDE 1 MG/ML
0.3 INJECTION, SOLUTION INTRAMUSCULAR; INTRAVENOUS; SUBCUTANEOUS
Status: DISCONTINUED | OUTPATIENT
Start: 2021-07-15 | End: 2021-07-16 | Stop reason: HOSPADM

## 2021-07-15 RX ORDER — SODIUM CHLORIDE, SODIUM LACTATE, POTASSIUM CHLORIDE, CALCIUM CHLORIDE 600; 310; 30; 20 MG/100ML; MG/100ML; MG/100ML; MG/100ML
INJECTION, SOLUTION INTRAVENOUS CONTINUOUS
Status: DISCONTINUED | OUTPATIENT
Start: 2021-07-15 | End: 2021-07-16 | Stop reason: HOSPADM

## 2021-07-15 RX ORDER — ACETAMINOPHEN 325 MG/1
975 TABLET ORAL EVERY 8 HOURS
Status: DISCONTINUED | OUTPATIENT
Start: 2021-07-15 | End: 2021-07-16 | Stop reason: HOSPADM

## 2021-07-15 RX ORDER — HYDROMORPHONE HCL IN WATER/PF 6 MG/30 ML
0.2 PATIENT CONTROLLED ANALGESIA SYRINGE INTRAVENOUS EVERY 5 MIN PRN
Status: DISCONTINUED | OUTPATIENT
Start: 2021-07-15 | End: 2021-07-15 | Stop reason: HOSPADM

## 2021-07-15 RX ORDER — FENTANYL CITRATE 50 UG/ML
50 INJECTION, SOLUTION INTRAMUSCULAR; INTRAVENOUS EVERY 5 MIN PRN
Status: DISCONTINUED | OUTPATIENT
Start: 2021-07-15 | End: 2021-07-15 | Stop reason: HOSPADM

## 2021-07-15 RX ORDER — ONDANSETRON 4 MG/1
4 TABLET, ORALLY DISINTEGRATING ORAL EVERY 6 HOURS PRN
Status: DISCONTINUED | OUTPATIENT
Start: 2021-07-15 | End: 2021-07-16 | Stop reason: HOSPADM

## 2021-07-15 RX ORDER — PROPOFOL 10 MG/ML
INJECTION, EMULSION INTRAVENOUS PRN
Status: DISCONTINUED | OUTPATIENT
Start: 2021-07-15 | End: 2021-07-15

## 2021-07-15 RX ORDER — ONDANSETRON 2 MG/ML
4 INJECTION INTRAMUSCULAR; INTRAVENOUS EVERY 6 HOURS PRN
Status: DISCONTINUED | OUTPATIENT
Start: 2021-07-15 | End: 2021-07-16 | Stop reason: HOSPADM

## 2021-07-15 RX ORDER — ALBUTEROL SULFATE 0.83 MG/ML
2.5 SOLUTION RESPIRATORY (INHALATION) EVERY 4 HOURS PRN
Status: DISCONTINUED | OUTPATIENT
Start: 2021-07-15 | End: 2021-07-15 | Stop reason: HOSPADM

## 2021-07-15 RX ORDER — ONDANSETRON 2 MG/ML
4 INJECTION INTRAMUSCULAR; INTRAVENOUS EVERY 30 MIN PRN
Status: DISCONTINUED | OUTPATIENT
Start: 2021-07-15 | End: 2021-07-15 | Stop reason: HOSPADM

## 2021-07-15 RX ORDER — ACETAMINOPHEN 325 MG/1
975 TABLET ORAL ONCE
Status: COMPLETED | OUTPATIENT
Start: 2021-07-15 | End: 2021-07-15

## 2021-07-15 RX ORDER — MEPERIDINE HYDROCHLORIDE 50 MG/ML
12.5 INJECTION INTRAMUSCULAR; INTRAVENOUS; SUBCUTANEOUS EVERY 5 MIN PRN
Status: DISCONTINUED | OUTPATIENT
Start: 2021-07-15 | End: 2021-07-15 | Stop reason: HOSPADM

## 2021-07-15 RX ORDER — TRANEXAMIC ACID 650 MG/1
1950 TABLET ORAL ONCE
Status: COMPLETED | OUTPATIENT
Start: 2021-07-15 | End: 2021-07-15

## 2021-07-15 RX ORDER — NAPROXEN 250 MG/1
250 TABLET ORAL EVERY 12 HOURS PRN
Status: DISCONTINUED | OUTPATIENT
Start: 2021-07-15 | End: 2021-07-16 | Stop reason: HOSPADM

## 2021-07-15 RX ORDER — MAGNESIUM HYDROXIDE 1200 MG/15ML
LIQUID ORAL PRN
Status: DISCONTINUED | OUTPATIENT
Start: 2021-07-15 | End: 2021-07-15 | Stop reason: HOSPADM

## 2021-07-15 RX ADMIN — SODIUM CHLORIDE, POTASSIUM CHLORIDE, SODIUM LACTATE AND CALCIUM CHLORIDE: 600; 310; 30; 20 INJECTION, SOLUTION INTRAVENOUS at 15:13

## 2021-07-15 RX ADMIN — PHENYLEPHRINE HYDROCHLORIDE 100 MCG: 10 INJECTION INTRAVENOUS at 14:09

## 2021-07-15 RX ADMIN — BUPIVACAINE HYDROCHLORIDE 2.6 ML: 5 INJECTION, SOLUTION EPIDURAL; INTRACAUDAL; PERINEURAL at 13:53

## 2021-07-15 RX ADMIN — PHENYLEPHRINE HYDROCHLORIDE 100 MCG: 10 INJECTION INTRAVENOUS at 15:15

## 2021-07-15 RX ADMIN — PROPOFOL 30 MG: 10 INJECTION, EMULSION INTRAVENOUS at 13:43

## 2021-07-15 RX ADMIN — DOCUSATE SODIUM 50MG AND SENNOSIDES 8.6MG 1 TABLET: 8.6; 5 TABLET, FILM COATED ORAL at 20:29

## 2021-07-15 RX ADMIN — SODIUM CHLORIDE 1000 ML: 900 IRRIGANT IRRIGATION at 14:36

## 2021-07-15 RX ADMIN — TRANEXAMIC ACID 1950 MG: 650 TABLET ORAL at 12:46

## 2021-07-15 RX ADMIN — FENTANYL CITRATE 50 MCG: 50 INJECTION, SOLUTION INTRAMUSCULAR; INTRAVENOUS at 13:14

## 2021-07-15 RX ADMIN — STERILE WATER 1000 ML: 100 IRRIGANT IRRIGATION at 14:37

## 2021-07-15 RX ADMIN — GLYCOPYRROLATE 0.2 MG: 0.2 INJECTION, SOLUTION INTRAMUSCULAR; INTRAVENOUS at 14:00

## 2021-07-15 RX ADMIN — Medication 20 MCG/MIN: at 14:00

## 2021-07-15 RX ADMIN — PHENYLEPHRINE HYDROCHLORIDE 100 MCG: 10 INJECTION INTRAVENOUS at 14:03

## 2021-07-15 RX ADMIN — CEFAZOLIN SODIUM 2 G: 2 INJECTION, SOLUTION INTRAVENOUS at 13:40

## 2021-07-15 RX ADMIN — PROPOFOL 30 MG: 10 INJECTION, EMULSION INTRAVENOUS at 13:52

## 2021-07-15 RX ADMIN — DEXAMETHASONE SODIUM PHOSPHATE 10 MG: 10 INJECTION, SOLUTION INTRAMUSCULAR; INTRAVENOUS at 13:55

## 2021-07-15 RX ADMIN — Medication 100 ML: at 15:07

## 2021-07-15 RX ADMIN — SODIUM CHLORIDE 1000 ML: 900 IRRIGANT IRRIGATION at 14:37

## 2021-07-15 RX ADMIN — ACETAMINOPHEN 975 MG: 325 TABLET ORAL at 21:48

## 2021-07-15 RX ADMIN — SODIUM CHLORIDE: 900 IRRIGANT IRRIGATION at 14:36

## 2021-07-15 RX ADMIN — PHENYLEPHRINE HYDROCHLORIDE 100 MCG: 10 INJECTION INTRAVENOUS at 14:00

## 2021-07-15 RX ADMIN — ONDANSETRON 4 MG: 2 INJECTION INTRAMUSCULAR; INTRAVENOUS at 13:43

## 2021-07-15 RX ADMIN — ASPIRIN 81 MG: 81 TABLET ORAL at 20:29

## 2021-07-15 RX ADMIN — CEFAZOLIN 1 G: 1 INJECTION, POWDER, FOR SOLUTION INTRAMUSCULAR; INTRAVENOUS at 21:49

## 2021-07-15 RX ADMIN — LIDOCAINE HYDROCHLORIDE 10 MG: 20 INJECTION, SOLUTION INFILTRATION; PERINEURAL at 13:43

## 2021-07-15 RX ADMIN — MAGNESIUM SULFATE HEPTAHYDRATE 4 G: 80 INJECTION, SOLUTION INTRAVENOUS at 13:32

## 2021-07-15 RX ADMIN — PHENYLEPHRINE HYDROCHLORIDE 100 MCG: 10 INJECTION INTRAVENOUS at 14:12

## 2021-07-15 RX ADMIN — PHENYLEPHRINE HYDROCHLORIDE 100 MCG: 10 INJECTION INTRAVENOUS at 14:15

## 2021-07-15 RX ADMIN — PHENYLEPHRINE HYDROCHLORIDE 100 MCG: 10 INJECTION INTRAVENOUS at 15:12

## 2021-07-15 RX ADMIN — PHENYLEPHRINE HYDROCHLORIDE 100 MCG: 10 INJECTION INTRAVENOUS at 13:58

## 2021-07-15 RX ADMIN — PHENYLEPHRINE HYDROCHLORIDE 100 MCG: 10 INJECTION INTRAVENOUS at 14:06

## 2021-07-15 RX ADMIN — MIDAZOLAM HYDROCHLORIDE 1 MG: 1 INJECTION, SOLUTION INTRAMUSCULAR; INTRAVENOUS at 13:14

## 2021-07-15 RX ADMIN — SODIUM CHLORIDE, POTASSIUM CHLORIDE, SODIUM LACTATE AND CALCIUM CHLORIDE: 600; 310; 30; 20 INJECTION, SOLUTION INTRAVENOUS at 12:50

## 2021-07-15 RX ADMIN — ACETAMINOPHEN 975 MG: 325 TABLET, FILM COATED ORAL at 13:03

## 2021-07-15 RX ADMIN — PROPOFOL 50 MCG/KG/MIN: 10 INJECTION, EMULSION INTRAVENOUS at 13:43

## 2021-07-15 ASSESSMENT — MIFFLIN-ST. JEOR: SCORE: 1247.07

## 2021-07-15 NOTE — OP NOTE
Operative Report    PATIENT Mariusz Hebert   DATE OF SURGERY:  7/15/2021      PREOPERATIVE DIAGNOSIS   Osteoarthritis of left knee [M17.12].    POSTOPERATIVE DIAGNOSIS   Osteoarthritis of left knee [M17.12].    PROCEDURE PERFORMED   left total knee arthroplasty     IMPLANTS  1. Katy Triathlon CR femoral component, left size 3  2.   Aaron Triathlon universal tibial baseplate, size 3  3.   Aaron Triathlon X3 CS polyethylene, 9mm  4.   Katy Triathalon N2VAC asymmetric patella, 32mm    SURGEON  Sami Blankenship, DO     ASSISTANT   León Mcclure PA-C. PA-C was needed for positioning, draping, retraction/protection of vital structures, assistance with instrumentation and correct implant placement, deep periarticular injection, closure including deep capsular layer and maintaining patient safety throughout the procedure.  Several of these duties can only be performed by a MACHO and not a lesser trained surgical assistant.    ANESTHESIA  Spinal with Adductor Block      ESTIMATED BLOOD LOSS  25 mL    TOURNIQUET TIME:  45 minutes    COMPLICATIONS   None.      FINDINGS: Grade 4 degenerative changes were noted in medial and patellofemoral compartments with exposed bone and multiple osteophytes.  Varus deformity.  5 degree flexion contracture.    Specimens: none    INDICATION   Mariusz Hebert is a 71 year old male who has been followed for left-sided knee pain.  X-rays confirmed severe degenerative osteoarthritis.  Nonoperative treatment measures failed to improved the patient's symptoms. The risks and benefits of further non-operative treatment vs surgical treatment were discussed.  Mariusz Hebert wished to proceed with surgery.  The patient attended a preoperative teaching class for knee replacement.  They were cleared by a medical doctor for joint arthroplasty.    INFORMED CONSENT  Mariusz Hebert was identified in the preoperative holding area and was identified using medical record number, name, and date of birth, all of which were confirmed.  The operative knee was marked using an indelible marker and informed consent was signed. Once again, all risks and benefits as well as alternatives to surgical intervention were discussed with the patient in detail and all the questions were answered. Risks discussed included but were not limited to: persistent pain, infection, bleeding, scarring, stiffness, thromboembolic events, fracture, malalignment/malrotation, severe limb dysfunction, loss of limb, and loss of life. The patient verbally confirmed the consent and wished to proceed with surgery as scheduled.     TECHNIQUE   The anesthesia service then proceeded with regional anesthesia and Mariusz Hebert was taken to the operating room and placed on the operating table in a supine position. A spinal was then performed in the operating room.  A tourniquet was placed high on the thigh.  Care was taken to pad all bony prominences well. A ESME stocking was placed on the contralateral leg.  The operative extremity was then prepped and draped in a standard orthopedic fashion using DuraPrep. A preprocedural timeout was then performed once again confirming the patient's correct identity, correct procedure, correct side, and correct site. In addition, allergy status and required equipment were reviewed. Three independent members of the operating room team agreed on the above-mentioned parameters.  It was also confirmed that the patient received IV antibiotics and TXA per protocol.    At this point the leg was exsanguinated and the tourniquet was inflated.  A standard midline approach to the knee was utilized.  Sharp dissection was performed down to the level of the capsule.   A standard midvastus arthrotomy was performed. The anterior horn of the medial meniscus was resected and a medial release was performed around the tibial plateau. Medial osteophytes were resected.    The knee was extended and patellar fat pad was excised being careful to protect the patellar tendon. The  patellar osteophytes were removed and the patella was turned using towel clips in the soft tissue to secure it.  The patella was sized with a caliper, and an oscillating saw was used to make a flush cut.  The size of the patella was determined, and the lugs holes were drilled.  The patella protector was then placed.     Attention was turned to the femur.  The knee was flexed, and the remnants of the ACL and anterior horn of lateral menisci were resected.  The notch osteophytes were then removed.  A drill was used to access the femoral canal in line with the center of the notch. A 5 degree guide on the flexi-alpesh was impacted flush and pinned.  The distal valgus cut was made using an oscillating saw.      Attention was turned back to the Tibia.  The retractors were placed and the extramedullary guide was assembled.  The slope was checked and the distal aspect was centered on the ankle.  The jig was pinned proximally, and the  was used to determine the depth of resection.  The proximal tibial resection was performed using an oscillating saw being careful to protect the bone block and PCL.  The jig was removed and a reciprocating saw was used to resect the remainder of the bone wafers.  The island was then sculpted using a rongeur. Laminar spreaders were placed with the knee in extension and the menisci were removed both medially and laterally. A 9mm spacer block was placed to check the extension gap for adequate resection of bone and alignment/balance. This was found to be appropriate.    Attention was turned back to the distal femur. A tibial protector was placed. The 3-degree external rotation guide was pinned in place.   Alignment was confirmed using whitesides line. The knee was taken into extension and the anterior femur synovium was minimally resected. Sizing was done in extension, referencing off of the posterior femur. The knee was flexed and the 4-in-1 cutting block was pinned in place.  Careful  attention was paid to protecting the soft tissue, and the anterior, posterior, and chamfer cuts were made. Lamina  was once again placed. The back of the femur was checked for osteophytes and remaining soft tissue, and a curved osteotome was used to remove the bone there, and do a minimal posterior release.      The tibia was exposed, and the tibial tray was pinned in the correct amount of external rotation.  The trial femur was placed.  The 9mm poly trial was inserted into place.  The trial patellar button was inserted as well.  The knee was examined and found to be well balanced in flexion and extension with good PCL tension.  The varus/valgus was stable. There was full extension and good flexion.  The patella tracked well. The femur lug holes were drilled and the femoral trial was removed.    The proximal tibia was exposed and the tibial tray was drilled using the guide. The punch was inserted carefully.  All trials were then removed.    The bony surfaces were irrigated with copious pulse lavage. The cement was mixed and the components were sequentially cemented with standard cement.  The tibia first, followed by the femur and patella.  At each stage, excess cement was meticulously cleared.  The trial poly was inserted and the cement was allowed to polymerize in full extension. At this point, the knee was once again checked for loose cement.  Betadine lavage performed and tourniquet deflated.  Hemostasis obtained.  The final irrigation was performed using pulsatile lavage. A portion of the local injection was placed in the posterior capsule, with care to avoid vascular structures.  The definitive poly insert was placed and secured.      Remaining local injection was injected into the anterior soft tissues. The wound was closed in layers with #1 StrataFix, 0 Vicryl, 2-0 vicryl, and 3-0 monocryl, followed by skin glue. The aquacel dressing was applied. Followed by a full leg ACE wrap.    The patient was  taken to the PACU in stable condition. The case was clean.  All sponge and needle counts were correct at the end of the case.    The patient will be weight bearing as tolerated.  They will receive 24 hours of prophylactic antibiotics as well as DVT prophylaxis with mechanical and chemical means.     Implant Name Type Inv. Item Serial No.  Lot No. LRB No. Used Action   BONE CEMENT RADIOPAQUE SIMPLEX HV FULL DOSE 6194-1-001 - [id number] Cement, Bone BONE CEMENT RADIOPAQUE SIMPLEX HV FULL DOSE 6194-1-001  [employer] [id number] Left 1 Implanted   IMP COMP FEM STRK TRIATHLN CR LT 3 5510-F-301 - [id number] Total Joint Component/Insert IMP COMP FEM STRK TRIATHLN CR LT 3 5510-F-301  [employer] [id number] Left 1 Implanted   IMP COMP PATELLA HOWM ASYM TRI [id number] 5551-L-320 - [id number] Total Joint Component/Insert IMP COMP PATELLA HOWM ASYM TRI [id number] 5551-L-320  [employer] [id number] Left 1 Implanted   IMP INSERT BASEPLATE TIBIAL HOWM TRI 3 5521-B-300 - [id number] Total Joint Component/Insert IMP INSERT BASEPLATE TIBIAL HOWM TRI 3 5521-B-300  [employer] [id number] Left 1 Implanted   Oralia Triathlon Tibial Bearing Insert- CS    [employer] [id number] Left 1 Implanted       Sami Blankenship DO

## 2021-07-15 NOTE — ANESTHESIA POSTPROCEDURE EVALUATION
Patient: Mariusz Hebert    Procedure(s):  LEFT TOTAL KNEE ARTHROPLASTY    Diagnosis:Osteoarthritis of left knee [M17.12]  Diagnosis Additional Information: No value filed.    Anesthesia Type:  Spinal    Note:  Disposition: Admission   Postop Pain Control: Uneventful            Sign Out: Well controlled pain   PONV: No   Neuro/Psych: Uneventful            Sign Out: Acceptable/Baseline neuro status   Airway/Respiratory: Uneventful            Sign Out: Acceptable/Baseline resp. status   CV/Hemodynamics: Uneventful            Sign Out: Acceptable CV status; No obvious hypovolemia; No obvious fluid overload   Other NRE:    DID A NON-ROUTINE EVENT OCCUR? No           Last vitals:  Vitals:    07/15/21 1610 07/15/21 1620 07/15/21 1630   BP: 117/55 115/57 116/61   Pulse: 75 74 69   Resp: 19 15 14   Temp:      SpO2: 97% 99% 98%       Last vitals prior to Anesthesia Care Transfer:  CRNA VITALS  7/15/2021 1512 - 7/15/2021 1612      7/15/2021             Resp Rate (observed):  (!) 4          Electronically Signed By: Nora Rosas MD  July 15, 2021  4:43 PM

## 2021-07-15 NOTE — ANESTHESIA PROCEDURE NOTES
Intrathecal injection Procedure Note  Pre-Procedure   Staff -        Anesthesiologist:  Alan Dela Cruz MD       Performed By: anesthesiologist       Location: OR       Procedure Start/Stop Times: 7/15/2021 1:51 PM and 7/15/2021 1:53 PM       Pre-Anesthestic Checklist: patient identified, IV checked, risks and benefits discussed, informed consent, monitors and equipment checked, pre-op evaluation, at physician/surgeon's request and post-op pain management  Timeout:       Correct Patient: Yes        Correct Procedure: Yes        Correct Site: Yes        Correct Position: Yes   Procedure Documentation  Procedure: intrathecal injection       Patient Position: sitting       Patient Prep/Sterile Barriers: sterile gloves       Skin prep: Chloraprep       Insertion Site: L3-4. (midline approach).       Needle Gauge: 24.        Needle Length (Inches): 3.5        Spinal Needle Type: Pencan       Introducer used      # of attempts: 2 and  # of redirects:     Assessment/Narrative         Paresthesias: No.       CSF fluid: clear.    Medication(s) Administered   0.5% Bupivacaine PF (Intrathecal), 2.6 mL  Medication Administration Time: 7/15/2021 1:53 PM

## 2021-07-15 NOTE — OR NURSING
Total knee arthroplasty bone cuts will be disposed of in a yellow hazard bag at the end of procedure. Surgeon stated the bone did not need to go to pathology.

## 2021-07-15 NOTE — PHARMACY-ADMISSION MEDICATION HISTORY
Pharmacy Note - Admission Medication History    Pertinent Provider Information:      ______________________________________________________________________    Prior To Admission (PTA) med list completed and updated in EMR.       Prior to Admission Medications   Prescriptions Last Dose Informant Patient Reported? Taking?   DULoxetine (CYMBALTA) 60 MG capsule 7/14/2021 at Unknown time  No Yes   Sig: [DULOXETINE (CYMBALTA) 60 MG CAPSULE] TAKE 1 CAPSULE (60 MG TOTAL) BY MOUTH DAILY.   acetaminophen (TYLENOL) 325 MG tablet Past Week at Unknown time  No Yes   Sig: [ACETAMINOPHEN (TYLENOL) 325 MG TABLET] Take 2 tablets (650 mg total) by mouth every 6 (six) hours as needed.   celecoxib (CELEBREX) 200 MG capsule 7/8/2021  Yes Yes   Sig: Take 200 mg by mouth daily   cetirizine (ZYRTEC) 10 MG tablet 7/14/2021 at Unknown time  No Yes   Sig: [CETIRIZINE (ZYRTEC) 10 MG TABLET] TAKE ONE TABLET daily   gabapentin (NEURONTIN) 300 MG capsule 7/14/2021 at Unknown time  No Yes   Sig: [GABAPENTIN (NEURONTIN) 300 MG CAPSULE] TAKE 3 PILLS BY MOUTH 3 TIMES DAILY/ TXHUA HNUB NOJ 3 LUB 3 ZAUG   ketotifen (ZADITOR/ZYRTEC ITCHY EYES) 0.025 % (0.035 %) ophthalmic solution Past Week at Unknown time  No Yes   Sig: [KETOTIFEN (ZADITOR/ZYRTEC ITCHY EYES) 0.025 % (0.035 %) OPHTHALMIC SOLUTION] Place 1 drop in each eye daily   Patient taking differently: Place 1 drop into both eyes daily as needed    simvastatin (ZOCOR) 20 MG tablet 7/8/2021  No Yes   Sig: [SIMVASTATIN (ZOCOR) 20 MG TABLET] TAKE 1 TABLET (20 MG TOTAL) BY MOUTH EVERY EVENING.   tamsulosin (FLOMAX) 0.4 mg cap 7/14/2021 at Unknown time  No Yes   Sig: [TAMSULOSIN (FLOMAX) 0.4 MG CAP] TAKE 1 CAPSULE (0.4 MG TOTAL) BY MOUTH DAILY AFTER LUNCH.      Facility-Administered Medications: None       Information source(s): Patient, daughter and CareEverywhere/SureScripts  Method of interview communication: in-person      Patient was asked about OTC/herbal products specifically.  PTA med list  reflects this.    In the past week, patient estimated taking medication this percent of the time:  greater than 90%.    Allergies were reviewed, assessed, and updated with the patient.      Patient does not anticipate needing any multi-use medications during admission.    The information provided in this note is only as accurate as the sources available at the time of the update(s).    Thank you for the opportunity to participate in the care of this patient.    Bryant Bridges LC  7/15/2021 12:41 PM

## 2021-07-15 NOTE — ANESTHESIA PROCEDURE NOTES
Adductor canal Procedure Note  Pre-Procedure   Staff -        Anesthesiologist:  Alan Dela Cruz MD       Performed By: anesthesiologist       Location: pre-op       Procedure Start/Stop Times: 7/15/2021 1:15 PM and 7/15/2021 1:20 PM       Pre-Anesthestic Checklist: patient identified, IV checked, site marked, risks and benefits discussed, informed consent, monitors and equipment checked, pre-op evaluation, at physician/surgeon's request and post-op pain management  Timeout:       Correct Patient: Yes        Correct Procedure: Yes        Correct Site: Yes        Correct Position: Yes        Correct Laterality: Yes        Site Marked: Yes  Procedure Documentation  Procedure: Adductor canal       Laterality: left       Patient Position: supine       Patient Prep/Sterile Barriers: sterile gloves       Skin prep: Chloraprep       Needle Type: short bevel       Needle Gauge: 20.        Needle Length (Inches): 4        1. Ultrasound was used to identify targeted nerve, plexus, vascular marker, or fascial plane and place a needle adjacent to it in real-time.       2. Ultrasound was used to visualize the spread of anesthetic in close proximity to the above referenced structure.       3. A permanent image is entered into the patient's record.       4. The visualized anatomic structures appeared normal.       5. There were no apparent abnormal pathologic findings.    Assessment/Narrative         The placement was negative for: blood aspirated, painful injection and site bleeding       Paresthesias: No.    Medication(s) Administered   Bupivacaine 0.5% w/ 1:400K Epi (Injection), 20 mL  Medication Administration Time: 7/15/2021 1:20 PM

## 2021-07-15 NOTE — ANESTHESIA CARE TRANSFER NOTE
Patient: Mariusz Hebert    Procedure(s):  LEFT TOTAL KNEE ARTHROPLASTY    Diagnosis: Osteoarthritis of left knee [M17.12]  Diagnosis Additional Information: No value filed.    Anesthesia Type:   Spinal     Note:    Oropharynx: oropharynx clear of all foreign objects  Level of Consciousness: awake  Oxygen Supplementation: face mask  Level of Supplemental Oxygen (L/min / FiO2): 6  Independent Airway: airway patency satisfactory and stable  Dentition: dentition unchanged  Vital Signs Stable: post-procedure vital signs reviewed and stable  Report to RN Given: handoff report given  Patient transferred to: PACU    Handoff Report: Identifed the Patient, Identified the Reponsible Provider, Reviewed the pertinent medical history, Discussed the surgical course, Reviewed Intra-OP anesthesia mangement and issues during anesthesia, Set expectations for post-procedure period and Allowed opportunity for questions and acknowledgement of understanding      Vitals: (Last set prior to Anesthesia Care Transfer)  CRNA VITALS  7/15/2021 1512 - 7/15/2021 1547      7/15/2021             Resp Rate (observed):  (!) 4        Electronically Signed By: RICHARD Rodriguez CRNA  July 15, 2021  3:47 PM

## 2021-07-15 NOTE — PROGRESS NOTES
Patient has 2 WW bags and a walker.  Daughter Josh El confirmed glasses were in one of the WW bags.

## 2021-07-15 NOTE — ANESTHESIA PREPROCEDURE EVALUATION
Anesthesia Pre-Procedure Evaluation    Patient: Mariusz Hebert   MRN: [id number] : 1950        Preoperative Diagnosis: Osteoarthritis of left knee [M17.12]   Procedure : Procedure(s):  LEFT TOTAL KNEE ARTHROPLASTY     Past Medical History:   Diagnosis Date     Arthritis      Cataracts, bilateral      Lumbago     Created by Conversion      Unspecified constipation     Created by Conversion       Past Surgical History:   Procedure Laterality Date     C CORTES W/O FACETEC FORAMOT/[facility]  VRT SEG, CERVICAL      Description: Laminectomy Lumbar;  Recorded: 2011;  Comments: L4 and L5     IR LUMBAR EPIDURAL STEROID INJECTION  2012     IR LUMBAR EPIDURAL STEROID INJECTION  3/15/2012     DC LAP,INGUINAL HERNIA REPR,INITIAL      Description: Laparoscopy Repair Of Initial Inguinal Hernia;  Proc Date: 10/01/2009;  Comments: Dr Arreola      Allergies   Allergen Reactions     Tramadol Nausea, Dizziness and Headache      Social History     Tobacco Use     Smoking status: Never Smoker     Smokeless tobacco: Never Used   Substance Use Topics     Alcohol use: Not Currently      Wt Readings from Last 1 Encounters:   07/15/21 64 kg (141 lb)        Anesthesia Evaluation   Pt has had prior anesthetic.         ROS/MED HX  ENT/Pulmonary:  - neg pulmonary ROS     Neurologic:  - neg neurologic ROS     Cardiovascular:  - neg cardiovascular ROS     METS/Exercise Tolerance:     Hematologic:  - neg hematologic  ROS     Musculoskeletal:  - neg musculoskeletal ROS     GI/Hepatic:  - neg GI/hepatic ROS     Renal/Genitourinary:  - neg Renal ROS     Endo:  - neg endo ROS     Psychiatric/Substance Use:  - neg psychiatric ROS     Infectious Disease:  - neg infectious disease ROS     Malignancy:  - neg malignancy ROS     Other:            Physical Exam    Airway  airway exam normal           Respiratory Devices and Support         Dental  no notable dental history         Cardiovascular   cardiovascular exam normal          Pulmonary    pulmonary exam normal                OUTSIDE LABS:  CBC:   Lab Results   Component Value Date    WBC 7.3 07/02/2021    WBC 7.8 09/17/2019    HGB 14.2 07/02/2021    HGB 13.8 (L) 09/17/2019    HCT 44.9 07/02/2021    HCT 44.5 09/17/2019     07/02/2021     09/17/2019     BMP:   Lab Results   Component Value Date     07/02/2021     01/13/2021    POTASSIUM 4.8 07/02/2021    POTASSIUM 4.5 01/13/2021    CHLORIDE 101 07/02/2021    CHLORIDE 104 01/13/2021    CO2 28 07/02/2021    CO2 26 01/13/2021    BUN 14 07/02/2021    BUN 18 01/13/2021    CR 0.89 07/02/2021    CR 0.99 01/13/2021    GLC 92 07/02/2021     01/13/2021     COAGS: No results found for: PTT, INR, FIBR  POC: No results found for: BGM, HCG, HCGS  HEPATIC:   Lab Results   Component Value Date    ALBUMIN 3.1 (L) 09/17/2019    PROTTOTAL 7.0 09/17/2019    ALT 20 09/17/2019    AST 18 09/17/2019    ALKPHOS 67 09/17/2019    BILITOTAL 0.5 09/17/2019     OTHER:   Lab Results   Component Value Date    DEIDRE 9.4 07/02/2021    MAG 1.9 09/17/2019    LIPASE <9 09/17/2019    SED 28 (H) 09/23/2019       Anesthesia Plan    ASA Status:  2      Anesthesia Type: Spinal.              Consents    Anesthesia Plan(s) and associated risks, benefits, and realistic alternatives discussed. Questions answered and patient/representative(s) expressed understanding.     - Discussed with:  Patient         Postoperative Care    Pain management: IV analgesics, Oral pain medications.        Comments:                Alan Dela Cruz MD

## 2021-07-16 ENCOUNTER — PATIENT OUTREACH (OUTPATIENT)
Dept: GERIATRIC MEDICINE | Facility: CLINIC | Age: 71
End: 2021-07-16

## 2021-07-16 ENCOUNTER — APPOINTMENT (OUTPATIENT)
Dept: OCCUPATIONAL THERAPY | Facility: CLINIC | Age: 71
End: 2021-07-16
Attending: ORTHOPAEDIC SURGERY
Payer: COMMERCIAL

## 2021-07-16 ENCOUNTER — APPOINTMENT (OUTPATIENT)
Dept: PHYSICAL THERAPY | Facility: CLINIC | Age: 71
End: 2021-07-16
Attending: ORTHOPAEDIC SURGERY
Payer: COMMERCIAL

## 2021-07-16 VITALS
HEIGHT: 60 IN | WEIGHT: 141 LBS | TEMPERATURE: 98.9 F | DIASTOLIC BLOOD PRESSURE: 73 MMHG | OXYGEN SATURATION: 97 % | BODY MASS INDEX: 27.68 KG/M2 | RESPIRATION RATE: 17 BRPM | HEART RATE: 63 BPM | SYSTOLIC BLOOD PRESSURE: 127 MMHG

## 2021-07-16 LAB
FASTING STATUS PATIENT QL REPORTED: YES
GLUCOSE BLD-MCNC: 157 MG/DL (ref 70–125)
HGB BLD-MCNC: 12.3 G/DL (ref 13.3–17.7)

## 2021-07-16 PROCEDURE — 97165 OT EVAL LOW COMPLEX 30 MIN: CPT | Mod: GO

## 2021-07-16 PROCEDURE — 250N000013 HC RX MED GY IP 250 OP 250 PS 637: Performed by: PHYSICIAN ASSISTANT

## 2021-07-16 PROCEDURE — 97530 THERAPEUTIC ACTIVITIES: CPT | Mod: GO

## 2021-07-16 PROCEDURE — 85018 HEMOGLOBIN: CPT | Performed by: PHYSICIAN ASSISTANT

## 2021-07-16 PROCEDURE — 82947 ASSAY GLUCOSE BLOOD QUANT: CPT | Performed by: ORTHOPAEDIC SURGERY

## 2021-07-16 PROCEDURE — 97535 SELF CARE MNGMENT TRAINING: CPT | Mod: GO

## 2021-07-16 PROCEDURE — 97110 THERAPEUTIC EXERCISES: CPT | Mod: GP

## 2021-07-16 PROCEDURE — 250N000011 HC RX IP 250 OP 636: Performed by: PHYSICIAN ASSISTANT

## 2021-07-16 PROCEDURE — 36415 COLL VENOUS BLD VENIPUNCTURE: CPT | Performed by: PHYSICIAN ASSISTANT

## 2021-07-16 PROCEDURE — 97116 GAIT TRAINING THERAPY: CPT | Mod: GP

## 2021-07-16 PROCEDURE — 97161 PT EVAL LOW COMPLEX 20 MIN: CPT | Mod: GP

## 2021-07-16 RX ORDER — CETIRIZINE HYDROCHLORIDE 10 MG/1
10 TABLET ORAL DAILY
Status: DISCONTINUED | OUTPATIENT
Start: 2021-07-16 | End: 2021-07-16 | Stop reason: HOSPADM

## 2021-07-16 RX ORDER — OXYCODONE HYDROCHLORIDE 5 MG/1
2.5-5 TABLET ORAL EVERY 4 HOURS PRN
Qty: 30 TABLET | Refills: 0 | Status: SHIPPED | OUTPATIENT
Start: 2021-07-16 | End: 2023-06-06 | Stop reason: ALTCHOICE

## 2021-07-16 RX ORDER — GABAPENTIN 300 MG/1
900 CAPSULE ORAL 3 TIMES DAILY
Status: DISCONTINUED | OUTPATIENT
Start: 2021-07-16 | End: 2021-07-16 | Stop reason: HOSPADM

## 2021-07-16 RX ORDER — SIMVASTATIN 20 MG
20 TABLET ORAL EVERY EVENING
Status: DISCONTINUED | OUTPATIENT
Start: 2021-07-16 | End: 2021-07-16 | Stop reason: HOSPADM

## 2021-07-16 RX ORDER — DULOXETIN HYDROCHLORIDE 60 MG/1
60 CAPSULE, DELAYED RELEASE ORAL DAILY
Status: DISCONTINUED | OUTPATIENT
Start: 2021-07-16 | End: 2021-07-16 | Stop reason: HOSPADM

## 2021-07-16 RX ORDER — TAMSULOSIN HYDROCHLORIDE 0.4 MG/1
0.4 CAPSULE ORAL DAILY
Status: DISCONTINUED | OUTPATIENT
Start: 2021-07-16 | End: 2021-07-16 | Stop reason: HOSPADM

## 2021-07-16 RX ADMIN — DOCUSATE SODIUM 50MG AND SENNOSIDES 8.6MG 1 TABLET: 8.6; 5 TABLET, FILM COATED ORAL at 10:31

## 2021-07-16 RX ADMIN — ACETAMINOPHEN 975 MG: 325 TABLET ORAL at 05:00

## 2021-07-16 RX ADMIN — CETIRIZINE HYDROCHLORIDE 10 MG: 10 TABLET, FILM COATED ORAL at 10:31

## 2021-07-16 RX ADMIN — GABAPENTIN 900 MG: 300 CAPSULE ORAL at 10:31

## 2021-07-16 RX ADMIN — CEFAZOLIN 1 G: 1 INJECTION, POWDER, FOR SOLUTION INTRAMUSCULAR; INTRAVENOUS at 05:01

## 2021-07-16 RX ADMIN — ASPIRIN 81 MG: 81 TABLET ORAL at 10:31

## 2021-07-16 RX ADMIN — DULOXETINE HYDROCHLORIDE 60 MG: 60 CAPSULE, DELAYED RELEASE ORAL at 10:31

## 2021-07-16 RX ADMIN — OXYCODONE HYDROCHLORIDE 5 MG: 5 TABLET ORAL at 10:32

## 2021-07-16 NOTE — PROGRESS NOTES
TRANSITIONS OF CARE (DICKSON) LOG   DICKSON tasks should be completed by the CC within one (1) business day of notification of each transition. Follow up contact with member is required after return to their usual care setting.  Note:  If CC finds out about the transitions fifteen (15) days or more after the member has returned to their usual care setting, no DICKSON log is needed. However, the CC should check in with the member to discuss the transition process, any changes needed to the care plan and document it in a case note.    Member Name:  Mariusz Hebret MCO Name:  Jayjay MCO/Health Plan Member ID#: [id number]   Product: MSC+ Care Coordinator Contact:  Savannah Desai, \[facility] [place] [facility]\"" Agency/County/Care System: [facility]   Transition Communication Actions from Care Management Contact   Transition #1   Notification Date: 07/16/21 Transition Date:   07/15/21 Transition From: Home     Is this the member s usual care setting?               yes Transition To: Hospital, [facility]   Transition Type:  Planned  Reason for Admission/Comments:  CC contacted Hospital /discharge planner via the [email or web address] Transitional Care Hand-In Process, with community care plan included.  CC reached out to member regarding transition and offered support as needed.  Reviewed and update care plan as needed.  Notified community service providers and placed services None on hold as needed.  Transition log initiated.   PCP notified of hospitalization via EMR.       Shared CC contact info, care plan/services with receiving setting--Date completed: 07/16/21   Notified PCP of transition--Date completed:  07/1/21     via  EMR   Transition #2   Transition #3  (if applicable)   Notification Date: 07/16/21         Transition To:  Home  Transition Date: 07/16/21     Transition Type:    Planned  Notified PCP -- Date completed: 07/16/21              Shared CC contact info, care plan/services with receiving setting or, if applicable, home care agency--Date completed:   07/16/21  *Complete additional tasks below, if this transition is a return to usual care setting.      Comments:    CC contacted member and reviewed discharge summary.  Member has a follow-up appointment with PCP in 7 days: Yes: scheduled on First week of Aug   Member has had a change in condition: No  Home visit needed: No  Care plan reviewed and updated.  The following home based services None were resumed.  New referrals placed: No  Transition log completed.   PCP notified of transition back to home via EMR.    Care Coordinator spoke to Mariusz.   He had a planned knee replacement.  He is doing well and in pain but managing.     Notification Date:        Transition To:    Transition Date:              Transition Type:      Notified PCP--Date completed:          Shared CC contact info, care plan/services with receiving setting or, if applicable, home care agency--Date completed:       *Complete additional tasks below, if this transition is a return to usual care setting.      Comments:       *Complete tasks below when the member is discharging TO their usual care setting within one (1) business day of notification.  For situations where the Care Coordinator is notified of the discharge prior to the date of discharge, the Care Coordinator must follow up with the member or designated representative to confirm that discharge actually occurred and discuss required DICKSON tasks as outlined in the DICKSON Instructions.  (This includes situations where it may be a  new  usual care setting for the member. (i.e., a community member who decides upon permanent nursing home placement following hospitalization and rehab).    Date completed: 07/16/21  Communicated with member or their designated representative about the following:  care transition process; about changes to the member s health status; plan of care updates; education about transitions and how to prevent unplanned transitions/readmissions  Four Pillars for Optimal  Transition:    Check  Yes  - if the member, family member and/or SNF/facility staff manages the following:    If  No  provide explanation in the comments section.          [x]  Yes     []  No     Does the member have a follow-up appointment scheduled with primary care or specialist? (Mental health hospitalizations--the appt. should be w/in 7 days)   [x]  Yes     []  No     Can the member manage their medications or is there a system in place to manage medications (e.g. home care set-up)?         [x]  Yes     []  No     Can the member verbalize warning signs and symptoms to watch for and how to respond?         [x]  Yes     []  No     Does the member use a Personal Health Care Record?  Check  Yes  if visit summary, discharge summary, and/or healthcare summary are being used as a PHR.                                                                                                                                                                                    [x] Yes      [] No      Have you updated the member s care plan?  If  No  provide explanation in comments.   Comments:      JANNETH Gonzalez  [employer]  Phone: [phone]

## 2021-07-16 NOTE — PLAN OF CARE
Problem: Mobility Impairment (Orthopaedic Rehabilitation)  Goal: Optimal Mobility [place] and Safety  Outcome: Improving     Problem: Adult Inpatient Plan of Care  Goal: Absence of Hospital-Acquired Illness or Injury  Intervention: Prevent and Manage VTE (Venous Thromboembolism) Risk  Recent Flowsheet Documentation  Taken 7/15/2021 1730 by Jonathan Mason RN  VTE Prevention/Management: foot pump device on     Problem: Adult Inpatient Plan of Care  Goal: Absence of Hospital-Acquired Illness or Injury  Intervention: Identify and Manage Fall Risk  Recent Flowsheet Documentation  Taken 7/15/2021 1925 by Jonathan Msaon RN  Safety Promotion/Fall Prevention:   activity supervised   safety round/check completed    POD 0; Arrived to floor at 1530. AVSS on RA. Neuros intact. ACE C/D/I. Denies pain. Daughter at besides and interpreting for pt. LR at 75ml/hr. Appetite good; eating food from home. Due to void and ambulate.    Plan: Continue to monitor and support POC. Discharge home tomorrow.     Patient vital signs are at baseline: Yes  Patient able to ambulate as they were prior to admission or with assist devices provided by therapies during their stay:  No,  Reason:  due to ambulate  Patient MUST void prior to discharge:  No,  Reason:  due to void  Patient able to tolerate oral intake:  Yes  Pain has adequate pain control using Oral analgesics:  Yes

## 2021-07-16 NOTE — PLAN OF CARE
Patient vital signs are at baseline: Yes  Patient able to ambulate as they were prior to admission or with assist devices provided by therapies during their stay:  Yes  Patient MUST void prior to discharge:  Yes  Patient able to tolerate oral intake:  Yes  Pain has adequate pain control using Oral analgesics:  Yes    CMS intact. Ace wrap CDI. Up with SBA and walker for transfers and ambulation. Pain managed with scheduled medications, essential oils, ice therapy, and rest. Plans to discharge home 7/16 pending clinical progression. Will continue to monitor and follow plan of care.     Problem: Mobility Impairment (Orthopaedic Rehabilitation)  Goal: Optimal Mobility [place] and Safety  7/16/2021 0357 by Tommy Nair RN  Outcome: Improving    Problem: Pain Acute  Goal: Acceptable Pain Control and Functional Ability  Outcome: Improving  Intervention: Develop Pain Management Plan  Recent Flowsheet Documentation  Taken 7/16/2021 0021 by Tommy Nair RN  Pain Management Interventions:   aromatherapy   emotional support   rest   therapeutic presence  Taken 7/15/2021 2030 by Tommy Nair RN  Pain Management Interventions: (Declines PRN medication at this time.)   aromatherapy   emotional support   rest   therapeutic presence  Intervention: Prevent or Manage Pain  Recent Flowsheet Documentation  Taken 7/16/2021 0021 by Tommy Nair RN  Complementary Therapy: aromatherapy utilized  Taken 7/15/2021 2030 by Tommy Nair RN  Complementary Therapy: aromatherapy utilized

## 2021-07-16 NOTE — DISCHARGE SUMMARY
Orthopedic Discharge Summary    PINO Winchester 1950, MRN [id number]    Admission Date: 7/15/2021  Admission Diagnoses: Osteoarthritis of left knee [M17.12]  Primary osteoarthritis of left knee [M17.12]     Discharge Date: 2021     Post-operative Day:  1 Day Post-Op    Reason for Admission: The patient was admitted for the following:  L TKA    Brief Hospital Course: This 71 year old male underwent the aforementioned procedure with Dr. Blankenship on 7/15/21. There were no intraoperative complications and the patient was transferred to the recovery room and later the orthopedic unit in stable condition. Once the patient reached the orthopedic floor our orthopedic pain protocol was implemented along with the following:  Therapy: PT/OT  Anticoagulation Medications: ASA 81 mg bid     Complications during admission: none  Consultations during admission: Hospitalist service for medical management     Pertinent Results at Discharge:    Hemoglobin   Date/Time Value Ref Range Status   2021 05:53 AM 12.3 (L) 13.3 - 17.7 g/dL Final   2021 08:10 AM 14.2 14.0 - 18.0 g/dL Final   2019 02:21 PM 13.8 (L) 14.0 - 18.0 g/dL Final   2011 05:06 PM 14.6 13.3 - 17.7 g/dl Final     INR   Date/Time Value Ref Range Status   07/15/2021 12:06 PM 1.13 0.85 - 1.15 Final     Comment:     Effective 2021, the reference range for this assay has changed.     Platelet Count   Date/Time Value Ref Range Status   2021 08:10  140 - 440 thou/uL Final   2019 02:21  140 - 440 thou/uL Final   2019 09:43  140 - 440 thou/uL Final     /73 (BP Location: Left arm)   Pulse 63   Temp 98.9  F (37.2  C) (Oral)   Resp 17   Ht 1.524 m (5')   Wt 64 kg (141 lb)   SpO2 97%   BMI 27.54 kg/m      Active Problems:    Primary osteoarthritis of left knee      Discharge Information:  Condition at discharge: improving  Discharge destination: home    Medications at discharge:    Mariusz Hebert   Home  Medication Instructions [facility]:[id number]    Printed on:07/16/21 9221   Medication Information                      acetaminophen (TYLENOL) 325 MG tablet  [ACETAMINOPHEN (TYLENOL) 325 MG TABLET] Take 2 tablets (650 mg total) by mouth every 6 (six) hours as needed.             acetaminophen (TYLENOL) 325 MG tablet  Take 2 tablets (650 mg) by mouth every 4 hours as needed for other (mild pain)             aspirin 81 MG EC tablet  Take 1 tablet (81 mg) by mouth 2 times daily             celecoxib (CELEBREX) 200 MG capsule  Take 200 mg by mouth daily             cetirizine (ZYRTEC) 10 MG tablet  [CETIRIZINE (ZYRTEC) 10 MG TABLET] TAKE ONE TABLET daily             DULoxetine (CYMBALTA) 60 MG capsule  [DULOXETINE (CYMBALTA) 60 MG CAPSULE] TAKE 1 CAPSULE (60 MG TOTAL) BY MOUTH DAILY.             gabapentin (NEURONTIN) 300 MG capsule  [GABAPENTIN (NEURONTIN) 300 MG CAPSULE] TAKE 3 PILLS BY MOUTH 3 TIMES DAILY/ TXALANAA HNUB NOJ 3 LUB 3 ZAUG             hydrOXYzine (ATARAX) 10 MG tablet  Take 1 tablet (10 mg) by mouth every 6 hours as needed for itching or anxiety (with pain, moderate pain)             ketotifen (ZADITOR/ZYRTEC ITCHY EYES) 0.025 % (0.035 %) ophthalmic solution  [KETOTIFEN (ZADITOR/ZYRTEC ITCHY EYES) 0.025 % (0.035 %) OPHTHALMIC SOLUTION] Place 1 drop in each eye daily             oxyCODONE (ROXICODONE) 5 MG tablet  Take 0.5-1 tablets (2.5-5 mg) by mouth every 4 hours as needed for moderate to severe pain             senna-docusate (SENOKOT-S/PERICOLACE) 8.6-50 MG tablet  Take 1-2 tablets by mouth 2 times daily Take while on oral narcotics to prevent or treat constipation.             simvastatin (ZOCOR) 20 MG tablet  [SIMVASTATIN (ZOCOR) 20 MG TABLET] TAKE 1 TABLET (20 MG TOTAL) BY MOUTH EVERY EVENING.             tamsulosin (FLOMAX) 0.4 mg cap  [TAMSULOSIN (FLOMAX) 0.4 MG CAP] TAKE 1 CAPSULE (0.4 MG TOTAL) BY MOUTH DAILY AFTER LUNCH.                 Bracing: none    Activity: WBAT    Subjective     Patient  states he is doing well. Pain is minimal. Has passed therapies. Wants to go home. Denies chest pain, sob, calf pain.    The patient is A&Ox3.  Sensation is intact.  Dorsiflexion and plantar flexion is intact.  Dorsalis pedis pulse intact.  Calves are soft and non-tender.   The incision is covered. Dressing C/D/I      Follow-up Care:  The patient will be followed in our office in 2 weeks or sooner should the need arise.  Patient should follow up with their PCP as directed.  Patient was seen by myself on the date of discharge.    Huy Back PA-C, MACHO  Date: 7/16/2021  Time: 1:52 PM

## 2021-07-16 NOTE — PROGRESS NOTES
Occupational Therapy     07/16/21 0828   Quick Adds   Type of Visit Initial Occupational Therapy Evaluation       Present yes   Living Environment   Current Living Arrangements house  (2 level house, full flight with railing)   Home Accessibility no concerns   Transportation Anticipated family or friend will provide   Self-Care   Usual Activity Tolerance good   Current Activity Tolerance good   Equipment Currently Used at Home walker, rolling;cane, straight;walker, standard   Activity/Exercise/Self-Care Comment PCA BID to assist with all ADLs   Instrumental Activities of Daily Living (IADL)   IADL Comments A with meal prep, cleaning, meds, finances, driving, shopping   General Information   Onset of Illness/Injury or Date of Surgery 07/15/21   Referring Physician marcos barrios   Patient/Family Therapy Goal Statement (OT) go home   Additional Occupational Profile Info/Pertinent History of Current Problem low complexity   Existing Precautions/Restrictions no known precautions/restrictions   Left Lower Extremity (Weight-bearing Status) weight-bearing as tolerated (WBAT)   Cognitive Status Examination   Orientation Status orientation to person, place and time   Affect/Mental Status (Cognitive) WNL   Follows Commands WNL   Pain Assessment   Patient Currently in Pain No   Bed Mobility   Bed Mobility supine-sit;sit-supine   Supine-Sit [place] (Bed Mobility) modified independence   Sit-Supine [place] (Bed Mobility) modified independence   Transfers   Transfers bed-chair transfer;sit-stand transfer;toilet transfer;shower transfer   Transfer Skill: Bed to Chair/Chair to Bed   Bed-Chair [place] (Transfers) modified independence   Assistive Device (Bed-Chair Transfers) standard walker   Sit-Stand Transfer   Sit-Stand [place] (Transfers) modified independence   Assistive Device (Sit-Stand Transfers) walker, standard   Shower Transfer   Type (Shower Transfer) stand pivot/stand step    [place] (Shower Transfer) modified independence;verbal cues   Assistive Device (Shower Transfer) shower chair   Toilet Transfer   Type (Toilet Transfer) sit-stand   [place] (Toilet Transfer) modified independence   Assistive Device (Toilet Transfer) walker, standard;raised toilet seat   Activities of Daily Living   BADL Assessment   (PCA BID, A with dressing, toileting, grooming)   Clinical Impression   Criteria for Skilled Therapeutic Interventions Met (OT) yes   OT Diagnosis Decreased ADL independence   OT Problem List-Impairments impacting ADL problems related to;balance;pain;strength   ADL comments/analysis Pt requiring increased A with ADLs d/t decreased strength, pain, and flexiblity   Assessment of Occupational Performance 1-3 Performance Deficits   Identified Performance Deficits Dressing, mobility, transfers   Planned Therapy Interventions (OT) ADL retraining;bed mobility training;transfer training   Clinical Decision Making Complexity (OT) low complexity   Therapy Frequency (OT) Daily   Predicted Duration of Therapy one session   Anticipated Equipment Needs Upon Discharge (OT)   (reacher, sock aide, shoe horn)   Risk & Benefits of therapy have been explained patient;daughter   Georgiana Vargas, OTR/L

## 2021-07-16 NOTE — PLAN OF CARE
Physical Therapy Discharge Summary    Reason for therapy discharge:    All goals and outcomes met, no further needs identified.    Progress towards therapy goal(s). See goals on Care Plan in [facility] electronic health record for goal details.  Goals met    Therapy recommendation(s):    Continued therapy is recommended.  Rationale/Recommendations:  OP PT.

## 2021-07-16 NOTE — PROGRESS NOTES
[facility]      OUTPATIENT PHYSICAL THERAPY EVALUATION  PLAN OF TREATMENT FOR OUTPATIENT REHABILITATION  (COMPLETE FOR INITIAL CLAIMS ONLY)  Patient's Last Name, First Name, M.I.  YOB: 1950  Mariusz Hebert                           Provider's Name  [facility] Medical Record No.  [id number]                               Onset Date:  07/15/21   Start of Care Date:  (P) 07/16/21      Type:     _X_PT   ___OT   ___SLP Medical Diagnosis:  (P) S/P TKA                        PT Diagnosis:  Impaired functional mobility   Visits from SOC:  1   _________________________________________________________________________________  Plan of Treatment/Functional Goals    Planned Interventions: gait training, home exercise program, stair training     Goals: See Physical Therapy Goals on Care Plan in [employer] electronic health record.    Therapy Frequency: Daily  Predicted Duration of Therapy Intervention: 1 day  _________________________________________________________________________________    I CERTIFY THE NEED FOR THESE SERVICES FURNISHED UNDER        THIS PLAN OF TREATMENT AND WHILE UNDER MY CARE     (Physician co-signature of this document indicates review and certification of the therapy plan).                Certification date from: (P) 07/16/21, Certification date to: (P) 07/16/21    Referring Physician: Dr. Sami Blankenship            Initial Assessment        See Physical Therapy evaluation dated (P) 07/16/21 in Epic electronic health record.

## 2021-07-16 NOTE — PROGRESS NOTES
[facility]      OUTPATIENT OCCUPATIONAL THERAPY  EVALUATION  PLAN OF TREATMENT FOR OUTPATIENT REHABILITATION  (COMPLETE FOR INITIAL CLAIMS ONLY)  Patient's Last Name, First Name, M.I.  YOB: 1950  Mariusz Hebert                             Provider's Name  [facility] Medical Record No.  [id number]                               Onset Date:  07/15/21   Start of Care Date:  (P) 07/16/21     Type:     ___PT   _X_OT   ___SLP Medical Diagnosis:  (P) L TKA                        OT Diagnosis:  Decreased ADL independence   Visits from SOC:  1   _________________________________________________________________________________  Plan of Treatment/Functional Goals    Planned Interventions: ADL retraining, bed mobility training, transfer training   Goals: See Occupational Therapy Goals on Care Plan in [email or web address] electronic health record.    Therapy Frequency: Daily  Predicted Duration of Therapy Intervention: one session  _________________________________________________________________________________    I CERTIFY THE NEED FOR THESE SERVICES FURNISHED UNDER        THIS PLAN OF TREATMENT AND WHILE UNDER MY CARE     (Physician co-signature of this document indicates review and certification of the therapy plan).                Certification date from: (P) 07/16/21, Certification date to: (P) 08/15/21    Referring Physician: marcos barrios            Initial Assessment        See Occupational Therapy evaluation dated (P) 07/16/21 in Epic electronic health record.

## 2021-07-16 NOTE — PLAN OF CARE
Occupational Therapy Discharge Summary    Reason for therapy discharge:    All goals and outcomes met, no further needs identified.    Progress towards therapy goal(s). See goals on Care Plan in Epic electronic health record for goal details.  Goals met    Therapy recommendation(s):    No further therapy is recommended.      Problem: OT General Care Plan  Goal: OT Frequency  7/16/2021 1043 by Georgiana Vargas OT  Outcome: Completed  7/16/2021 1041 by Georgiana Vargas, OT  Flowsheets (Taken 7/16/2021 1041)  OT Frequency: daily  Goal: Lower Body Dressing (OT)  Description: Lower Body Dressing (OT)  7/16/2021 1043 by Georgiana Vargas, OT  Outcome: Completed  7/16/2021 1041 by Georgiana Vargas, OT  Flowsheets (Taken 7/16/2021 1041)  OT goal: lower body dressing: Moderate assist  Goal: Bed Mobility (OT)  Description: Bed Mobility (OT)  7/16/2021 1043 by Georgiana Vargas, OT  Outcome: Completed  7/16/2021 1041 by Georgiana Vargas, OT  Flowsheets (Taken 7/16/2021 1041)  OT goal: bed mobility: Modified independent  Goal: Transfer (OT)  Description: Transfer (OT)  7/16/2021 1043 by Georgiana Vargas, OT  Outcome: Completed  7/16/2021 1041 by Georgiana Vargas, OT  Flowsheets (Taken 7/16/2021 1041)  OT Goal: transfer: Supervision/stand-by assist

## 2021-07-16 NOTE — PROGRESS NOTES
07/16/21 0945   Quick Adds   Type of Visit Initial PT Evaluation       Present yes  (Language Line)   Living Environment   People in home child(gerda), adult;spouse  (daughter)   Current Living Arrangements house  (No steps to enter)   Home Accessibility no concerns   Self-Care   Equipment Currently Used at Home cane, straight;walker, rolling   Activity/Exercise/Self-Care Comment Patient ambulating without AD prior to surgery   General Information   Onset of Illness/Injury or Date of Surgery 07/15/21   Referring Physician Dr. Sami Blankenship   Patient/Family Therapy Goals Statement (PT) Walk without pain   Pertinent History of Current Problem (include personal factors and/or comorbidities that impact the POC) S/P TKA   Weight-Bearing Status - LLE weight-bearing as tolerated   Cognition   Orientation Status (Cognition) oriented x 4   Affect/Mental Status (Cognition) WFL   Follows Commands (Cognition) WFL   Bed Mobility   Comment (Bed Mobility) Already up in chair   Transfers   Transfers sit-stand transfer   Sit-Stand Transfer   Sit-Stand [place] (Transfers) modified independence;verbal cues   Assistive Device (Sit-Stand Transfers) walker, front-wheeled   Sit/Stand Transfer Comments verbal cueing for safety   Gait/Stairs (Locomotion)   [place] Level (Gait) modified independence;verbal cues   Assistive Device (Gait) walker, front-wheeled   Distance in Feet (Required for LE Total Joints) 150x2   Pattern (Gait) step-through   Deviations/Abnormal Patterns (Gait) gait speed decreased   Maintains Weight-bearing Status (Gait) able to maintain   Clinical Impression   Criteria for Skilled Therapeutic Intervention yes, treatment indicated   PT Diagnosis (PT) Impaired functional mobility   Influenced by the following impairments pain, weakness   Functional limitations due to impairments gait, stairs   Clinical Presentation Stable/Uncomplicated   Clinical Presentation Rationale Pt presents as  medically diagnosed   Clinical Decision Making (Complexity) low complexity   Therapy Frequency (PT) Daily   Predicted Duration of Therapy Intervention (days/wks) 1 day   Planned Therapy Interventions (PT) gait training;home exercise program;stair training   Anticipated Equipment Needs at Discharge (PT) walker, rolling   Risk & Benefits of therapy have been explained patient;daughter   PT Discharge Planning    PT Discharge Recommendation (DC Rec) home with outpatient physical therapy   PT Rationale for DC Rec Patient ambulating safely and has assist at home as needed for mobility   Total Evaluation Time   Total Evaluation Time (Minutes) 8

## 2021-07-20 ENCOUNTER — PATIENT OUTREACH (OUTPATIENT)
Dept: GERIATRIC MEDICINE | Facility: CLINIC | Age: 71
End: 2021-07-20

## 2021-08-30 ENCOUNTER — TRANSFERRED RECORDS (OUTPATIENT)
Dept: HEALTH INFORMATION MANAGEMENT | Facility: CLINIC | Age: 71
End: 2021-08-30

## 2021-10-15 ENCOUNTER — PATIENT OUTREACH (OUTPATIENT)
Dept: GERIATRIC MEDICINE | Facility: CLINIC | Age: 71
End: 2021-10-15
Payer: COMMERCIAL

## 2021-10-25 DIAGNOSIS — M17.12 PRIMARY OSTEOARTHRITIS OF LEFT KNEE: Primary | ICD-10-CM

## 2021-10-26 RX ORDER — CELECOXIB 200 MG/1
200 CAPSULE ORAL DAILY
Qty: 30 CAPSULE | Refills: 11 | Status: SHIPPED | OUTPATIENT
Start: 2021-10-26 | End: 2021-11-04

## 2021-11-04 ENCOUNTER — OFFICE VISIT (OUTPATIENT)
Dept: FAMILY MEDICINE | Facility: CLINIC | Age: 71
End: 2021-11-04
Payer: COMMERCIAL

## 2021-11-04 VITALS
WEIGHT: 149 LBS | OXYGEN SATURATION: 96 % | TEMPERATURE: 98.9 F | DIASTOLIC BLOOD PRESSURE: 70 MMHG | RESPIRATION RATE: 16 BRPM | BODY MASS INDEX: 29.25 KG/M2 | SYSTOLIC BLOOD PRESSURE: 110 MMHG | HEIGHT: 60 IN | HEART RATE: 86 BPM

## 2021-11-04 DIAGNOSIS — M17.11 PRIMARY OSTEOARTHRITIS OF RIGHT KNEE: ICD-10-CM

## 2021-11-04 DIAGNOSIS — R39.15 BENIGN PROSTATIC HYPERPLASIA (BPH) WITH URINARY URGENCY: ICD-10-CM

## 2021-11-04 DIAGNOSIS — M48.061 SPINAL STENOSIS, LUMBAR REGION, WITHOUT NEUROGENIC CLAUDICATION: ICD-10-CM

## 2021-11-04 DIAGNOSIS — G57.92 NEURITIS OF LEFT LOWER EXTREMITY: ICD-10-CM

## 2021-11-04 DIAGNOSIS — H10.13 ALLERGIC CONJUNCTIVITIS, BILATERAL: ICD-10-CM

## 2021-11-04 DIAGNOSIS — M17.12 PRIMARY OSTEOARTHRITIS OF LEFT KNEE: ICD-10-CM

## 2021-11-04 DIAGNOSIS — E78.00 PURE HYPERCHOLESTEROLEMIA: ICD-10-CM

## 2021-11-04 DIAGNOSIS — Z23 HIGH PRIORITY FOR 2019-NCOV VACCINE: Primary | ICD-10-CM

## 2021-11-04 DIAGNOSIS — N40.1 BENIGN PROSTATIC HYPERPLASIA (BPH) WITH URINARY URGENCY: ICD-10-CM

## 2021-11-04 PROCEDURE — 90662 IIV NO PRSV INCREASED AG IM: CPT | Performed by: FAMILY MEDICINE

## 2021-11-04 PROCEDURE — 91301 COVID-19,PF,MODERNA (18+ YRS BOOSTER .25ML): CPT | Performed by: FAMILY MEDICINE

## 2021-11-04 PROCEDURE — 99215 OFFICE O/P EST HI 40 MIN: CPT | Mod: 25 | Performed by: FAMILY MEDICINE

## 2021-11-04 PROCEDURE — 90471 IMMUNIZATION ADMIN: CPT | Performed by: FAMILY MEDICINE

## 2021-11-04 RX ORDER — CELECOXIB 200 MG/1
200 CAPSULE ORAL DAILY
Qty: 30 CAPSULE | Refills: 11 | Status: SHIPPED | OUTPATIENT
Start: 2021-11-04 | End: 2022-06-02

## 2021-11-04 RX ORDER — SIMVASTATIN 20 MG
20 TABLET ORAL EVERY EVENING
Qty: 90 TABLET | Refills: 3 | Status: SHIPPED | OUTPATIENT
Start: 2021-11-04 | End: 2022-03-02

## 2021-11-04 RX ORDER — TAMSULOSIN HYDROCHLORIDE 0.4 MG/1
CAPSULE ORAL
Qty: 90 CAPSULE | Refills: 3 | Status: SHIPPED | OUTPATIENT
Start: 2021-11-04 | End: 2022-06-02

## 2021-11-04 RX ORDER — DULOXETIN HYDROCHLORIDE 60 MG/1
60 CAPSULE, DELAYED RELEASE ORAL DAILY
Qty: 90 CAPSULE | Refills: 3 | Status: SHIPPED | OUTPATIENT
Start: 2021-11-04 | End: 2022-06-02

## 2021-11-04 ASSESSMENT — MIFFLIN-ST. JEOR: SCORE: 1283.36

## 2021-11-04 NOTE — PROGRESS NOTES
ASSESSMENT and plan   1. Allergic conjunctivitis, bilateral      - COVID-19,PF,MODERNA (18+ Yrs BOOSTER .25mL)  - ketotifen (ZADITOR) 0.025 % ophthalmic solution; Place 1 drop into both eyes daily as needed for itching  Dispense: 10 mL; Refill: 4    2. High priority for 2019-nCoV vaccine    Agrees for vaccination today.    3. Lumbar Canal Stenosis    The combination of gabapentin amitriptyline and duloxetine is helping he notes no back pain today.    4. Pure hypercholesterolemia    Rechecking cholesterol in approximately 6 months.    5. Benign prostatic hyperplasia (BPH) with urinary urgency    Symptoms with generic Flomax are well controlled    6. Neuritis of left lower extremity    He had successful knee surgery on his left knee he complains of no pain over the left knee of the left leg    7. Primary osteoarthritis of right knee    Minor discomfort noted over the right knee takes Celebrex for that      There are no Patient Instructions on file for this visit.    Orders Placed This Encounter   Procedures     COVID-19,PF,MODERNA (18+ Yrs BOOSTER .25mL)     INFLUENZA, QUAD, HD, PF, 65+ (FLUZONE HD)     Medications Discontinued During This Encounter   Medication Reason     ketotifen (ZADITOR/ZYRTEC ITCHY EYES) 0.025 % (0.035 %) ophthalmic solution Reorder       Follow-up in 4 months    CHIEF COMPLAINT:  chief complaint    HISTORY OF PRESENT ILLNESS:  Mariusz is a 71 year old male is here for a follow-up with his daughter today.  He reports that he has been doing well since his knee surgery several months ago.  His daughter reports he is doing the exercises feels some knee stiffness but overall believes his pain is better he brought his medications in for review she is sleeping well and his back pain and leg pain have improved.  He notes that the medication for his prostate is helping him he notes no side effects from it.    REVIEW OF SYSTEMS:     Musculoskeletal occasional left knee pain noted which is minor right knee  stiffness noted  10 point review of  All other systems are negative.    PFSH:    Social history reviewed    TOBACCO USE:  History   Smoking Status     Never Smoker   Smokeless Tobacco     Never Used       VITALS:  Vitals:    11/04/21 1129   BP: 110/70   BP Location: Left arm   Patient Position: Sitting   Cuff Size: Adult Regular   Pulse: 86   Resp: 16   Temp: 98.9  F (37.2  C)   TempSrc: Oral   SpO2: 96%   Weight: 67.6 kg (149 lb)   Height: 1.524 m (5')     Wt Readings from Last 3 Encounters:   11/04/21 67.6 kg (149 lb)   07/15/21 64 kg (141 lb)   07/02/21 65.4 kg (144 lb 2 oz)       PHYSICAL EXAM:    Interactive elderly male sitting comfortably in exam room no acute distress  HEENT neck supple mucous members moist oral cavity shows no exudate no erythema  Respiratory system clear to auscultation equal breath sounds no wheezes no crackles  CVS regular rate and rhythm no murmurs no rubs no gallops appreciated  Abdomen soft there is no focal tenderness bowel sounds are present  Musculoskeletal system no tenderness elicited when I palpate the lumbar spine there is no tenderness over the left hip joint there is no tenderness over the left knee.    DATA REVIEWED:  Additional History from Old Records Summarized (2): 0  Decision to Obtain Records (1): 0  Radiology Tests Summarized or Ordered (1): 0  Labs Reviewed or Ordered (1): 0  Medicine Test Summarized or Ordered (1): 0  Independent Review of EKG or X-RAY(2 each): 0    The visit lasted a total of 40 minutes this included time spent discussing the patient's operative procedure with Dr Villeda from [facility] today.  Follow-up recommended in approximately 4 to 5 months.    MEDICATIONS:  Current Outpatient Medications   Medication Sig Dispense Refill     acetaminophen (TYLENOL) 325 MG tablet Take 2 tablets (650 mg) by mouth every 4 hours as needed for other (mild pain) 100 tablet 0     acetaminophen (TYLENOL) 325 MG tablet [ACETAMINOPHEN (TYLENOL) 325 MG TABLET]  Take 2 tablets (650 mg total) by mouth every 6 (six) hours as needed. 100 tablet 12     aspirin 81 MG EC tablet Take 1 tablet (81 mg) by mouth 2 times daily 60 tablet 0     celecoxib (CELEBREX) 200 MG capsule Take 1 capsule (200 mg) by mouth daily 30 capsule 11     cetirizine (ZYRTEC) 10 MG tablet [CETIRIZINE (ZYRTEC) 10 MG TABLET] TAKE ONE TABLET daily 90 tablet 1     DULoxetine (CYMBALTA) 60 MG capsule [DULOXETINE (CYMBALTA) 60 MG CAPSULE] TAKE 1 CAPSULE (60 MG TOTAL) BY MOUTH DAILY. 90 capsule 3     gabapentin (NEURONTIN) 300 MG capsule [GABAPENTIN (NEURONTIN) 300 MG CAPSULE] TAKE 3 PILLS BY MOUTH 3 TIMES DAILY/ CHIP HNUB NOJ 3 LUB 3 ZAUG 810 capsule 3     hydrOXYzine (ATARAX) 10 MG tablet Take 1 tablet (10 mg) by mouth every 6 hours as needed for itching or anxiety (with pain, moderate pain) 30 tablet 0     ketotifen (ZADITOR) 0.025 % ophthalmic solution Place 1 drop into both eyes daily as needed for itching 10 mL 4     oxyCODONE (ROXICODONE) 5 MG tablet Take 0.5-1 tablets (2.5-5 mg) by mouth every 4 hours as needed for moderate to severe pain 30 tablet 0     senna-docusate (SENOKOT-S/PERICOLACE) 8.6-50 MG tablet Take 1-2 tablets by mouth 2 times daily Take while on oral narcotics to prevent or treat constipation. 30 tablet 1     simvastatin (ZOCOR) 20 MG tablet [SIMVASTATIN (ZOCOR) 20 MG TABLET] TAKE 1 TABLET (20 MG TOTAL) BY MOUTH EVERY EVENING. 90 tablet 3     tamsulosin (FLOMAX) 0.4 mg cap [TAMSULOSIN (FLOMAX) 0.4 MG CAP] TAKE 1 CAPSULE (0.4 MG TOTAL) BY MOUTH DAILY AFTER LUNCH. 90 capsule 3

## 2021-11-12 ENCOUNTER — PATIENT OUTREACH (OUTPATIENT)
Dept: GERIATRIC MEDICINE | Facility: CLINIC | Age: 71
End: 2021-11-12
Payer: COMMERCIAL

## 2021-11-12 NOTE — LETTER
[facility]  [address] [place] [address], [address]  [place] [address]  Phone:  [phone]  Fax:  [phone]      October 22, 2021    CHARO PENA  [address] [place] [address]    Dear Charo,    Enclosed is a copy of your completed PCA Assessment and Service Plan.  This is for your records and no action is required by you.  If you have additional questions regarding your assessment please contact me at [phone]. If you feel that your needs are not being met, please contact the Clinical Supervisor at [phone].    Sincerely,    JANNETH Gonzalez  [phone]  [email or web address]            Enclosure:  Completed PCA assessment

## 2021-11-12 NOTE — PROGRESS NOTES
[facility] Care Coordination Contact    Received after visit chart from care coordinator.  Completed following tasks: Mailed copy of care plan to client.    Aviva Barton  Care Management Specialist  [facility]  [phone]

## 2021-11-12 NOTE — LETTER
November 12, 2021    CHARO PENA  [address] [place] [address]        Dear Charo:    At [facility], we are dedicated to improving your health and well-being. Enclosed is the Comprehensive Care Plan that we developed with you on 10/15/2021. Please review the Care Plan carefully.    As a reminder, some of the things we discussed at your visit include:    Your physical and mental health    Ways to reduce falls    Health care needs you may have    Don t forget to contact your care coordinator if you:    Have been hospitalized or plan to be hospitalized     Have had a fall     Have experienced a change in physical health    Are experiencing emotional problems     If you do not agree with your Care Plan, have questions about it, or have experienced a change in your needs, please call me at [phone]. If you are hearing impaired, please call the [place] [employer] at 771 or [phone] (yeaqdz-zs-nirtie relay service).    Sincerely,        JANNETH Gonzalez  [phone]  [email or web address]    [id number] ([id number])     [id number] (11/18)

## 2021-11-16 NOTE — PROGRESS NOTES
[facility] Care Coordination Contact    [facility] Home Visit Assessment     Home visit for Health Risk Assessment with Mariusz Hebert completed on October 15,2021    Type of residence:: Private home - stairs  Current living arrangement:: I live in a private home with family,I live in a private home with spouse     Assessment completed with:: Patient,Family    Current Care Plan  Member currently receiving the following home care services:     Member currently receiving the following community resources: Day Care,DME,PCA      Medication Review  Medication reconciliation completed in Epic: Yes  Medication set-up & administration: Family/informal caregiver sets up daily.  Family caregiver administers medications.  Medication Risk Assessment Medication (1 or more, place referral to MTM): N/A: No risk factors identified  MTM Referral Placed: No: No risk factors idenified    Mental/Behavioral Health   Depression Screening:           Mental health DX:: No        Falls Assessment:   Fallen 2 or more times in the past year?: No   Any fall with injury in the past year?: No    ADL/IADL Dependencies:   Dependent ADLs:: Ambulation-cane,Bathing,Dressing,Grooming,Incontinence,Transfers,Toileting  Dependent IADLs:: Incontinence,Meal Preparation,Shopping,Transportation,Money Management,Laundry,Cooking,Medication Management,Cleaning    [facility] Plan sponsored benefits: Shared information re: Silver Sneakers/gym memberships, ASA, Calcium +D.    PCA Assessment completed at visit: Yes Annual PCA assessment indicated 16 units per day of PCA. This is the same as the previous assessment.     Elderly Waiver Eligibility: Yes-will continue on EW    Care Plan & Recommendations:    [facility] PCA will remain the same - 4 hours daily of PCA.   He will also continue to talk to his Adult Day Care staff every Friday over the phone for socialization; he refuses to attend because of COVID-19  He has no new needs at this time.       See [facility]  for detailed assessment information.    Follow-Up Plan: Member informed of future contact, plan to f/u with member with a 6 month telephone assessment.  Contact information shared with member and family, encouraged member to call with any questions or concerns at any time.    [place] care continuum providers: Please refer to Health Care Home on the Epic Problem List to view this patient's [facility] Care Plan Summary.    JANNETH Gonzalez  [facility]  Phone: [phone]

## 2021-11-22 NOTE — PROGRESS NOTES
[facility] Care Coordination Contact    Received after visit chart from care coordinator.  Completed following tasks: Mailed copy of care plan to client, Updated services in access and Submitted referrals/auths for ADC.     Provider Signature - No POC Shared:  Member indicates that they do not want their POC shared with any EW providers.    [facility]:  Emailed completed PCA assessment to [facility].  Faxed copy of PCA assessment to PCA Agency and mailed copy to member.  Faxed MD Communication to PCP.     Uploaded GOLDY.    Aviva Barton  Care Management Specialist  [facility]  [phone]

## 2021-12-15 ASSESSMENT — PATIENT HEALTH QUESTIONNAIRE - PHQ9: SUM OF ALL RESPONSES TO PHQ QUESTIONS 1-9: 0

## 2021-12-27 DIAGNOSIS — K59.00 CONSTIPATION: ICD-10-CM

## 2021-12-27 DIAGNOSIS — Z76.0 ENCOUNTER FOR MEDICATION REFILL: Primary | ICD-10-CM

## 2021-12-27 RX ORDER — POLYETHYLENE GLYCOL 3350 17 G/17G
1 POWDER, FOR SOLUTION ORAL DAILY
Qty: 850 G | Refills: 3 | Status: SHIPPED | OUTPATIENT
Start: 2021-12-27 | End: 2022-06-02

## 2021-12-27 NOTE — TELEPHONE ENCOUNTER
Reason for Call:  Other prescription    Detailed comments: Patient contacts office to request order/refill of Miralax to use for constipation. CMA queued and pended prescription order.     Phone Number Patient can be reached at: Cell number on file:    Telephone Information:   Mobile [phone]       Best Time: ASAP     Can we leave a detailed message on this number? YES    Call taken on 12/27/2021 at 12:30 PM by Yemi Thompson

## 2021-12-27 NOTE — TELEPHONE ENCOUNTER
No call back needed unless prescriber has follow up directions/questions. Otherwise, please sign and send.

## 2022-03-02 ENCOUNTER — OFFICE VISIT (OUTPATIENT)
Dept: FAMILY MEDICINE | Facility: CLINIC | Age: 72
End: 2022-03-02
Payer: COMMERCIAL

## 2022-03-02 VITALS
DIASTOLIC BLOOD PRESSURE: 70 MMHG | HEIGHT: 60 IN | SYSTOLIC BLOOD PRESSURE: 126 MMHG | HEART RATE: 67 BPM | OXYGEN SATURATION: 96 % | WEIGHT: 152.06 LBS | TEMPERATURE: 98.1 F | BODY MASS INDEX: 29.85 KG/M2

## 2022-03-02 DIAGNOSIS — M17.12 PRIMARY OSTEOARTHRITIS OF LEFT KNEE: ICD-10-CM

## 2022-03-02 DIAGNOSIS — M54.41 CHRONIC RIGHT-SIDED LOW BACK PAIN WITH RIGHT-SIDED SCIATICA: ICD-10-CM

## 2022-03-02 DIAGNOSIS — G57.92 NEURITIS OF LEFT LOWER EXTREMITY: ICD-10-CM

## 2022-03-02 DIAGNOSIS — G89.29 CHRONIC RIGHT-SIDED LOW BACK PAIN WITH RIGHT-SIDED SCIATICA: ICD-10-CM

## 2022-03-02 DIAGNOSIS — M17.11 PRIMARY OSTEOARTHRITIS OF RIGHT KNEE: ICD-10-CM

## 2022-03-02 DIAGNOSIS — R39.15 BENIGN PROSTATIC HYPERPLASIA (BPH) WITH URINARY URGENCY: Primary | ICD-10-CM

## 2022-03-02 DIAGNOSIS — N40.1 BENIGN PROSTATIC HYPERPLASIA (BPH) WITH URINARY URGENCY: Primary | ICD-10-CM

## 2022-03-02 DIAGNOSIS — E78.00 PURE HYPERCHOLESTEROLEMIA: ICD-10-CM

## 2022-03-02 PROCEDURE — 99214 OFFICE O/P EST MOD 30 MIN: CPT | Performed by: FAMILY MEDICINE

## 2022-03-02 RX ORDER — AMOXICILLIN 250 MG
1-2 CAPSULE ORAL 2 TIMES DAILY
Qty: 30 TABLET | Refills: 1 | Status: SHIPPED | OUTPATIENT
Start: 2022-03-02 | End: 2024-04-11

## 2022-03-02 RX ORDER — SIMVASTATIN 20 MG
20 TABLET ORAL EVERY EVENING
Qty: 90 TABLET | Refills: 3 | Status: SHIPPED | OUTPATIENT
Start: 2022-03-02 | End: 2022-09-02

## 2022-03-02 RX ORDER — GABAPENTIN 300 MG/1
600 CAPSULE ORAL 3 TIMES DAILY
Qty: 180 CAPSULE | Refills: 3 | Status: SHIPPED | OUTPATIENT
Start: 2022-03-02 | End: 2022-09-02

## 2022-03-02 NOTE — PROGRESS NOTES
ASSESSMENT and plan   1. Pure hypercholesterolemia    He is taking the Zocor no side effects noted  - simvastatin (ZOCOR) 20 MG tablet; Take 1 tablet (20 mg) by mouth every evening  Dispense: 90 tablet; Refill: 3    2. Benign prostatic hyperplasia (BPH) with urinary urgency    He has been urinating regularly he has no discomfort when urinating no dribbling of urine    3. Neuritis of left lower extremity    He notes that he has minimal discomfort in his right lower back and the lower left extremity have decreased the dose of gabapentin from a total of 9 to 6 capsules a day his daughter will help him with this.  - gabapentin (NEURONTIN) 300 MG capsule; Take 2 capsules (600 mg) by mouth 3 times daily  Dispense: 180 capsule; Refill: 3    4. Chronic right-sided low back pain with right-sided sciatica    Continues to have right knee pain he is seen by Dr. Sami barrios from [facility] who has been giving him steroid injections he may require a knee surgery in the future they will make the appointment  - Orthopedic  Referral; Future    5. Primary osteoarthritis of right knee    Patient currently is taking Celebrex for the knee pain he is no longer taking tramadol.    6. Primary osteoarthritis of left knee      Left knee pain is minimal.        Patient Instructions   It was a pleasure to see you today I have decreased the medication for your pain in your back.  Now you will be taking gabapentin 2 capsules 3 times a day.      Please make sure to call your orthopedics or bone doctor you need to see Dr. Villeda again for your right knee pain        I will see you again in 3 months      Orders Placed This Encounter   Procedures     Orthopedic  Referral     Medications Discontinued During This Encounter   Medication Reason     simvastatin (ZOCOR) 20 MG tablet Reorder     gabapentin (NEURONTIN) 300 MG capsule Reorder     senna-docusate (SENOKOT-S/PERICOLACE) 8.6-50 MG tablet Reorder       Return in about 3  months (around 6/2/2022) for neuralgia.    CHIEF COMPLAINT:  chief complaint follow-up for back pain knee pain prostate issues.    HISTORY OF PRESENT ILLNESS:  Mariusz is a 72 year old male who is here with his daughter today.  He reports that his left knee pain is much better he has had knee replacement done his right knee pain is bothering him he does get steroid injections every 2 to 3 months its been 2 months since his last injection he says the pain medication does help.  He states that his back pain is better with a combination of gabapentin and duloxetine.  He states that he is urinating regularly.  He has no new problems with sleep.  He does not have constipation at this time.    REVIEW OF SYSTEMS:     Musculoskeletal positive for right knee pain with long periods of walking some back pain noted on the right upper lumbar side otherwise 10 point review of  All other systems are negative.    PFSH:    Social history reviewed    TOBACCO USE:  History   Smoking Status     Never Smoker   Smokeless Tobacco     Never Used       VITALS:  Vitals:    03/02/22 1012   BP: 126/70   Pulse: 67   Temp: 98.1  F (36.7  C)   TempSrc: Oral   SpO2: 96%   Weight: 69 kg (152 lb 1 oz)   Height: 1.524 m (5')     Wt Readings from Last 3 Encounters:   03/02/22 69 kg (152 lb 1 oz)   11/04/21 67.6 kg (149 lb)   07/15/21 64 kg (141 lb)       PHYSICAL EXAM:  Interactive elderly appearing male sitting comfortably in the exam room no acute distress  Musculoskeletal system no tenderness elicited when I palpate his lumbar spine he has no tenderness over the left SI joint tenderness noted over the right knee.  Forward flexion extension near 30 and 20 degrees causes discomfort.  No discomfort noted over the left patella.  He has no tenderness of the left quadriceps  Abdomen soft there is no focal tenderness bowel sounds are present  Psych oriented x3 not agitated well-groomed    DATA REVIEWED:  Additional History from Old Records Summarized (2):  0  Decision to Obtain Records (1): 0  Radiology Tests Summarized or Ordered (1): 0  Labs Reviewed or Ordered (1): 0  Medicine Test Summarized or Ordered (1): 0  Independent Review of EKG or X-RAY(2 each): 0    The visit lasted a total of 30 minutes no all the.    MEDICATIONS:  Current Outpatient Medications   Medication Sig Dispense Refill     celecoxib (CELEBREX) 200 MG capsule Take 1 capsule (200 mg) by mouth daily 30 capsule 11     cetirizine (ZYRTEC) 10 MG tablet [CETIRIZINE (ZYRTEC) 10 MG TABLET] TAKE ONE TABLET daily 90 tablet 1     DULoxetine (CYMBALTA) 60 MG capsule Take 1 capsule (60 mg) by mouth daily 90 capsule 3     gabapentin (NEURONTIN) 300 MG capsule Take 2 capsules (600 mg) by mouth 3 times daily 180 capsule 3     hydrOXYzine (ATARAX) 10 MG tablet Take 1 tablet (10 mg) by mouth every 6 hours as needed for itching or anxiety (with pain, moderate pain) 30 tablet 0     ketotifen (ZADITOR) 0.025 % ophthalmic solution Place 1 drop into both eyes daily as needed for itching 10 mL 4     oxyCODONE (ROXICODONE) 5 MG tablet Take 0.5-1 tablets (2.5-5 mg) by mouth every 4 hours as needed for moderate to severe pain 30 tablet 0     polyethylene glycol (MIRALAX) 17 GM/Dose powder Take 17 g (1 capful) by mouth daily 850 g 3     senna-docusate (SENOKOT-S/PERICOLACE) 8.6-50 MG tablet Take 1-2 tablets by mouth 2 times daily Take while on oral narcotics to prevent or treat constipation. 30 tablet 1     simvastatin (ZOCOR) 20 MG tablet Take 1 tablet (20 mg) by mouth every evening 90 tablet 3     tamsulosin (FLOMAX) 0.4 MG capsule [TAMSULOSIN (FLOMAX) 0.4 MG CAP] TAKE 1 CAPSULE (0.4 MG TOTAL) BY MOUTH DAILY AFTER LUNCH. 90 capsule 3     acetaminophen (TYLENOL) 325 MG tablet Take 2 tablets (650 mg) by mouth every 4 hours as needed for other (mild pain) 100 tablet 0     acetaminophen (TYLENOL) 325 MG tablet [ACETAMINOPHEN (TYLENOL) 325 MG TABLET] Take 2 tablets (650 mg total) by mouth every 6 (six) hours as needed. 100  tablet 12     aspirin 81 MG EC tablet Take 1 tablet (81 mg) by mouth 2 times daily 60 tablet 0

## 2022-03-02 NOTE — PATIENT INSTRUCTIONS
It was a pleasure to see you today I have decreased the medication for your pain in your back.  Now you will be taking gabapentin 2 capsules 3 times a day.      Please make sure to call your orthopedics or bone doctor you need to see Dr. Villeda again for your right knee pain        I will see you again in 3 months

## 2022-05-04 ENCOUNTER — PATIENT OUTREACH (OUTPATIENT)
Dept: GERIATRIC MEDICINE | Facility: CLINIC | Age: 72
End: 2022-05-04
Payer: COMMERCIAL

## 2022-05-04 NOTE — PROGRESS NOTES
[facility] Care Coordination Contact      [facility] Six-Month Telephone Assessment    6 month telephone assessment completed on 05/04/22.    ER visits: No  Hospitalizations: No  TCU stays: No  Significant health status changes: NA  Falls/Injuries: No  ADL/IADL changes: No  Changes in services: No    Caregiver Assessment follow up:  NA    Goals: See POC in chart for goal progress documentation.  Care Coordinator spoke to Mariusz Hebert with [facility] .   He continues to attend [facility] x week.  He is glad he receives his PCA. He would like to start attending ADULT DAY CARE at [facility].   Care Coordinator called [facility] asking for status of why he is not attending and if they need a service agreement.     Will see member in 6 months for an annual health risk assessment.   Encouraged member to call CC with any questions or concerns in the meantime.     JANNETH Gonzalez  [facility]  Phone: [phone]

## 2022-06-02 ENCOUNTER — OFFICE VISIT (OUTPATIENT)
Dept: FAMILY MEDICINE | Facility: CLINIC | Age: 72
End: 2022-06-02
Payer: COMMERCIAL

## 2022-06-02 VITALS
RESPIRATION RATE: 18 BRPM | WEIGHT: 147.19 LBS | DIASTOLIC BLOOD PRESSURE: 66 MMHG | BODY MASS INDEX: 28.9 KG/M2 | TEMPERATURE: 98.2 F | SYSTOLIC BLOOD PRESSURE: 122 MMHG | HEART RATE: 68 BPM | HEIGHT: 60 IN

## 2022-06-02 DIAGNOSIS — Z76.0 ENCOUNTER FOR MEDICATION REFILL: ICD-10-CM

## 2022-06-02 DIAGNOSIS — Z23 ENCOUNTER FOR IMMUNIZATION: ICD-10-CM

## 2022-06-02 DIAGNOSIS — Z12.11 SCREEN FOR COLON CANCER: ICD-10-CM

## 2022-06-02 DIAGNOSIS — N40.1 BENIGN PROSTATIC HYPERPLASIA (BPH) WITH URINARY URGENCY: ICD-10-CM

## 2022-06-02 DIAGNOSIS — R09.82 POSTNASAL DRIP: ICD-10-CM

## 2022-06-02 DIAGNOSIS — K59.01 SLOW TRANSIT CONSTIPATION: ICD-10-CM

## 2022-06-02 DIAGNOSIS — M17.12 PRIMARY OSTEOARTHRITIS OF LEFT KNEE: ICD-10-CM

## 2022-06-02 DIAGNOSIS — R39.15 BENIGN PROSTATIC HYPERPLASIA (BPH) WITH URINARY URGENCY: ICD-10-CM

## 2022-06-02 DIAGNOSIS — Z79.899 ENCOUNTER FOR LONG-TERM (CURRENT) USE OF MEDICATIONS: ICD-10-CM

## 2022-06-02 DIAGNOSIS — M17.11 PRIMARY OSTEOARTHRITIS OF RIGHT KNEE: ICD-10-CM

## 2022-06-02 DIAGNOSIS — M48.061 SPINAL STENOSIS, LUMBAR REGION, WITHOUT NEUROGENIC CLAUDICATION: ICD-10-CM

## 2022-06-02 DIAGNOSIS — K59.09 OTHER CONSTIPATION: ICD-10-CM

## 2022-06-02 DIAGNOSIS — M48.061 SPINAL STENOSIS, LUMBAR REGION, WITHOUT NEUROGENIC CLAUDICATION: Primary | ICD-10-CM

## 2022-06-02 DIAGNOSIS — K59.04 CHRONIC IDIOPATHIC CONSTIPATION: ICD-10-CM

## 2022-06-02 DIAGNOSIS — H10.13 ALLERGIC CONJUNCTIVITIS, BILATERAL: ICD-10-CM

## 2022-06-02 PROCEDURE — 90471 IMMUNIZATION ADMIN: CPT | Performed by: FAMILY MEDICINE

## 2022-06-02 PROCEDURE — 90715 TDAP VACCINE 7 YRS/> IM: CPT | Performed by: FAMILY MEDICINE

## 2022-06-02 PROCEDURE — 99214 OFFICE O/P EST MOD 30 MIN: CPT | Mod: 25 | Performed by: FAMILY MEDICINE

## 2022-06-02 RX ORDER — CETIRIZINE HYDROCHLORIDE 10 MG/1
TABLET ORAL
Qty: 90 TABLET | Refills: 4 | Status: SHIPPED | OUTPATIENT
Start: 2022-06-02 | End: 2023-06-05

## 2022-06-02 RX ORDER — DULOXETIN HYDROCHLORIDE 60 MG/1
60 CAPSULE, DELAYED RELEASE ORAL DAILY
Qty: 90 CAPSULE | Refills: 3 | Status: SHIPPED | OUTPATIENT
Start: 2022-06-02 | End: 2022-09-02

## 2022-06-02 RX ORDER — CELECOXIB 200 MG/1
200 CAPSULE ORAL DAILY
Qty: 30 CAPSULE | Refills: 11 | Status: SHIPPED | OUTPATIENT
Start: 2022-06-02 | End: 2022-09-02

## 2022-06-02 RX ORDER — POLYETHYLENE GLYCOL 3350 17 G/17G
1 POWDER, FOR SOLUTION ORAL DAILY
Qty: 850 G | Refills: 11 | Status: SHIPPED | OUTPATIENT
Start: 2022-06-02 | End: 2022-09-02

## 2022-06-02 RX ORDER — CELECOXIB 200 MG/1
200 CAPSULE ORAL DAILY
Qty: 30 CAPSULE | Refills: 11 | Status: CANCELLED | OUTPATIENT
Start: 2022-06-02

## 2022-06-02 RX ORDER — TAMSULOSIN HYDROCHLORIDE 0.4 MG/1
CAPSULE ORAL
Qty: 90 CAPSULE | Refills: 3 | Status: SHIPPED | OUTPATIENT
Start: 2022-06-02 | End: 2022-09-02

## 2022-06-02 NOTE — PROGRESS NOTES
Assessment & Plan     Primary osteoarthritis of left knee  Minimal discomfort noted in left knee continue Celebrex side effects discussed  - celecoxib (CELEBREX) 200 MG capsule; Take 1 capsule (200 mg) by mouth daily    Postnasal drip    I refilled the cetirizine his daughter helps him use it  - cetirizine (ZYRTEC) 10 MG tablet; [CETIRIZINE (ZYRTEC) 10 MG TABLET] TAKE ONE TABLET daily    Encounter for long-term (current) use of medications    All medication discussed with the patient today    Screen for colon cancer    He agrees for Cologuard test  - COLOGUARD(EX[employer] SCIENCES)    Lumbar Canal Stenosis    Cymbalta to be continued at current dose.  - DULoxetine (CYMBALTA) 60 MG capsule; Take 1 capsule (60 mg) by mouth daily    Other constipation    Having difficulty with constipation despite taking the polyethylene glycol Colace added to his regimen    Constipation    1 bowel movement noted every 2 to 3 days    Encounter for medication refill      - polyethylene glycol (MIRALAX) 17 GM/Dose powder; Take 17 g (1 capful) by mouth daily    Encounter for immunization    Agrees for immunization    Benign prostatic hyperplasia (BPH) with urinary urgency    He did not bring his Flomax and however he still use the medication is beneficial no side effects have been noted  - tamsulosin (FLOMAX) 0.4 MG capsule; [TAMSULOSIN (FLOMAX) 0.4 MG CAP] TAKE 1 CAPSULE (0.4 MG TOTAL) BY MOUTH DAILY AFTER LUNCH.    Primary osteoarthritis of right knee      - DULoxetine (CYMBALTA) 60 MG capsule; Take 1 capsule (60 mg) by mouth daily    Spinal stenosis, lumbar region, without neurogenic claudication      - DULoxetine (CYMBALTA) 60 MG capsule; Take 1 capsule (60 mg) by mouth daily    Allergic conjunctivitis, bilateral      - ketotifen (ZADITOR) 0.025 % ophthalmic solution; Place 1 drop into both eyes daily as needed for itching    Slow transit constipation      - polyethylene glycol (MIRALAX) 17 GM/Dose powder; Take 17 g (1 capful) by mouth  daily         BMI:   Estimated body mass index is 28.75 kg/m  as calculated from the following:    Height as of this encounter: 1.524 m (5').    Weight as of this encounter: 66.8 kg (147 lb 3 oz).         Shree Dyson MD  [facility] NEISHA Vee is a 72 year old who presents for the following health issues back pain prostate issues left knee pain allergic conjunctivitis allergies causing sneezing and constipation.    History of Present Illness       Back Pain:  He presents for follow up of back pain. Patient's back pain is a recurring problem.  Location of back pain:  Right lower back  Description of back pain: burning, sharp and shooting  Back pain spreads: right buttocks and right knee    Since patient first noticed back pain, pain is: unchanged  Does back pain interfere with his job:  Not applicable                Review of Systems   Constitutional, HEENT, cardiovascular, pulmonary, GI, , musculoskeletal, neuro, skin, endocrine and psych systems are negative, except as otherwise noted.      Objective    /66   Pulse 68   Temp 98.2  F (36.8  C) (Oral)   Resp 18   Ht 1.524 m (5')   Wt 66.8 kg (147 lb 3 oz)   BMI 28.75 kg/m    Body mass index is 28.75 kg/m .  Physical Exam   GENERAL: healthy, alert and no distress  EYES: Eyes grossly normal to inspection, PERRL and conjunctivae and sclerae normal  HENT: ear canals and TM's normal, nose and mouth without ulcers or lesions  NECK: no adenopathy, no asymmetry, masses, or scars and thyroid normal to palpation  RESP: lungs clear to auscultation - no rales, rhonchi or wheezes  CV: regular rate and rhythm, normal S1 S2, no S3 or S4, no murmur, click or rub, no peripheral edema and peripheral pulses strong  ABDOMEN: soft, nontender, no hepatosplenomegaly, no masses and bowel sounds normal  MS: no gross musculoskeletal defects noted, no edema  SKIN: no suspicious lesions or rashes  NEURO: Normal strength and tone, mentation intact  and speech normal  PSYCH: mentation appears normal, affect normal/bright

## 2022-06-10 ENCOUNTER — OFFICE VISIT (OUTPATIENT)
Dept: FAMILY MEDICINE | Facility: CLINIC | Age: 72
End: 2022-06-10
Payer: COMMERCIAL

## 2022-06-10 VITALS
HEART RATE: 77 BPM | BODY MASS INDEX: 28.61 KG/M2 | HEIGHT: 60 IN | RESPIRATION RATE: 12 BRPM | SYSTOLIC BLOOD PRESSURE: 102 MMHG | TEMPERATURE: 98.1 F | OXYGEN SATURATION: 96 % | WEIGHT: 145.75 LBS | DIASTOLIC BLOOD PRESSURE: 60 MMHG

## 2022-06-10 DIAGNOSIS — S66.310A: ICD-10-CM

## 2022-06-10 DIAGNOSIS — M62.40 TENDON CONTRACTURE: Primary | ICD-10-CM

## 2022-06-10 PROCEDURE — 99213 OFFICE O/P EST LOW 20 MIN: CPT | Performed by: FAMILY MEDICINE

## 2022-06-10 NOTE — PROGRESS NOTES
Answers for HPI/ROS submitted by the patient on 6/2/2022  Your back pain is: recurring  Where is your back pain located? : right lower back  How would you describe your back pain? : burning, sharp, shooting  Where does your back pain spread? : right buttocks, right knee  Since you noticed your back pain, how has it changed? : unchanged  Does your back pain interfere with your job?: Not applicable    SCAR Hebert is a 72 year old male here for right hand pain- pain of palmar surface of 2nd finger. It feels stuck like he can't straighten it all the way.  Achy pain for more than a month.     No history of injury.   O  /60 (BP Location: Left arm, Patient Position: Sitting, Cuff Size: Adult Regular)   Pulse 77   Temp 98.1  F (36.7  C) (Oral)   Resp 12   Ht 1.524 m (5')   Wt 66.1 kg (145 lb 12 oz)   SpO2 96%   BMI 28.46 kg/m     Vitals reviewed. Nursing note reviewed.  General Appearance: Pleasant and alert, in no acute distress  Ext: tenderness of palmar surface of right 2nd MCP. Pain with flexion of this finger.  No peripheral edema, good distal perfusion  Skin: warm, dry, intact, no rash noted  Neuro: no focal deficits, CNs II-XII normal.   Psych: mood and affect are normal.    A/P  Mariusz was seen today for pain.    Diagnoses and all orders for this visit:    Tendon contracture: needs to see orthopedics for consideration of injection.   -     Orthopedic  Referral; Future    Strain of extensor muscle, fascia and tendon of right index finger at wrist and hand level, initial encounter  -     Orthopedic  Referral; Future         No follow-ups on file.      The entire visit was conducted through a professional .   Options for treatment and follow-up care were reviewed with the patient and/or guardian. Mariusz Hebert and/or guardian engaged in the decision making process and verbalized understanding of the options discussed and agreed with the final plan.    Yasmin Robert MD

## 2022-06-13 ENCOUNTER — TRANSFERRED RECORDS (OUTPATIENT)
Dept: HEALTH INFORMATION MANAGEMENT | Facility: CLINIC | Age: 72
End: 2022-06-13
Payer: COMMERCIAL

## 2022-07-01 LAB — NONINV COLON CA DNA+OCC BLD SCRN STL QL: NEGATIVE

## 2022-08-19 ENCOUNTER — PATIENT OUTREACH (OUTPATIENT)
Dept: GERIATRIC MEDICINE | Facility: CLINIC | Age: 72
End: 2022-08-19

## 2022-09-02 ENCOUNTER — OFFICE VISIT (OUTPATIENT)
Dept: FAMILY MEDICINE | Facility: CLINIC | Age: 72
End: 2022-09-02
Payer: COMMERCIAL

## 2022-09-02 VITALS
BODY MASS INDEX: 27.98 KG/M2 | HEIGHT: 60 IN | WEIGHT: 142.5 LBS | RESPIRATION RATE: 16 BRPM | DIASTOLIC BLOOD PRESSURE: 72 MMHG | OXYGEN SATURATION: 99 % | HEART RATE: 66 BPM | SYSTOLIC BLOOD PRESSURE: 116 MMHG | TEMPERATURE: 97.8 F

## 2022-09-02 DIAGNOSIS — H10.13 ALLERGIC CONJUNCTIVITIS, BILATERAL: ICD-10-CM

## 2022-09-02 DIAGNOSIS — R39.15 BENIGN PROSTATIC HYPERPLASIA (BPH) WITH URINARY URGENCY: ICD-10-CM

## 2022-09-02 DIAGNOSIS — M48.061 SPINAL STENOSIS, LUMBAR REGION, WITHOUT NEUROGENIC CLAUDICATION: ICD-10-CM

## 2022-09-02 DIAGNOSIS — M17.12 PRIMARY OSTEOARTHRITIS OF LEFT KNEE: ICD-10-CM

## 2022-09-02 DIAGNOSIS — G57.92 NEURITIS OF LEFT LOWER EXTREMITY: ICD-10-CM

## 2022-09-02 DIAGNOSIS — Z76.0 ENCOUNTER FOR MEDICATION REFILL: ICD-10-CM

## 2022-09-02 DIAGNOSIS — E78.00 PURE HYPERCHOLESTEROLEMIA: ICD-10-CM

## 2022-09-02 DIAGNOSIS — Z79.899 ENCOUNTER FOR LONG-TERM (CURRENT) USE OF MEDICATIONS: Primary | ICD-10-CM

## 2022-09-02 DIAGNOSIS — M17.11 PRIMARY OSTEOARTHRITIS OF RIGHT KNEE: ICD-10-CM

## 2022-09-02 DIAGNOSIS — N40.1 BENIGN PROSTATIC HYPERPLASIA (BPH) WITH URINARY URGENCY: ICD-10-CM

## 2022-09-02 DIAGNOSIS — K59.01 SLOW TRANSIT CONSTIPATION: ICD-10-CM

## 2022-09-02 LAB
ALBUMIN SERPL BCG-MCNC: 4.1 G/DL (ref 3.5–5.2)
ALP SERPL-CCNC: 63 U/L (ref 40–129)
ALT SERPL W P-5'-P-CCNC: 54 U/L (ref 10–50)
ANION GAP SERPL CALCULATED.3IONS-SCNC: 7 MMOL/L (ref 7–15)
AST SERPL W P-5'-P-CCNC: 54 U/L (ref 10–50)
BILIRUB SERPL-MCNC: 0.5 MG/DL
BUN SERPL-MCNC: 15.9 MG/DL (ref 8–23)
CALCIUM SERPL-MCNC: 9.5 MG/DL (ref 8.8–10.2)
CHLORIDE SERPL-SCNC: 100 MMOL/L (ref 98–107)
CHOLEST SERPL-MCNC: 243 MG/DL
CREAT SERPL-MCNC: 0.97 MG/DL (ref 0.67–1.17)
DEPRECATED HCO3 PLAS-SCNC: 28 MMOL/L (ref 22–29)
GFR SERPL CREATININE-BSD FRML MDRD: 83 ML/MIN/1.73M2
GLUCOSE SERPL-MCNC: 117 MG/DL (ref 70–99)
HDLC SERPL-MCNC: 61 MG/DL
LDLC SERPL CALC-MCNC: 166 MG/DL
NONHDLC SERPL-MCNC: 182 MG/DL
POTASSIUM SERPL-SCNC: 4.3 MMOL/L (ref 3.4–5.3)
PROT SERPL-MCNC: 7.4 G/DL (ref 6.4–8.3)
SODIUM SERPL-SCNC: 135 MMOL/L (ref 136–145)
TRIGL SERPL-MCNC: 78 MG/DL

## 2022-09-02 PROCEDURE — 80053 COMPREHEN METABOLIC PANEL: CPT | Performed by: FAMILY MEDICINE

## 2022-09-02 PROCEDURE — 99214 OFFICE O/P EST MOD 30 MIN: CPT | Performed by: FAMILY MEDICINE

## 2022-09-02 PROCEDURE — 36415 COLL VENOUS BLD VENIPUNCTURE: CPT | Performed by: FAMILY MEDICINE

## 2022-09-02 PROCEDURE — 80061 LIPID PANEL: CPT | Performed by: FAMILY MEDICINE

## 2022-09-02 RX ORDER — SIMVASTATIN 20 MG
20 TABLET ORAL EVERY EVENING
Qty: 90 TABLET | Refills: 3 | Status: SHIPPED | OUTPATIENT
Start: 2022-09-02 | End: 2023-01-18

## 2022-09-02 RX ORDER — TAMSULOSIN HYDROCHLORIDE 0.4 MG/1
CAPSULE ORAL
Qty: 90 CAPSULE | Refills: 3 | Status: SHIPPED | OUTPATIENT
Start: 2022-09-02 | End: 2023-01-18

## 2022-09-02 RX ORDER — CELECOXIB 200 MG/1
200 CAPSULE ORAL DAILY
Qty: 30 CAPSULE | Refills: 11 | Status: SHIPPED | OUTPATIENT
Start: 2022-09-02 | End: 2023-06-06

## 2022-09-02 RX ORDER — POLYETHYLENE GLYCOL 3350 17 G/17G
1 POWDER, FOR SOLUTION ORAL DAILY
Qty: 850 G | Refills: 11 | Status: SHIPPED | OUTPATIENT
Start: 2022-09-02 | End: 2023-01-18

## 2022-09-02 RX ORDER — DULOXETIN HYDROCHLORIDE 60 MG/1
60 CAPSULE, DELAYED RELEASE ORAL DAILY
Qty: 90 CAPSULE | Refills: 3 | Status: SHIPPED | OUTPATIENT
Start: 2022-09-02 | End: 2023-01-18

## 2022-09-02 RX ORDER — GABAPENTIN 300 MG/1
600 CAPSULE ORAL 3 TIMES DAILY
Qty: 180 CAPSULE | Refills: 3 | Status: SHIPPED | OUTPATIENT
Start: 2022-09-02 | End: 2023-01-18

## 2022-09-02 NOTE — PROGRESS NOTES
Assessment & Plan     Encounter for long-term (current) use of medications  Medication reviewed today refills were given.  Medications explained with the patient and his daughter with the help of a .    Benign prostatic hyperplasia (BPH) with urinary urgency    He again forgot to bring his Flomax he does have it at home he denies having any difficulty passing urine he gets up only once at night to urinate.  - tamsulosin (FLOMAX) 0.4 MG capsule; [TAMSULOSIN (FLOMAX) 0.4 MG CAP] TAKE 1 CAPSULE (0.4 MG TOTAL) BY MOUTH DAILY AFTER LUNCH.    Lumbar Canal Stenosis  He rates his back pain at a 5 out of 10.  He however can walk.  He states that bending his back is a major source of pain for many feels pain that radiates into the left buttock and left leg.  He states that while walking or keeping upright or lying in bed the back pain is minimal.  Repeating a CMP as he is on gabapentin  - Comprehensive metabolic panel (BMP + Alb, Alk Phos, ALT, AST, Total. Bili, TP); Future  - DULoxetine (CYMBALTA) 60 MG capsule; Take 1 capsule (60 mg) by mouth daily    Primary osteoarthritis of right knee    Some knee pain is noted it gets stiff at the end of the day however he can bend the knee and walk.  - DULoxetine (CYMBALTA) 60 MG capsule; Take 1 capsule (60 mg) by mouth daily    Pure hypercholesterolemia    On a statin no side effects noted refilled medication checking liver function test today.  - Comprehensive metabolic panel (BMP + Alb, Alk Phos, ALT, AST, Total. Bili, TP); Future  - Lipid panel reflex to direct LDL Non-fasting; Future  - simvastatin (ZOCOR) 20 MG tablet; Take 1 tablet (20 mg) by mouth every evening    Neuritis of left lower extremity    He notes no numbness or tingling the left lower extremity continue gabapentin  - gabapentin (NEURONTIN) 300 MG capsule; Take 2 capsules (600 mg) by mouth 3 times daily    Allergic conjunctivitis, bilateral    He would like to see an eye doctor.  He notes no change in  his vision.  He thinks the drops are helping with the itching  - Adult Eye  Referral; Future    Slow transit constipation    He understands why he needs to take the medication he is not constipated as long as he takes MiraLAX  - polyethylene glycol (MIRALAX) 17 GM/Dose powder; Take 17 g (1 capful) by mouth daily    Encounter for medication refill      - polyethylene glycol (MIRALAX) 17 GM/Dose powder; Take 17 g (1 capful) by mouth daily    Primary osteoarthritis of left knee    Celebrex prescribed side effects discussed  - celecoxib (CELEBREX) 200 MG capsule; Take 1 capsule (200 mg) by mouth daily    Spinal stenosis, lumbar region, without neurogenic claudication      - Comprehensive metabolic panel (BMP + Alb, Alk Phos, ALT, AST, Total. Bili, TP); Future  - DULoxetine (CYMBALTA) 60 MG capsule; Take 1 capsule (60 mg) by mouth daily    0956}         No follow-ups on file.    Shree Dyson MD  [facility] NEISHA Vee is a 72 year old accompanied by his daughter, presenting for the following health issues:  Follow Up      HPI   72-year-old male here for follow-up of BPH, lumbar canal stenosis, neuritis of the left lower extremity, constipation, and hyperlipidemia.  He is returning today for follow-up.  He saw Dr. Zuniga few months ago and she referred him to orthopedics where his trigger finger was treated.  He reports its not a problem anymore.  He reports has been getting all his medications he forgot to bring his prostate medication in again but states he is taking it he rates his pain in his left leg and hip and back at about a 5 out of 10 but he can walk regularly he sleeps well.  He says the pain in his right knee is about a 7 out of 10 but its not stiff in the morning.  His daughter says he is very active and seems to be doing well at home his appetite is described as being normal.  He would like to see an eye doctor.  The allergy medications are working his eyes are not  itchy when he uses the drops.        Review of Systems   Constitutional, HEENT, cardiovascular, pulmonary, gi and gu systems are negative, except as otherwise noted.      Objective    /72 (BP Location: Right arm, Patient Position: Sitting, Cuff Size: Adult Regular)   Pulse 66   Temp 97.8  F (36.6  C) (Oral)   Resp 16   Ht 1.524 m (5')   Wt 64.6 kg (142 lb 8 oz)   SpO2 99%   BMI 27.83 kg/m    Body mass index is 27.83 kg/m .  Physical Exam   GENERAL: healthy, alert and no distress  EYES: Eyes grossly normal to inspection, PERRL and conjunctivae and sclerae normal  NECK: no adenopathy, no asymmetry, masses, or scars and thyroid normal to palpation  RESP: lungs clear to auscultation - no rales, rhonchi or wheezes  CV: regular rate and rhythm, normal S1 S2, no S3 or S4, no murmur, click or rub, no peripheral edema and peripheral pulses strong  ABDOMEN: soft, nontender, no hepatosplenomegaly, no masses and bowel sounds normal  MS:   Tenderness over L5.  He is tender over the left SI joint.  Forward flexion of his lumbar spine at 30 degrees causes shooting pain in his left hip only.  Straight leg raise test is negative  SKIN: no suspicious lesions or rashes  NEURO: Normal strength and tone, mentation intact and speech normal  PSYCH: mentation appears normal, affect normal/bright                    [unfilled]  [unfilled]

## 2022-09-21 ENCOUNTER — PATIENT OUTREACH (OUTPATIENT)
Dept: GERIATRIC MEDICINE | Facility: CLINIC | Age: 72
End: 2022-09-21

## 2022-09-22 ENCOUNTER — PATIENT OUTREACH (OUTPATIENT)
Dept: GERIATRIC MEDICINE | Facility: CLINIC | Age: 72
End: 2022-09-22

## 2022-10-04 ASSESSMENT — ACTIVITIES OF DAILY LIVING (ADL)
DEPENDENT_IADLS:: CLEANING;TRANSPORTATION;INCONTINENCE;COOKING;LAUNDRY;SHOPPING;MEAL PREPARATION;MEDICATION MANAGEMENT;MONEY MANAGEMENT

## 2022-10-04 NOTE — PROGRESS NOTES
[facility] Care Coordination Contact    [facility] Home Visit Assessment     Home visit for Health Risk Assessment with Mariusz Hebert completed on 09/22/2022    Type of residence:: Private home - stairs  Current living arrangement:: I live in a private home with family     Assessment completed with:: Patient, Family    Current Care Plan  Member currently receiving the following home care services:     Member currently receiving the following community resources: Day Care, PCA, DME    Phone assessment due to COVID-19    Medication Review  Medication reconciliation completed in Epic: No and If no, please explain No face to face reconcile due to COVID-19  Medication set-up & administration: Family/informal caregiver sets up daily.  Family caregiver administers medications.  Medication Risk Assessment Medication (1 or more, place referral to MTM): N/A: No risk factors identified  MTM Referral Placed: No: No risk factors idenified    Mental/Behavioral Health   Depression Screening:   PHQ-2 Total Score (Adult) - Positive if 3 or more points; Administer PHQ-9 if positive: 0       Mental health DX:: No        Falls Assessment:   Fallen 2 or more times in the past year?: No   Any fall with injury in the past year?: No    ADL/IADL Dependencies:   Dependent ADLs:: Ambulation-walker, Ambulation-cane, Bathing, Dressing, Grooming, Incontinence, Transfers, Toileting  Dependent IADLs:: Cleaning, Transportation, Incontinence, Cooking, Laundry, Shopping, Meal Preparation, Medication Management, Money Management    [facility] sponsored benefits: Shared information re: Silver Sneakers/gym memberships, ASA, Calcium +D.    PCA Assessment completed at visit: Yes Annual PCA assessment indicated 16 units per day of PCA. This is the same as the previous assessment.     Elderly Waiver Eligibility: Yes-will continue on EW    Care Plan & Recommendations: Mariusz continues to attend Adult Day 3 x week and receives 4 hours of PCA  daily.   Mariusz is stable and has no other needs at this time.    See [facility] for detailed assessment information.    Follow-Up Plan: Member informed of future contact, plan to f/u with member with a 6 month telephone assessment.  Contact information shared with member and family, encouraged member to call with any questions or concerns at any time.    [place] care continuum providers: Please see Snapshot and Care Management Flowsheets for Specific details of care plan.    This CC note routed to PCP.    JANNETH Gonzalez  [facility]  Phone: [phone]

## 2022-10-05 ENCOUNTER — PATIENT OUTREACH (OUTPATIENT)
Dept: GERIATRIC MEDICINE | Facility: CLINIC | Age: 72
End: 2022-10-05

## 2022-10-05 NOTE — PROGRESS NOTES
[facility] Care Coordination Contact      [facility]:  Emailed completed PCA assessment to [facility].  Faxed copy of PCA assessment to PCA Agency and mailed copy to member.  Faxed MD Communication to PCP.       Maylin Cooper  Care Management Specialist  [facility]  [phone]

## 2022-10-10 ENCOUNTER — PATIENT OUTREACH (OUTPATIENT)
Dept: GERIATRIC MEDICINE | Facility: CLINIC | Age: 72
End: 2022-10-10

## 2022-10-10 NOTE — LETTER
October 10, 2022    CHARO PENA  [address] [place] [address]        Dear Charo:    At [facility], we re dedicated to improving your health and wellness. Enclosed is the Care Plan developed with you on 09/22/2022. Please review the Care Plan carefully.    As a reminder, during your visit we talked about:    Ways to manage your physical and mental health    Using health care to maintain and improve your health     Your preventive care needs     Remember to contact your care coordinator if you:    Are hospitalized, or plan to be hospitalized     Have a fall      Have a change in your physical or mental health    Need help finding support or services    If you have questions, or don t agree with your Care Plan, call me at [phone]. You can also call me if your needs change. TTY users, call the [place] [employer] at (350) or 1-801.508.1211 (ctkqhz-ji-pzegdy relay service).    Sincerely,        JANNETH Gonzalez  [phone]  [email or web address]    [id number] accepted    [id number] (07/2022)

## 2022-10-10 NOTE — PROGRESS NOTES
[facility] Care Coordination Contact    Received after visit chart from care coordinator.  Completed following tasks: Mailed copy of care plan to client, Updated services in Database, Mailed copy of POC and PCA signature sheet for member to sign and return in SASE  and Mailed [facility] Safe Medication Disposal    and Provider Signature - No POC Shared:  Member indicates that they do not want their POC shared with any EW providers.    Maylin Cooper  Care Management Specialist  [facility]  [phone]

## 2022-10-26 NOTE — PROGRESS NOTES
Encounter open to open episode per regulatory for reassessment.     Ana Maria Barton RN N  [facility] Coordinator  [phone]

## 2022-10-26 NOTE — PROGRESS NOTES
Encounter open to close episode per regulatory for reassessment.     Ana Maria Barton RN N  [facility] Coordinator  [phone]

## 2022-11-02 ENCOUNTER — PATIENT OUTREACH (OUTPATIENT)
Dept: GERIATRIC MEDICINE | Facility: CLINIC | Age: 72
End: 2022-11-02

## 2022-11-02 NOTE — PROGRESS NOTES
[facility] Care Coordination Contact    Received a request to submit a DTR for the terminated of Adult Day Care. Documentation completed and faxed to the health plan. Care Coordinator aware.  Isa Thurston RN  Utilization   [facility]  [phone]

## 2022-11-11 ENCOUNTER — MEDICAL CORRESPONDENCE (OUTPATIENT)
Dept: HEALTH INFORMATION MANAGEMENT | Facility: CLINIC | Age: 72
End: 2022-11-11

## 2023-01-18 ENCOUNTER — OFFICE VISIT (OUTPATIENT)
Dept: FAMILY MEDICINE | Facility: CLINIC | Age: 73
End: 2023-01-18
Payer: COMMERCIAL

## 2023-01-18 VITALS
DIASTOLIC BLOOD PRESSURE: 82 MMHG | TEMPERATURE: 97.9 F | SYSTOLIC BLOOD PRESSURE: 138 MMHG | WEIGHT: 147 LBS | HEIGHT: 60 IN | BODY MASS INDEX: 28.86 KG/M2 | OXYGEN SATURATION: 93 % | HEART RATE: 74 BPM

## 2023-01-18 DIAGNOSIS — K59.04 CHRONIC IDIOPATHIC CONSTIPATION: ICD-10-CM

## 2023-01-18 DIAGNOSIS — M17.11 PRIMARY OSTEOARTHRITIS OF RIGHT KNEE: ICD-10-CM

## 2023-01-18 DIAGNOSIS — K59.01 SLOW TRANSIT CONSTIPATION: ICD-10-CM

## 2023-01-18 DIAGNOSIS — M48.061 SPINAL STENOSIS, LUMBAR REGION, WITHOUT NEUROGENIC CLAUDICATION: ICD-10-CM

## 2023-01-18 DIAGNOSIS — G57.92 NEURITIS OF LEFT LOWER EXTREMITY: ICD-10-CM

## 2023-01-18 DIAGNOSIS — Z76.0 ENCOUNTER FOR MEDICATION REFILL: ICD-10-CM

## 2023-01-18 DIAGNOSIS — E78.00 PURE HYPERCHOLESTEROLEMIA: ICD-10-CM

## 2023-01-18 DIAGNOSIS — Z79.899 ENCOUNTER FOR LONG-TERM (CURRENT) USE OF MEDICATIONS: Primary | ICD-10-CM

## 2023-01-18 DIAGNOSIS — N40.1 BENIGN PROSTATIC HYPERPLASIA (BPH) WITH URINARY URGENCY: ICD-10-CM

## 2023-01-18 DIAGNOSIS — R39.15 BENIGN PROSTATIC HYPERPLASIA (BPH) WITH URINARY URGENCY: ICD-10-CM

## 2023-01-18 PROCEDURE — 99214 OFFICE O/P EST MOD 30 MIN: CPT | Performed by: FAMILY MEDICINE

## 2023-01-18 RX ORDER — SIMVASTATIN 20 MG
20 TABLET ORAL EVERY EVENING
Qty: 90 TABLET | Refills: 3 | Status: SHIPPED | OUTPATIENT
Start: 2023-01-18 | End: 2023-06-06

## 2023-01-18 RX ORDER — GABAPENTIN 300 MG/1
600 CAPSULE ORAL 3 TIMES DAILY
Qty: 180 CAPSULE | Refills: 3 | Status: SHIPPED | OUTPATIENT
Start: 2023-01-18 | End: 2023-05-04

## 2023-01-18 RX ORDER — POLYETHYLENE GLYCOL 3350 17 G/17G
1 POWDER, FOR SOLUTION ORAL DAILY
Qty: 850 G | Refills: 11 | Status: SHIPPED | OUTPATIENT
Start: 2023-01-18 | End: 2023-06-06

## 2023-01-18 RX ORDER — DULOXETIN HYDROCHLORIDE 60 MG/1
60 CAPSULE, DELAYED RELEASE ORAL DAILY
Qty: 90 CAPSULE | Refills: 3 | Status: SHIPPED | OUTPATIENT
Start: 2023-01-18 | End: 2023-12-06

## 2023-01-18 RX ORDER — TAMSULOSIN HYDROCHLORIDE 0.4 MG/1
CAPSULE ORAL
Qty: 90 CAPSULE | Refills: 3 | Status: SHIPPED | OUTPATIENT
Start: 2023-01-18 | End: 2023-06-06

## 2023-01-18 NOTE — PROGRESS NOTES
Assessment & Plan     Encounter for long-term (current) use of medications  Medications reviewed with the patient he is scheduled to go on a trip to [facility] in approximately 4 weeks for 2 months and he wanted to make sure he was taking medications correctly    Neuritis of left lower extremity    Minimal discomfort noted over the left leg he says his pain is usually worse in the wintertime but he still not having problems with ambulating while taking the gabapentin  - gabapentin (NEURONTIN) 300 MG capsule; Take 2 capsules (600 mg) by mouth 3 times daily    Pure hypercholesterolemia    Taking Zocor we will recheck a cholesterol panel when he gets back he has changed his diet  - simvastatin (ZOCOR) 20 MG tablet; Take 1 tablet (20 mg) by mouth every evening    Lumbar Canal Stenosis    No central pain over the lower portion of his spinal cord he can bend his back without any discomfort.  - DULoxetine (CYMBALTA) 60 MG capsule; Take 1 capsule (60 mg) by mouth daily    Benign prostatic hyperplasia (BPH) with urinary urgency    Regular urinary flow no pain or discomfort he only gets up once at bedtime  - tamsulosin (FLOMAX) 0.4 MG capsule; [TAMSULOSIN (FLOMAX) 0.4 MG CAP] TAKE 1 CAPSULE (0.4 MG TOTAL) BY MOUTH DAILY AFTER LUNCH.    Chronic idiopathic constipation    No constipation issues noted at present    Slow transit constipation    - polyethylene glycol (MIRALAX) 17 GM/Dose powder; Take 17 g (1 capful) by mouth daily    Encounter for medication refill    - polyethylene glycol (MIRALAX) 17 GM/Dose powder; Take 17 g (1 capful) by mouth daily    Primary osteoarthritis of right knee    No knee pain noted  - DULoxetine (CYMBALTA) 60 MG capsule; Take 1 capsule (60 mg) by mouth daily    Spinal stenosis, lumbar region, without neurogenic claudication    - DULoxetine (CYMBALTA) 60 MG capsule; Take 1 capsule (60 mg) by mouth daily    62694}     BMI:   Estimated body mass index is 28.71 kg/m  as calculated from the following:     Height as of this encounter: 1.524 m (5').    Weight as of this encounter: 66.7 kg (147 lb).           Return in about 4 months (around 5/18/2023) for lumbar back pain.    Shree Dyson MD  [facility] NEISHA Vee is a 72 year old accompanied by his daughter, presenting for the following health issues:  Recheck Medication  72-year-old male here with lumbar radiculopathy secondary to lumbar canal stenosis and BPH.  He also has hyperlipidemia.  He is accompanied by his daughter today he states he has been able to get all his medications regularly he says his pain is worse in the wintertime but has been able to walk he notes no discomfort while urinating and has been able to sleep well.  He wants a record of his immunizations and his medications as he is planning to leave for [facility] on a vacation in approximately 3 weeks.  He already has a travel clinic visit scheduled.    History of Present Illness       Reason for visit:  Medication  Follow-up              Review of Systems   Constitutional, HEENT, cardiovascular, pulmonary, gi and gu systems are negative, except as otherwise noted.      Objective    /82 (BP Location: Right arm, Patient Position: Sitting, Cuff Size: Adult Regular)   Pulse 74   Temp 97.9  F (36.6  C) (Oral)   Ht 1.524 m (5')   Wt 66.7 kg (147 lb)   SpO2 93%   BMI 28.71 kg/m    Body mass index is 28.71 kg/m .  Physical Exam   GENERAL: healthy, alert and no distress  EYES: Eyes grossly normal to inspection, PERRL and conjunctivae and sclerae normal  NECK: no adenopathy, no asymmetry, masses, or scars and thyroid normal to palpation  RESP: lungs clear to auscultation - no rales, rhonchi or wheezes  CV: regular rate and rhythm, normal S1 S2, no S3 or S4, no murmur, click or rub, no peripheral edema and peripheral pulses strong  MS: No tenderness noted when palpating the lumbar thoracic spine forward flexion of the lumbar spine limited to 30 degrees with no  discomfort  NEURO: Normal strength and tone, mentation intact and speech normal  PSYCH: mentation appears normal, affect normal/bright

## 2023-01-30 ENCOUNTER — OFFICE VISIT (OUTPATIENT)
Dept: FAMILY MEDICINE | Facility: CLINIC | Age: 73
End: 2023-01-30
Payer: COMMERCIAL

## 2023-01-30 VITALS
SYSTOLIC BLOOD PRESSURE: 154 MMHG | HEART RATE: 60 BPM | DIASTOLIC BLOOD PRESSURE: 79 MMHG | OXYGEN SATURATION: 96 % | BODY MASS INDEX: 29.84 KG/M2 | RESPIRATION RATE: 16 BRPM | WEIGHT: 152 LBS | HEIGHT: 60 IN | TEMPERATURE: 97.7 F

## 2023-01-30 DIAGNOSIS — Z79.899 ENCOUNTER FOR LONG-TERM (CURRENT) USE OF MEDICATIONS: ICD-10-CM

## 2023-01-30 DIAGNOSIS — Z71.84 TRAVEL ADVICE ENCOUNTER: Primary | ICD-10-CM

## 2023-01-30 PROCEDURE — 91313 COVID-19 VACCINE BIVALENT BOOSTER 18+ (MODERNA): CPT | Performed by: NURSE PRACTITIONER

## 2023-01-30 PROCEDURE — 90471 IMMUNIZATION ADMIN: CPT | Performed by: NURSE PRACTITIONER

## 2023-01-30 PROCEDURE — 0134A COVID-19 VACCINE BIVALENT BOOSTER 18+ (MODERNA): CPT | Performed by: NURSE PRACTITIONER

## 2023-01-30 PROCEDURE — 99402 PREV MED CNSL INDIV APPRX 30: CPT | Mod: 25 | Performed by: NURSE PRACTITIONER

## 2023-01-30 PROCEDURE — 90691 TYPHOID VACCINE IM: CPT | Performed by: NURSE PRACTITIONER

## 2023-01-30 RX ORDER — AZITHROMYCIN 500 MG/1
500 TABLET, FILM COATED ORAL DAILY
Qty: 3 TABLET | Refills: 0 | Status: SHIPPED | OUTPATIENT
Start: 2023-01-30 | End: 2023-02-02

## 2023-01-30 RX ORDER — ATOVAQUONE AND PROGUANIL HYDROCHLORIDE 250; 100 MG/1; MG/1
1 TABLET, FILM COATED ORAL DAILY
Qty: 30 TABLET | Refills: 0 | Status: SHIPPED | OUTPATIENT
Start: 2023-01-30 | End: 2023-04-14

## 2023-01-30 ASSESSMENT — PAIN SCALES - GENERAL: PAINLEVEL: NO PAIN (0)

## 2023-01-30 NOTE — PROGRESS NOTES
"Nurse Note ( Pre-Travel Consult)      Itinerary:  [place]      Departure Date: 2/04      Return Date: ***      Length of Trip ***      Reason for Travel: { purpose:046973}           Urban or rural: {urbanrural:071785:o}      Accommodations: { Accommodations:547786}        IMMUNIZATION HISTORY  Have you received any immunizations within the past 4 weeks?  {YES / NO (NO DEFAULT):903895::\"No\"}  Have you ever fainted from having your blood drawn or from an injection?  {YES / NO (NO DEFAULT):180798::\"No\"}  Have you ever had a fever reaction to vaccination?  {YES / NO (NO DEFAULT):642369::\"No\"}  Have you ever had any bad reaction or side effect from any vaccination?  {YES / NO (NO DEFAULT):616651::\"No\"}  Have you ever had hepatitis A or B vaccine?  {YES / NO (NO DEFAULT):794008::\"No\"}  Do you live (or work closely) with anyone who has AIDS, an AIDS-like condition, any other immune disorder or who is on chemotherapy for cancer?  {YES / NO (NO DEFAULT):893931::\"No\"}  Do you have a family history of immunodeficiency?  {YES / NO (NO DEFAULT):803073::\"No\"}  Have you received any injection of immune globulin or any blood products during the past 12 months?  {YES / NO (NO DEFAULT):754273::\"No\"}    Patient roomed by ***    Subjective  Mariusz Hebert is a 72 year old male seen today { :899659} for counsultation for international travel.   Patient will be departing in {DAYS/WEEK(S)/MONTH(S):321593} and  traveling { Travel :061780}.      Patient itinerary :  will be in the *** region of *** which risk for { Diseases:772447}. exposure.      Patient's activities will include { Activities:865855}.    Patient's country of birth is { Country of birth:[address]}    Special medical concerns: {581794:253202}  Pre-travel questionnaire was completed by patient and reviewed by provider.     Vitals: There were no vitals taken for this visit.  BMI= There is no height or weight on file to calculate BMI.    EXAM:  General:  " Well-nourished, well-developed in no acute distress.  Appears to be stated age, interacts appropriately and expresses understanding of information given to patient.    Current Outpatient Medications   Medication Sig Dispense Refill     acetaminophen (TYLENOL) 325 MG tablet Take 2 tablets (650 mg) by mouth every 4 hours as needed for other (mild pain) 100 tablet 0     acetaminophen (TYLENOL) 325 MG tablet [ACETAMINOPHEN (TYLENOL) 325 MG TABLET] Take 2 tablets (650 mg total) by mouth every 6 (six) hours as needed. 100 tablet 12     aspirin 81 MG EC tablet Take 1 tablet (81 mg) by mouth 2 times daily 60 tablet 0     celecoxib (CELEBREX) 200 MG capsule Take 1 capsule (200 mg) by mouth daily 30 capsule 11     cetirizine (ZYRTEC) 10 MG tablet [CETIRIZINE (ZYRTEC) 10 MG TABLET] TAKE ONE TABLET daily 90 tablet 4     DULoxetine (CYMBALTA) 60 MG capsule Take 1 capsule (60 mg) by mouth daily 90 capsule 3     gabapentin (NEURONTIN) 300 MG capsule Take 2 capsules (600 mg) by mouth 3 times daily 180 capsule 3     hydrOXYzine (ATARAX) 10 MG tablet Take 1 tablet (10 mg) by mouth every 6 hours as needed for itching or anxiety (with pain, moderate pain) 30 tablet 0     ketotifen (ZADITOR) 0.025 % ophthalmic solution Place 1 drop into both eyes daily as needed for itching 10 mL 4     oxyCODONE (ROXICODONE) 5 MG tablet Take 0.5-1 tablets (2.5-5 mg) by mouth every 4 hours as needed for moderate to severe pain 30 tablet 0     polyethylene glycol (MIRALAX) 17 GM/Dose powder Take 17 g (1 capful) by mouth daily 850 g 11     senna-docusate (SENOKOT-S/PERICOLACE) 8.6-50 MG tablet Take 1-2 tablets by mouth 2 times daily Take while on oral narcotics to prevent or treat constipation. 30 tablet 1     simvastatin (ZOCOR) 20 MG tablet Take 1 tablet (20 mg) by mouth every evening 90 tablet 3     tamsulosin (FLOMAX) 0.4 MG capsule [TAMSULOSIN (FLOMAX) 0.4 MG CAP] TAKE 1 CAPSULE (0.4 MG TOTAL) BY MOUTH DAILY AFTER LUNCH. 90 capsule 3     Patient  Active Problem List   Diagnosis     Diverticulosis     Benign prostatic hyperplasia (BPH) with urinary urgency     Drip Or Drainage Down Throat From Above     Constipation     Cervicalgia     Urinary Incontinence     Atopic Dermatitis     Lumbar Canal Stenosis     Neuritis of left lower extremity     Lower Back Pain     Pain of right lower extremity     Meniscus, lateral, posterior horn derangement, right     Osteoarthritis of right knee     Chondromalacia of knee, right     Pure hypercholesterolemia     Sinusitis, unspecified chronicity, unspecified location     Nausea and vomiting, intractability of vomiting not specified, unspecified vomiting type     Primary osteoarthritis of left knee     Allergies   Allergen Reactions     Tramadol Nausea, Dizziness and Headache         Immunizations discussed include:   Covid 19: { IMMUNIZATIONS:164300}  Hepatitis A:  { IMMUNIZATIONS:796984}  Hepatitis B: { IMMUNIZATIONS:304320}  Influenza: { IMMUNIZATIONS:283099}  Typhoid: { IMMUNIZATIONS:927861}  Rabies: { IMMUNIZATIONS:470424}  Yellow Fever: { Yellow Fever:997005}  Paraguayan Encephalitis: { Par[place] Encephalitis:443279}  Meningococcus: { IMMUNIZATIONS:269149}  Tetanus/Diphtheria: { IMMUNIZATIONS:626134}  Measles/Mumps/Rubella: { MMR:218643}  Cholera: { Cholera:103872}  Polio: { IMMUNIZATIONS:681602}  Pneumococcal: { Pneumococcal:459438}  Varicella: { IMMUNIZATIONS:685814}  Shingrix: { IMMUNIZATIONS:055904}  HPV:  { IMMUNIZATIONS:360957}     TB: ***    Altitude Exposure on this trip: ***  Past tolerance to Altitude: ***    ASSESSMENT/PLAN:  {Diag Picklist:519931}  I have reviewed general recommendations for safe travel   including: food/water precautions, insect precautions, safer sex   practices given high prevalence of Zika, HIV and other STDs,   roadway safety. Educational materials and [employer] report provided.    Mal[place]ia prophylaxis recommended: {PTMALARIAPX:246211}  Symptomatic treatment  for traveler's diarrhea: {PTTRAVELDIARRHEA:435586:x}  Altitude illness prevention and treatment: ***      Evacuation insurance advised and resources were provided to patient.    Total visit time {MINUTES:801604} minutes  with over 50% of time spent counseling patient and shared decision making as detailed above.    Laurie Strong CNP  Certificate in Travel Health

## 2023-01-30 NOTE — NURSING NOTE
Prior to immunization administration, verified patients identity using patient s name and date of birth. Please see Immunization Activity for additional information.     Screening Questionnaire for Adult Immunization    Are you sick today?   No   Do you have allergies to medications, food, a vaccine component or latex?   No   Have you ever had a serious reaction after receiving a vaccination?   No   Do you have a long-term health problem with heart, lung, kidney, or metabolic disease (e.g., diabetes), asthma, a blood disorder, no spleen, complement component deficiency, a cochlear implant, or a spinal fluid leak?  Are you on long-term aspirin therapy?   No   Do you have cancer, leukemia, HIV/AIDS, or any other immune system problem?   No   Do you have a parent, brother, or sister with an immune system problem?   No   In the past 3 months, have you taken medications that affect  your immune system, such as prednisone, other steroids, or anticancer drugs; drugs for the treatment of rheumatoid arthritis, Crohn s disease, or psoriasis; or have you had radiation treatments?   No   Have you had a seizure, or a brain or other nervous system problem?   No   During the past year, have you received a transfusion of blood or blood    products, or been given immune (gamma) globulin or antiviral drug?   No   For women: Are you pregnant or is there a chance you could become       pregnant during the next month?   No   Have you received any vaccinations in the past 4 weeks?   No     Immunization questionnaire answers were all negative.        Per orders of Dr. Strong, injection of typhoid and moderna booster given by Marina Pineda. Patient instructed to remain in clinic for 15 minutes afterwards, and to report any adverse reaction to me immediately.       Screening performed by Marina Pineda on 1/30/2023 at 12:06 PM.

## 2023-01-30 NOTE — PROGRESS NOTES
Nurse Note ( Pre-Travel Consult)      Itinerary:  [place]      Departure Date: 02/04/2023      Return Date: 02/27/2023      Length of Trip 3 weeks      Reason for Travel: Tourism           Urban or rural: rural      Accommodations: Hotel        IMMUNIZATION HISTORY  Have you received any immunizations within the past 4 weeks?  No  Have you ever fainted from having your blood drawn or from an injection?  No  Have you ever had a fever reaction to vaccination?  No  Have you ever had any bad reaction or side effect from any vaccination?  No  Have you ever had hepatitis A or B vaccine?  Yes  Do you live (or work closely) with anyone who has AIDS, an AIDS-like condition, any other immune disorder or who is on chemotherapy for cancer?  No  Do you have a family history of immunodeficiency?  No  Have you received any injection of immune globulin or any blood products during the past 12 months?  No    Patient roomed by QUINTON Vieyra  Mariusz Hebert is a 72 year old male  seen today with spouse  Daughter and  for counsultation for international travel.   Patient will be departing in  5 day(s) and  traveling with spouse and group tour .      Patient itinerary :  will be in the unknown region closer to the [facility] border which risk for Dengue Fever, food borne illnesses and motor vehicle accidents. exposure.      Patient's activities will include sightseeing, travel by car or other vehicle and visiting temples .    Patient's country [place] birth [place]    Special medical concerns: arthritis  Pre-travel questionnaire was completed by patient and reviewed by provider.     Vitals: BP (!) 154/79 (BP Location: Right arm, Patient Position: Sitting, Cuff Size: Adult Regular)   Pulse 60   Temp 97.7  F (36.5  C) (Temporal)   Resp 16   Ht 1.524 m (5')   Wt 68.9 kg (152 lb)   SpO2 96%   BMI 29.69 kg/m    BMI= Body mass index is 29.69 kg/m .    EXAM:  General:  Well-nourished, well-developed in no acute  distress.  Appears to be stated age, interacts appropriately and expresses understanding of information given to patient.    Current Outpatient Medications   Medication Sig Dispense Refill     acetaminophen (TYLENOL) 325 MG tablet Take 2 tablets (650 mg) by mouth every 4 hours as needed for other (mild pain) 100 tablet 0     acetaminophen (TYLENOL) 325 MG tablet [ACETAMINOPHEN (TYLENOL) 325 MG TABLET] Take 2 tablets (650 mg total) by mouth every 6 (six) hours as needed. 100 tablet 12     aspirin 81 MG EC tablet Take 1 tablet (81 mg) by mouth 2 times daily 60 tablet 0     celecoxib (CELEBREX) 200 MG capsule Take 1 capsule (200 mg) by mouth daily 30 capsule 11     cetirizine (ZYRTEC) 10 MG tablet [CETIRIZINE (ZYRTEC) 10 MG TABLET] TAKE ONE TABLET daily 90 tablet 4     DULoxetine (CYMBALTA) 60 MG capsule Take 1 capsule (60 mg) by mouth daily 90 capsule 3     gabapentin (NEURONTIN) 300 MG capsule Take 2 capsules (600 mg) by mouth 3 times daily 180 capsule 3     hydrOXYzine (ATARAX) 10 MG tablet Take 1 tablet (10 mg) by mouth every 6 hours as needed for itching or anxiety (with pain, moderate pain) 30 tablet 0     ketotifen (ZADITOR) 0.025 % ophthalmic solution Place 1 drop into both eyes daily as needed for itching 10 mL 4     oxyCODONE (ROXICODONE) 5 MG tablet Take 0.5-1 tablets (2.5-5 mg) by mouth every 4 hours as needed for moderate to severe pain 30 tablet 0     polyethylene glycol (MIRALAX) 17 GM/Dose powder Take 17 g (1 capful) by mouth daily 850 g 11     senna-docusate (SENOKOT-S/PERICOLACE) 8.6-50 MG tablet Take 1-2 tablets by mouth 2 times daily Take while on oral narcotics to prevent or treat constipation. 30 tablet 1     simvastatin (ZOCOR) 20 MG tablet Take 1 tablet (20 mg) by mouth every evening 90 tablet 3     tamsulosin (FLOMAX) 0.4 MG capsule [TAMSULOSIN (FLOMAX) 0.4 MG CAP] TAKE 1 CAPSULE (0.4 MG TOTAL) BY MOUTH DAILY AFTER LUNCH. 90 capsule 3     Patient Active Problem List   Diagnosis      Diverticulosis     Benign prostatic hyperplasia (BPH) with urinary urgency     Drip Or Drainage Down Throat From Above     Constipation     Cervicalgia     Urinary Incontinence     Atopic Dermatitis     Lumbar Canal Stenosis     Neuritis of left lower extremity     Lower Back Pain     Pain of right lower extremity     Meniscus, lateral, posterior horn derangement, right     Osteoarthritis of right knee     Chondromalacia of knee, right     Pure hypercholesterolemia     Sinusitis, unspecified chronicity, unspecified location     Nausea and vomiting, intractability of vomiting not specified, unspecified vomiting type     Primary osteoarthritis of left knee     Allergies   Allergen Reactions     Tramadol Nausea, Dizziness and Headache         Immunizations discussed include:   Covid 19: Ordered/given today, risks, benefits and side effects reviewed  Hepatitis A:  Up to date  Hepatitis B: Up to date  Influenza: Up to date  Typhoid: Ordered/given today, risks, benefits and side effects reviewed  Rabies: Declined  reviewed managment of a animal bite or scratch (washing wound, seek medical care within 24 hours for post exposure prophylaxis )  Yellow Fever: Not indicated  Japanese Encephalitis: Not indicated - risk of disease is minimal off season  Meningococcus: Not indicated  Tetanus/Diphtheria: Up to date  Measles/Mumps/Rubella: Up to date  Cholera: Not needed and Not available  Polio: Not indicated  Pneumococcal: Up to date  Varicella: Immune by disease history per patient report  Shingrix: deferred  HPV:  Not indicated     TB: lowrisk     Altitude Exposure on this trip: no  Past tolerance to Altitude: na    ASSESSMENT/PLAN:  Mariusz was seen today for travel clinic.    Diagnoses and all orders for this visit:    Travel advice encounter  -     COVID-19 VACCINE BIVALENT BOOSTER 18+ (MODERNA)  -     TYPHOID VACCINE, IM    Encounter for long-term (current) use of medications      I have reviewed general recommendations for  safe travel   including: food/water precautions, insect precautions,,   roadway safety. Educational materials and [employer] report provided.    Malaraia prophylaxis recommended: Malarone  Symptomatic treatment for traveler's diarrhea: azithromycin      Evacuation insurance advised and resources were provided to patient.    Total visit time 30 minutes  with over 50% of time spent counseling patient and shared decision making as detailed above.    Laurie Strong CNP  Certificate in Travel Health

## 2023-01-30 NOTE — PATIENT INSTRUCTIONS
Thank you for visiting the [facility] : [phone]  Today January 30, 2023 you received the    Typhoid - injectable. This vaccine is valid for two years.     Covid moderna bivalent     Follow up vaccine appointments can be made as a NURSE ONLY visit at the Travel Clinic, (BE PREPARED TO WAIT, ) or at designated [facility].    If you are receiving the Rabies vaccines series, it is important that you follow the exact schedule ordered.     Pre-travel     We recommend that you purchase Medical Evacuation Insurance prior to your departure.  Https://wwwnc.cdc.gov/travel/page/insurance    [place] your travel plans with the  Department of [employer] through STEP ( Smart Traveler Enrollment Program ) https://step.state.gov.  STEP is a free service to allow U[place] citizens and nationals traveling and living abroad to enroll their trip with the nearest U.S. Embassy or Consulate.    Animal Exposure: Avoid all mammals even if they look healthy.  If there is a bite, scratch or even a lick, wash area immediately with soap and water for 15 minutes and seek medical care within 24 hours for evaluation of Rabies post exposure treatment.  Contact your Medical Evacuation Insurance.    COVID 19 (Sars Cov2) prevention strategies  Physical distancing: Maintain 6 foot (2m) from others.              Avoid large gatherings and public transportation.   Avoid indoor shopping malls, theaters and restaurants   Practice consistent mask wearing covering the nose, mouth and underneath the chin when unable to maintain 6 foot distance from others.  Hand washing: frequent, thorough handwashing with soap and water for 20 seconds (or using a hand  containing 60% alcohol)   Avoid touching face, nose, eyes, mouth unless you have done appropriate hand washing as above.   Clean high touch surfaces with approved disinfectant against Covid 19  (70% Ethanol ) or a bleach solution (add 20 mL (4 teaspoons)  of bleach to 1 L (1 quart) of water;)  Be careful not to breath or touch bleach.      Travel Covid 19 Testing:  updated 12/06/2021  International travelers: Pre-travel: diagnostic testing (antigen or PCR) may be required for entry:  See country specific Embassy websites or airline websites.    Post travel: CDC recommends getting tested 3-5 days after your trip     COVID-19 testing scheduling number for pre-travel through [facility]  [phone] (Must have an order). Available 24 hours a day.  You can also schedule through My Chart.     Post-travel illness:  Contact your provider or [facility] if you develop a fever, rash, cough, diarrhea or other symptoms for up to 1 year after travel.  Inform your healthcare provider when and where you traveled to.    Please call the [facility] with any questions [phone]  Or send your provider a 'My Chart' note.

## 2023-04-14 ENCOUNTER — OFFICE VISIT (OUTPATIENT)
Dept: FAMILY MEDICINE | Facility: CLINIC | Age: 73
End: 2023-04-14
Payer: COMMERCIAL

## 2023-04-14 ENCOUNTER — HOSPITAL ENCOUNTER (OUTPATIENT)
Dept: GENERAL RADIOLOGY | Facility: HOSPITAL | Age: 73
Discharge: HOME | End: 2023-04-14
Attending: FAMILY MEDICINE | Admitting: FAMILY MEDICINE
Payer: COMMERCIAL

## 2023-04-14 VITALS
SYSTOLIC BLOOD PRESSURE: 155 MMHG | TEMPERATURE: 97.9 F | HEART RATE: 69 BPM | RESPIRATION RATE: 20 BRPM | DIASTOLIC BLOOD PRESSURE: 77 MMHG | OXYGEN SATURATION: 97 % | WEIGHT: 150.5 LBS | BODY MASS INDEX: 29.39 KG/M2

## 2023-04-14 DIAGNOSIS — S89.91XA KNEE INJURY, RIGHT, INITIAL ENCOUNTER: Primary | ICD-10-CM

## 2023-04-14 DIAGNOSIS — M17.11 PRIMARY OSTEOARTHRITIS OF RIGHT KNEE: ICD-10-CM

## 2023-04-14 DIAGNOSIS — S86.911A STRAIN OF RIGHT KNEE, INITIAL ENCOUNTER: ICD-10-CM

## 2023-04-14 PROCEDURE — 73560 X-RAY EXAM OF KNEE 1 OR 2: CPT | Mod: RT

## 2023-04-14 PROCEDURE — 99213 OFFICE O/P EST LOW 20 MIN: CPT | Performed by: FAMILY MEDICINE

## 2023-04-14 NOTE — PROGRESS NOTES
OUTPATIENT VISIT NOTE                                                   Date of Visit: 4/14/2023     Chief Complaint   Patient presents with:  Musculoskeletal Problem: Rt knee x 3 days. Some swelling, unable to straighten leg per pt daughter, pain radiates back of knee. Slipped on stair per pt.             History of Present Illness   Mariusz Hebert is a 73 year old male with daughter and phone  c/o left leg pain for the last three days.  Slipped on stairs about three days ago.  No redness.  Some swelling.       MEDICATIONS   Current Outpatient Medications   Medication     aspirin 81 MG EC tablet     celecoxib (CELEBREX) 200 MG capsule     cetirizine (ZYRTEC) 10 MG tablet     DULoxetine (CYMBALTA) 60 MG capsule     gabapentin (NEURONTIN) 300 MG capsule     hydrOXYzine (ATARAX) 10 MG tablet     simvastatin (ZOCOR) 20 MG tablet     tamsulosin (FLOMAX) 0.4 MG capsule     acetaminophen (TYLENOL) 325 MG tablet     ketotifen (ZADITOR) 0.025 % ophthalmic solution     oxyCODONE (ROXICODONE) 5 MG tablet     polyethylene glycol (MIRALAX) 17 GM/Dose powder     senna-docusate (SENOKOT-S/PERICOLACE) 8.6-50 MG tablet     No current facility-administered medications for this visit.         SOCIAL HISTORY   Social History     Tobacco Use     Smoking status: Never     Passive exposure: Never     Smokeless tobacco: Former     Types: Chew   Vaping Use     Vaping status: Never Used   Substance Use Topics     Alcohol use: Not Currently           Physical Exam   Vitals:    04/14/23 1115   BP: (!) 155/77   BP Location: Right arm   Patient Position: Sitting   Cuff Size: Adult Regular   Pulse: 69   Resp: 20   Temp: 97.9  F (36.6  C)   TempSrc: Oral   SpO2: 97%   Weight: 68.3 kg (150 lb 8 oz)        GEN:  nad  Right Knee: no erythema, maybe has slight effusion.  Mild tenderness over the patella.  Slight tenderness above the knee.  No tenderness along the joint lines.  No ligamental laxity.     Diagnostic Studies   LABS:  Results for  orders placed or performed in visit on 04/14/23   XR Knee Right 1/2 Views     Status: None (Preliminary result)    Narrative    EXAM: XR KNEE RIGHT 1/2 VIEWS  LOCATION: [facility]  DATE/TIME: 4/14/2023 12:01 PM CDT    INDICATION: Twisted knee. Pain over patella and above knee cap.  COMPARISON: None.      Impression    IMPRESSION: Severe degenerative change at the medial compartment of the right knee. Osteophytic spurring all 3 compartments. Additional degenerative narrowing patellofemoral compartment. Small right knee joint effusion. Chronic appearing calcifications   potentially loose within the joint within the suprapatellar portion of the joint. No evidence for acute fracture.            Assessment and Plan     Knee injury, right, initial encounter    - XR Knee Right 1/2 Views    Primary osteoarthritis of right knee      Strain of right knee, initial encounter  Strain of right knee.  Has known arthritis of knee and this likely flared it up.    Ice several times a day.    Knee sleeve.    voltaren gel twice a day.                   Discussed signs / symptoms that warrant urgent / emergent medical attention.     Recheck if worsening or not improving.       Reg Blankenship MD          Pertinent History     The following portions of the patient's history were reviewed and updated as appropriate: allergies, current medications, past family history, past medical history, past social history, past surgical history and problem list.

## 2023-04-14 NOTE — PATIENT INSTRUCTIONS
Ice knee three times a day for 15 minutes.    Use knee sleeve.    Apply gel to knee four times a day.

## 2023-04-21 ENCOUNTER — PATIENT OUTREACH (OUTPATIENT)
Dept: GERIATRIC MEDICINE | Facility: CLINIC | Age: 73
End: 2023-04-21

## 2023-04-21 NOTE — PROGRESS NOTES
[facility] Care Coordination Contact    Care Coordinator tried calling member with interpeter for 6 mos call, no answer.     Care Coordinator tasked CMS to mail UTR letter.     JANNETH Gonzalez  [facility]  Phone: [phone]

## 2023-04-21 NOTE — LETTER
April 24, 2023    CHARO PENA  [address] [place] [address]    Dear Charo:     I m your care coordinator. I ve been unable to reach you by phone. I am writing to ask you or your authorized representative to call me at [phone]. If you reach my voicemail, leave a message with your daytime phone number. Include a date and time that I can call you. If you are hearing impaired, call the [place] Relay at 261 or [phone] (vjlstr-it-gizxrl relay service).    The reason I am trying to reach you is:     [] To schedule an assessment  [x] For your six (6)-month check-in  [] Other:      Please call me as soon as you receive this letter. I look forward to speaking with you.    Sincerely,    JANNETH Gonzalez  [phone]  [email or web address]        [id number] accepted  [id number]                                                                        B  (08/2022)

## 2023-04-24 NOTE — PROGRESS NOTES
"Per CC, mailed client an \"Unable to Contact\" letter.    Ally Melchor  Case Management Specialist  [facility]  [phone]    "

## 2023-05-04 DIAGNOSIS — G57.92 NEURITIS OF LEFT LOWER EXTREMITY: ICD-10-CM

## 2023-05-04 RX ORDER — GABAPENTIN 300 MG/1
600 CAPSULE ORAL 3 TIMES DAILY
Qty: 180 CAPSULE | Refills: 3 | Status: SHIPPED | OUTPATIENT
Start: 2023-05-04 | End: 2023-09-14

## 2023-06-01 ENCOUNTER — PATIENT OUTREACH (OUTPATIENT)
Dept: GERIATRIC MEDICINE | Facility: CLINIC | Age: 73
End: 2023-06-01

## 2023-06-01 NOTE — PROGRESS NOTES
[facility] Care Coordination Contact      [facility] Six-Month Telephone Assessment    6 month telephone assessment completed on 06/01/23.    ER visits: No  Hospitalizations: No  TCU stays: No  Significant health status changes: na  Falls/Injuries: No  ADL/IADL changes: No  Changes in services: No    Caregiver Assessment follow up:  na    Goals: See POC in chart for goal progress documentation.   No changes reported by family.  Mariusz attends temple every morning in [place], [place].     Will see member in 6 months for an annual health risk assessment.   Encouraged member to call CC with any questions or concerns in the meantime.     JANNETH Gonzalez  [facility]  Phone: [phone]

## 2023-06-04 DIAGNOSIS — R09.82 POSTNASAL DRIP: ICD-10-CM

## 2023-06-04 NOTE — TELEPHONE ENCOUNTER
"Routing refill request to provider for review/approval because:  Over 64 years of age    Last Written Prescription Date:  6/2/2022  Last Fill Quantity: 90,  # refills: 4   Last office visit provider:  1/30/2023     Requested Prescriptions   Pending Prescriptions Disp Refills     cetirizine (ZYRTEC) 10 MG tablet [Pharmacy Med Name: CETIRIZINE HCL 10 MG TABS 10 Tablet] 30 tablet 4     Sig: TAKE 1 PILL BY MOUTH DAILY       Antihistamines Protocol Failed - 6/4/2023  1:07 PM        Failed - Patient is 3-64 years of age     Apply weight-based dosing for peds patients age 3 - 12 years of age.    Forward request to provider for patients under the age of 3 or over the age of 64.          Passed - Recent (12 mo) or future (30 days) visit within the authorizing provider's specialty     Patient has had an office visit with the authorizing provider or a provider within the authorizing providers department within the previous 12 mos or has a future within next 30 days. See \"Patient Info\" tab in inbasket, or \"Choose Columns\" in Meds & Orders section of the refill encounter.              Passed - Medication is active on med list             Karen Chaves RN 06/04/23 1:07 PM  "

## 2023-06-05 RX ORDER — CETIRIZINE HYDROCHLORIDE 10 MG/1
TABLET ORAL
Qty: 30 TABLET | Refills: 4 | Status: SHIPPED | OUTPATIENT
Start: 2023-06-05 | End: 2023-11-13

## 2023-06-06 ENCOUNTER — OFFICE VISIT (OUTPATIENT)
Dept: FAMILY MEDICINE | Facility: CLINIC | Age: 73
End: 2023-06-06
Payer: COMMERCIAL

## 2023-06-06 VITALS
RESPIRATION RATE: 16 BRPM | TEMPERATURE: 97.2 F | OXYGEN SATURATION: 96 % | SYSTOLIC BLOOD PRESSURE: 124 MMHG | DIASTOLIC BLOOD PRESSURE: 71 MMHG | BODY MASS INDEX: 28.89 KG/M2 | WEIGHT: 147.13 LBS | HEART RATE: 69 BPM | HEIGHT: 60 IN

## 2023-06-06 DIAGNOSIS — Z76.0 ENCOUNTER FOR MEDICATION REFILL: ICD-10-CM

## 2023-06-06 DIAGNOSIS — H10.13 ALLERGIC CONJUNCTIVITIS, BILATERAL: ICD-10-CM

## 2023-06-06 DIAGNOSIS — M48.061 SPINAL STENOSIS, LUMBAR REGION, WITHOUT NEUROGENIC CLAUDICATION: ICD-10-CM

## 2023-06-06 DIAGNOSIS — K59.01 SLOW TRANSIT CONSTIPATION: ICD-10-CM

## 2023-06-06 DIAGNOSIS — M17.11 PRIMARY OSTEOARTHRITIS OF RIGHT KNEE: ICD-10-CM

## 2023-06-06 DIAGNOSIS — K59.04 CHRONIC IDIOPATHIC CONSTIPATION: ICD-10-CM

## 2023-06-06 DIAGNOSIS — E78.00 PURE HYPERCHOLESTEROLEMIA: ICD-10-CM

## 2023-06-06 DIAGNOSIS — S86.911A STRAIN OF RIGHT KNEE, INITIAL ENCOUNTER: ICD-10-CM

## 2023-06-06 DIAGNOSIS — N40.1 BENIGN PROSTATIC HYPERPLASIA (BPH) WITH URINARY URGENCY: Primary | ICD-10-CM

## 2023-06-06 DIAGNOSIS — R39.15 BENIGN PROSTATIC HYPERPLASIA (BPH) WITH URINARY URGENCY: Primary | ICD-10-CM

## 2023-06-06 DIAGNOSIS — K08.9 DENTAL DISORDER: ICD-10-CM

## 2023-06-06 DIAGNOSIS — M17.12 PRIMARY OSTEOARTHRITIS OF LEFT KNEE: ICD-10-CM

## 2023-06-06 PROCEDURE — 99214 OFFICE O/P EST MOD 30 MIN: CPT | Performed by: FAMILY MEDICINE

## 2023-06-06 RX ORDER — CELECOXIB 200 MG/1
200 CAPSULE ORAL DAILY
Qty: 30 CAPSULE | Refills: 11 | Status: SHIPPED | OUTPATIENT
Start: 2023-06-06 | End: 2024-07-08

## 2023-06-06 RX ORDER — TAMSULOSIN HYDROCHLORIDE 0.4 MG/1
CAPSULE ORAL
Qty: 90 CAPSULE | Refills: 3 | Status: SHIPPED | OUTPATIENT
Start: 2023-06-06 | End: 2023-12-06

## 2023-06-06 RX ORDER — POLYETHYLENE GLYCOL 3350 17 G/17G
17 POWDER, FOR SOLUTION ORAL DAILY
Qty: 850 G | Refills: 11 | Status: SHIPPED | OUTPATIENT
Start: 2023-06-06 | End: 2023-09-06

## 2023-06-06 RX ORDER — SIMVASTATIN 20 MG
20 TABLET ORAL EVERY EVENING
Qty: 90 TABLET | Refills: 3 | Status: SHIPPED | OUTPATIENT
Start: 2023-06-06 | End: 2023-12-06

## 2023-06-06 NOTE — PROGRESS NOTES
Assessment & Plan     Benign prostatic hyperplasia (BPH) with urinary urgency  Currently on Flomax no side effects noted urinates without difficulty  - tamsulosin (FLOMAX) 0.4 MG capsule; [TAMSULOSIN (FLOMAX) 0.4 MG CAP] TAKE 1 CAPSULE (0.4 MG TOTAL) BY MOUTH DAILY AFTER LUNCH.    Primary osteoarthritis of right knee    Urgent care notes reviewed was given Voltaren at visit.  Taking Celebrex, duloxetine and gabapentin.  We will see orthopedics again.  Considering surgery to be underwent    Strain of right knee, initial encounter        Chronic idiopathic constipation    He is to continue medication for constipation    Lumbar Canal Stenosis    Handicap sticker given for lumbar canal stenosis pain present on lumbar spine no radiation of discomfort to left SI joint    Allergic conjunctivitis, bilateral      - ketotifen (ZADITOR) 0.025 % ophthalmic solution; Place 1 drop into both eyes daily as needed for itching    Slow transit constipation      - polyethylene glycol (MIRALAX) 17 GM/Dose powder; Take 17 g by mouth daily    Encounter for medication refill      - polyethylene glycol (MIRALAX) 17 GM/Dose powder; Take 17 g by mouth daily    Primary osteoarthritis of left knee    After surgery the pain is minimal as compared to right side  - celecoxib (CELEBREX) 200 MG capsule; Take 1 capsule (200 mg) by mouth daily    Pure hypercholesterolemia    Rechecking cholesterol in approximately 8 weeks.  Taking Zocor regularly  - simvastatin (ZOCOR) 20 MG tablet; Take 1 tablet (20 mg) by mouth every evening    Dental disorder    On a soft diet because of multiple sites of broken teeth needs to have his teeth pulled urged him to go see a dentist rather than continue his current diet as he is noticing significant oral pain      BMI:   Estimated body mass index is 28.73 kg/m  as calculated from the following:    Height as of this encounter: 1.524 m (5').    Weight as of this encounter: 66.7 kg (147 lb 2 oz).           Shree Dyson  MD [facility] NEISHA Vee is a 73 year old, presenting for the following health issues:  Follow Up (Back pain )        6/6/2023    10:37 AM   Additional Questions   Roomed by Radha Mcintosh MA   Accompanied by Daughter     CARMEN   73-year-old male here following up for knee pain constipation back pain due to lumbar canal stenosis BPH has dental issues with his daughter.  He went to the urgent care last month says that his right knee pain is now worse than his left knee pain he knows that the pain is similar to this pain he has left knee pain due to arthritis and he is probably heading for surgery he like to postpone that until the wintertime so the Voltaren given in urgent care by Dr. Villeda mildly helped.  No knee pain at rest medications work he is able to exercise at home notes no constipation as long as he takes the stool softeners.  Has been having some issues with chewing because of his dental issues he has multiple teeth that need to be removed but his daughter states that he has been resisting this and has been having rice soup for the majority of his meals.          Review of Systems   Constitutional, HEENT, cardiovascular, pulmonary, gi and gu systems are negative, except as otherwise noted.      Objective    /71   Pulse 69   Temp 97.2  F (36.2  C) (Temporal)   Resp 16   Ht 1.524 m (5')   Wt 66.7 kg (147 lb 2 oz)   SpO2 96%   BMI 28.73 kg/m    Body mass index is 28.73 kg/m .  Physical Exam   GENERAL: healthy, alert and no distress  EYES: Eyes grossly normal to inspection, PERRL and conjunctivae and sclerae normal  HENT: normal cephalic/atraumatic, ear canals and TM's normal, nose and mouth without ulcers or lesions, oropharynx clear, oral mucous membranes moist and   Multiple sites dental decay with poor oral hygiene needs to have multiple broken teeth removed.  No sites of gum involvement  NECK: no adenopathy, no asymmetry, masses, or scars and thyroid normal to  palpation  RESP: lungs clear to auscultation - no rales, rhonchi or wheezes  CV: regular rate and rhythm, normal S1 S2, no S3 or S4, no murmur, click or rub, no peripheral edema and peripheral pulses strong  ABDOMEN: soft, nontender, no hepatosplenomegaly, no masses and bowel sounds normal  MS: Tenderness elicited with palpation of the right patella flexion extension of the right knee at 30 and 45 degrees extensively cause some discomfort to radiate up into his right quadriceps drawer tests are negative Lachman's test negative bilaterally  SKIN: no suspicious lesions or rashes  NEURO: Normal strength and tone, mentation intact and speech normal  PSYCH: mentation appears normal, affect normal/bright

## 2023-06-11 NOTE — PROGRESS NOTES
Assessment:   Mariusz Hebert is a 67 y.o. y.o. male with past medical history significant for BPH, hypercholesterolemia who presents today for follow-up regarding chronic right low back pain with radiation into the right lower extremity with associated numbness and tingling.  Patient has a history of a decompressive lumbar laminectomy in 2011.  MRI lumbar spine shows degenerative spondylolisthesis at L4-5 which results in moderate to severe spinal canal stenosis  as well as severe right foraminal stenosis. The patient status post a right L4-5 transforaminal epidural steroid injection on October 12, 2017 which provided 55% relief of his pain, but that relief is waning.  The patient demonstrated slight weakness in his right ankle dorsiflexors and great toe extensors today, which I had not appreciated on previous exams.       Plan:     A shared decision making plan was used.  The patient's values and choices were respected.  The following represents what was discussed and decided upon by the physician assistant and the patient.  A professional  is present for the visit.    1.  DIAGNOSTIC TESTS: I reviewed the MRI lumbar spine.  No further diagnostic tests were ordered.    2.  PHYSICAL THERAPY: The patient completed 8 sessions of physical therapy.  I encouraged him to continue doing his home exercises on a daily basis.    3.  MEDICATIONS: No changes are made to the patient's medications.  Uses gabapentin 600 mg 3 times daily, diclofenac once per day, and Tylenol as needed.    4.  INTERVENTIONS:  I offered the patient a right L4-5, L5-S1 transforaminal epidural steroid injection.  I explained how this would be different than his previous right L4-5 transforaminal epidural steroid injection.  I believe he is symptomatic from both a right L4 and L5 nerve root compression at the L4-5 level.  Hopefully by injecting both areas he would have more significant relief.  The patient declined.    5.  PATIENT EDUCATION:  I  discussed with patient that I am concerned that he demonstrated weakness with right ankle dorsiflexion on exam today.  I  recommended that he be referred to a spine surgeon.  I explained that spine surgery would afford him the best chance of recovering strength in the right ankle.  The patient is adamant that he is not interested in surgical intervention at this time.    6.  FOLLOW-UP: I will see the patient back in the clinic in 6 weeks to follow-up.  If he has any questions or concerns in the meantime, he should not hesitate to contact our clinic.    Subjective:     Mariusz Hebert is a 67 y.o. male who presents today for follow-up regarding chronic right low back pain with radiation into the right lower extremity.  I last saw the patient on November 24, 2017.  At that time I offered a repeat right L4-5 transforaminal epidural steroid injection, but the patient wished to pursue additional physical therapy.  The patient completed 8 sessions of physical therapy.  He does not feel that he made any improvement with physical therapy.    The patient continues to complain of right-sided low back pain.  The pain radiates in the right buttock and on the lateral thigh, and into the anterior and lateral shin.  He has numbness and tingling in the same distribution as his pain, most pronounced distal to the knee in the anterolateral shin.  Patient rates his pain today as a 6 out of 10.  At its best it is a 4-10.  At its worst it is a 10 out of 10.  Patient's pain is aggravated with cold weather.  It is alleviated with taking his pain relieving medications.    The patient completed 8 sessions of physical therapy.  He does his home exercises.  He is using gabapentin 6 mg 3 times daily, diclofenac once per day, and Tylenol as needed.    Past medical history is reviewed and is unchanged in the interim.    Family history is reviewed and is unchanged.    Review of Systems:  Positive for numbness/tingling, loss of bladder control (patient  states that this is been present for years and is stable), headache, dizziness, nausea/vomiting, blurry vision, balance changes.  Negative for foot drop, weakness.     Objective:   CONSTITUTIONAL:  Vital signs as above.  No acute distress.  The patient is well nourished and well groomed.    PSYCHIATRIC:  The patient is awake, alert, oriented to person, place and time.  The patient is answering questions appropriately with clear speech.  Normal affect.  HEENT: Normocephalic, atraumatic.  Sclera clear.    SKIN:  Skin over the face, posterior torso, bilateral upper and lower extremities is clean, dry, intact without rashes.  MUSCULOSKELETAL: Patient ambulate with a cane for assistance.  The patient has 4/5 strength in the right ankle dorsiflexors and right EHL, otherwise 5/5 strength for the bilateral hip flexors, knee flexors/extensors, left ankle dorsiflexors, bilateral ankle plantar flexors.  NEUROLOGICAL: Subjective diminished/altered sensation in the right L5 dermatome.     RESULTS:  MRI lumbar spine from [facility] dated October 7, 2070 was reviewed.  This shows 8 mm of degenerative anterolisthesis of L4 on L5.  This results in moderate to severe spinal canal stenosis.  The patient also has moderate to severe spinal canal stenosis due to degenerative changes superimposed on developmental spinal canal stenosis.  At L4-5 there is moderate to severe bilateral facet arthropathy, severe right foraminal stenosis, and moderate to severe left foraminal stenosis.  Please see report for further details.              intact

## 2023-08-22 ENCOUNTER — PATIENT OUTREACH (OUTPATIENT)
Dept: GERIATRIC MEDICINE | Facility: CLINIC | Age: 73
End: 2023-08-22
Payer: COMMERCIAL

## 2023-08-22 NOTE — Clinical Note
Roverto Dyson, I am the [facility] Care Coordinator for Mariusz Hebert.   I completed Mariusz's annual reassessment for his Elderly Waiver Services through his MA plan.    He continues to be approved for PCA of 4 hours daily with incontinence supplies.    The family has no new needs at this time.  Please let me know if you have any questions.  Thanks!  JANNETH Gonzalez [facility] Phone: [phone]

## 2023-08-23 ENCOUNTER — PATIENT OUTREACH (OUTPATIENT)
Dept: GERIATRIC MEDICINE | Facility: CLINIC | Age: 73
End: 2023-08-23
Payer: COMMERCIAL

## 2023-08-24 NOTE — PROGRESS NOTES
[facility] Care Coordination Contact    Called family    to schedule annual HRA home visit. HRA has been scheduled for 08/22/23.    JANNETH Gonzalez  [facility]  Phone: [phone]

## 2023-09-01 ENCOUNTER — PATIENT OUTREACH (OUTPATIENT)
Dept: GERIATRIC MEDICINE | Facility: CLINIC | Age: 73
End: 2023-09-01
Payer: COMMERCIAL

## 2023-09-01 NOTE — PROGRESS NOTES
[facility] Care Coordination Contact    [facility]:  Emailed completed PCA assessment to [facility].  Faxed copy of PCA assessment to PCA Agency and mailed copy to member.  Faxed MD Communication to PCP.     Elkin Thomas  [facility]  Case Management Specialist  [phone]

## 2023-09-01 NOTE — LETTER
[facility]  [address] [place] [address], [address]  [place] [address]  Phone:  [phone]  Fax:  [phone]      September 1, 2023    CHARO PENA  [address] [place] [address]    Dear Charo,    Enclosed is a copy of your completed PCA Assessment and Service Plan.  This is for your records and no action is required by you.  If you have additional questions regarding your assessment please contact me at [phone]. If you feel that your needs are not being met, please contact the Clinical Supervisor at [phone].    Sincerely,    JANNETH Gonzalez  [phone]  [email or web address]            Enclosure:  Completed PCA assessment

## 2023-09-06 ENCOUNTER — OFFICE VISIT (OUTPATIENT)
Dept: FAMILY MEDICINE | Facility: CLINIC | Age: 73
End: 2023-09-06
Payer: COMMERCIAL

## 2023-09-06 VITALS
HEART RATE: 70 BPM | WEIGHT: 144.13 LBS | TEMPERATURE: 98 F | HEIGHT: 60 IN | SYSTOLIC BLOOD PRESSURE: 118 MMHG | DIASTOLIC BLOOD PRESSURE: 62 MMHG | RESPIRATION RATE: 16 BRPM | BODY MASS INDEX: 28.3 KG/M2 | OXYGEN SATURATION: 97 %

## 2023-09-06 DIAGNOSIS — N40.1 BENIGN PROSTATIC HYPERPLASIA (BPH) WITH URINARY URGENCY: ICD-10-CM

## 2023-09-06 DIAGNOSIS — M48.061 SPINAL STENOSIS, LUMBAR REGION, WITHOUT NEUROGENIC CLAUDICATION: ICD-10-CM

## 2023-09-06 DIAGNOSIS — E78.00 PURE HYPERCHOLESTEROLEMIA: ICD-10-CM

## 2023-09-06 DIAGNOSIS — M17.11 PRIMARY OSTEOARTHRITIS OF RIGHT KNEE: ICD-10-CM

## 2023-09-06 DIAGNOSIS — R39.15 BENIGN PROSTATIC HYPERPLASIA (BPH) WITH URINARY URGENCY: ICD-10-CM

## 2023-09-06 DIAGNOSIS — K59.01 SLOW TRANSIT CONSTIPATION: Primary | ICD-10-CM

## 2023-09-06 DIAGNOSIS — S86.911A STRAIN OF RIGHT KNEE, INITIAL ENCOUNTER: ICD-10-CM

## 2023-09-06 DIAGNOSIS — Z76.0 ENCOUNTER FOR MEDICATION REFILL: ICD-10-CM

## 2023-09-06 DIAGNOSIS — Z23 ENCOUNTER FOR IMMUNIZATION: ICD-10-CM

## 2023-09-06 LAB
ALBUMIN SERPL BCG-MCNC: 3.5 G/DL (ref 3.5–5.2)
ALP SERPL-CCNC: 72 U/L (ref 40–129)
ALT SERPL W P-5'-P-CCNC: 23 U/L (ref 0–70)
ANION GAP SERPL CALCULATED.3IONS-SCNC: 12 MMOL/L (ref 7–15)
AST SERPL W P-5'-P-CCNC: 27 U/L (ref 0–45)
BILIRUB SERPL-MCNC: 0.5 MG/DL
BUN SERPL-MCNC: 16.3 MG/DL (ref 8–23)
CALCIUM SERPL-MCNC: 9 MG/DL (ref 8.8–10.2)
CHLORIDE SERPL-SCNC: 104 MMOL/L (ref 98–107)
CHOLEST SERPL-MCNC: 223 MG/DL
CREAT SERPL-MCNC: 0.91 MG/DL (ref 0.67–1.17)
DEPRECATED HCO3 PLAS-SCNC: 23 MMOL/L (ref 22–29)
EGFRCR SERPLBLD CKD-EPI 2021: 90 ML/MIN/1.73M2
GLUCOSE SERPL-MCNC: 99 MG/DL (ref 70–99)
HDLC SERPL-MCNC: 52 MG/DL
LDLC SERPL CALC-MCNC: 147 MG/DL
NONHDLC SERPL-MCNC: 171 MG/DL
POTASSIUM SERPL-SCNC: 3.8 MMOL/L (ref 3.4–5.3)
PROT SERPL-MCNC: 6.9 G/DL (ref 6.4–8.3)
SODIUM SERPL-SCNC: 139 MMOL/L (ref 136–145)
TRIGL SERPL-MCNC: 121 MG/DL

## 2023-09-06 PROCEDURE — 80053 COMPREHEN METABOLIC PANEL: CPT | Performed by: FAMILY MEDICINE

## 2023-09-06 PROCEDURE — 36415 COLL VENOUS BLD VENIPUNCTURE: CPT | Performed by: FAMILY MEDICINE

## 2023-09-06 PROCEDURE — 80061 LIPID PANEL: CPT | Performed by: FAMILY MEDICINE

## 2023-09-06 PROCEDURE — 99214 OFFICE O/P EST MOD 30 MIN: CPT | Mod: 25 | Performed by: FAMILY MEDICINE

## 2023-09-06 PROCEDURE — 90662 IIV NO PRSV INCREASED AG IM: CPT | Performed by: FAMILY MEDICINE

## 2023-09-06 PROCEDURE — 90471 IMMUNIZATION ADMIN: CPT | Performed by: FAMILY MEDICINE

## 2023-09-06 RX ORDER — POLYETHYLENE GLYCOL 3350 17 G/17G
17 POWDER, FOR SOLUTION ORAL DAILY
Qty: 850 G | Refills: 11 | Status: SHIPPED | OUTPATIENT
Start: 2023-09-06 | End: 2023-12-06

## 2023-09-06 NOTE — PROGRESS NOTES
Assessment & Plan     Slow transit constipation  Patient's ran out of his medication he does suffer from constipation I refilled the medication as gabapentin tends to give him the patient  - polyethylene glycol (MIRALAX) 17 GM/Dose powder; Take 17 g by mouth daily    Benign prostatic hyperplasia (BPH) with urinary urgency    Medications working well he has very infrequent accidents and can make it to the bathroom without urinating on himself.  - Comprehensive metabolic panel (BMP + Alb, Alk Phos, ALT, AST, Total. Bili, TP); Future    Primary osteoarthritis of right knee    Minimal discomfort noted in the right knee he saw orthopedics and got a steroid injection he is walking without pain    Lumbar Canal Stenosis    Has stiffness in his lower lumbar spine some radiation of discomfort to the right SI joint the duloxetine is helping with the discomfort  - Comprehensive metabolic panel (BMP + Alb, Alk Phos, ALT, AST, Total. Bili, TP); Future    Pure hypercholesterolemia    Patient's not fasting today taking his statin no side effects of been noted  - Lipid panel reflex to direct LDL Non-fasting; Future  - Comprehensive metabolic panel (BMP + Alb, Alk Phos, ALT, AST, Total. Bili, TP); Future    Strain of right knee, initial encounter        Encounter for medication refill      - polyethylene glycol (MIRALAX) 17 GM/Dose powder; Take 17 g by mouth daily    Encounter for immunization    Patient agrees for flu shot today               BMI:   Estimated body mass index is 28.15 kg/m  as calculated from the following:    Height as of this encounter: 1.524 m (5').    Weight as of this encounter: 65.4 kg (144 lb 2 oz).           Shree Dyson MD  [facility] NEISHA Vee is a 73 year old, presenting for the following health issues:  Follow Up (Lumbar back pain, neuralgia )  73-year-old gentleman accompanied by his daughter here for follow-up I saw him 3 months ago.  He reports that his been doing  well he has a medical history significant for lumbar canal stenosis, right knee pain secondary to osteoarthritis, BPH, hyperlipidemia.   -Continue home medication he saw orthopedics and had a knee injection he had a tooth removed by the dental clinic and no longer has oral pain he says the medications are working for his back pain he does have some back stiffness but can sleep well he is ambulating without difficulty at this time.      9/6/2023     7:45 AM   Additional Questions   Roomed by Marcelina CHESTER       History of Present Illness       Reason for visit:  Lab    He eats 2-3 servings of fruits and vegetables daily.He consumes 0 sweetened beverage(s) daily.He exercises with enough effort to increase his heart rate 10 to 19 minutes per day.  He exercises with enough effort to increase his heart rate 3 or less days per week.   He is taking medications regularly.               Review of Systems   Constitutional, HEENT, cardiovascular, pulmonary, gi and gu systems are negative, except as otherwise noted.      Objective    /62   Pulse 70   Temp 98  F (36.7  C) (Oral)   Resp 16   Ht 1.524 m (5')   Wt 65.4 kg (144 lb 2 oz)   SpO2 97%   BMI 28.15 kg/m    Body mass index is 28.15 kg/m .  Physical Exam   GENERAL: healthy, alert and no distress  EYES: Eyes grossly normal to inspection, PERRL and conjunctivae and sclerae normal  NECK: no adenopathy, no asymmetry, masses, or scars and thyroid normal to palpation  RESP: lungs clear to auscultation - no rales, rhonchi or wheezes  CV: regular rate and rhythm, normal S1 S2, no S3 or S4, no murmur, click or rub, no peripheral edema and peripheral pulses strong  ABDOMEN: soft, nontender, no hepatosplenomegaly, no masses and bowel sounds normal  MS: no gross musculoskeletal defects noted, no edema  SKIN: no suspicious lesions or rashes  NEURO: Normal strength and tone, mentation intact and speech normal  PSYCH: mentation appears normal, affect normal/bright

## 2023-09-14 DIAGNOSIS — G57.92 NEURITIS OF LEFT LOWER EXTREMITY: ICD-10-CM

## 2023-09-14 RX ORDER — GABAPENTIN 300 MG/1
600 CAPSULE ORAL 3 TIMES DAILY
Qty: 180 CAPSULE | Refills: 3 | Status: SHIPPED | OUTPATIENT
Start: 2023-09-14 | End: 2024-01-08

## 2023-09-14 ASSESSMENT — ACTIVITIES OF DAILY LIVING (ADL)
DEPENDENT_IADLS:: CLEANING;LAUNDRY;COOKING;SHOPPING;MEAL PREPARATION;MEDICATION MANAGEMENT;MONEY MANAGEMENT;TRANSPORTATION;INCONTINENCE

## 2023-09-14 NOTE — PROGRESS NOTES
[facility] Care Coordination Contact    [facility] Home Visit Assessment     Home visit for Health Risk Assessment with Mariusz Hebert completed on 08/22/2023    Type of residence:: Private home - stairs  Current living arrangement:: I live in a private home with family     Assessment completed with:: Patient, Family    Current Care Plan  Member currently receiving the following home care services:     Member currently receiving the following community resources: PCA, DME      Medication Review  Medication reconciliation completed in Epic: Yes  Medication set-up & administration: Family/informal caregiver sets up daily.  Family caregiver administers medications.  Medication Risk Assessment Medication (1 or more, place referral to MTM): N/A: No risk factors identified  MTM Referral Placed: No: No risk factors idenified    Mental/Behavioral Health   Depression Screening:   PHQ-2 Total Score (Adult) - Positive if 3 or more points; Administer PHQ-9 if positive: 0       Mental health DX:: No        Falls Assessment:   Fallen 2 or more times in the past year?: No   Any fall with injury in the past year?: No    ADL/IADL Dependencies:   Dependent ADLs:: Ambulation-walker, Bathing, Dressing, Grooming, Incontinence, Transfers, Toileting  Dependent IADLs:: Cleaning, Laundry, Cooking, Shopping, Meal Preparation, Medication Management, Money Management, Transportation, Incontinence    [facility] Plan sponsored benefits: Shared information re: Silver Sneakers/gym memberships, ASA, Calcium +D.    PCA Assessment completed at visit: Yes Annual PCA assessment indicated 16 hours per day of PCA. This is the same as the previous assessment.     Elderly Waiver Eligibility: Yes-will continue on EW    Care Plan & Recommendations: Care Coordinator  completed Mariusz's annual reassessment for his Elderly Waiver Services through his MA plan.    He continues to be approved for PCA of 4 hours daily with incontinence supplies.    The  family has no new needs at this time.     See [facility] for detailed assessment information.    Follow-Up Plan: Member informed of future contact, plan to f/u with member with a 6 month telephone assessment.  Contact information shared with member and family, encouraged member to call with any questions or concerns at any time.    [place] care continuum providers: Please see Snapshot and Care Management Flowsheets for Specific details of care plan.    This CC note routed to PCP, Shree Dyson.    JANNETH Gonzalez  [facility]  Phone: [phone]

## 2023-09-15 ENCOUNTER — APPOINTMENT (OUTPATIENT)
Dept: CT IMAGING | Facility: HOSPITAL | Age: 73
End: 2023-09-15
Attending: EMERGENCY MEDICINE
Payer: COMMERCIAL

## 2023-09-15 ENCOUNTER — HOSPITAL ENCOUNTER (EMERGENCY)
Facility: HOSPITAL | Age: 73
Discharge: HOME | End: 2023-09-15
Attending: EMERGENCY MEDICINE | Admitting: EMERGENCY MEDICINE
Payer: COMMERCIAL

## 2023-09-15 VITALS
DIASTOLIC BLOOD PRESSURE: 78 MMHG | TEMPERATURE: 97.9 F | OXYGEN SATURATION: 96 % | WEIGHT: 144 LBS | RESPIRATION RATE: 16 BRPM | HEART RATE: 68 BPM | BODY MASS INDEX: 28.27 KG/M2 | HEIGHT: 60 IN | SYSTOLIC BLOOD PRESSURE: 154 MMHG

## 2023-09-15 DIAGNOSIS — H53.9 VISION CHANGES: ICD-10-CM

## 2023-09-15 LAB
ANION GAP SERPL CALCULATED.3IONS-SCNC: 9 MMOL/L (ref 7–15)
BUN SERPL-MCNC: 12.9 MG/DL (ref 8–23)
CALCIUM SERPL-MCNC: 9.3 MG/DL (ref 8.8–10.2)
CHLORIDE SERPL-SCNC: 100 MMOL/L (ref 98–107)
CREAT SERPL-MCNC: 0.93 MG/DL (ref 0.67–1.17)
DEPRECATED HCO3 PLAS-SCNC: 28 MMOL/L (ref 22–29)
EGFRCR SERPLBLD CKD-EPI 2021: 87 ML/MIN/1.73M2
GLUCOSE SERPL-MCNC: 107 MG/DL (ref 70–99)
HOLD SPECIMEN: NORMAL
HOLD SPECIMEN: NORMAL
POTASSIUM SERPL-SCNC: 4.1 MMOL/L (ref 3.4–5.3)
SODIUM SERPL-SCNC: 137 MMOL/L (ref 136–145)

## 2023-09-15 PROCEDURE — 36415 COLL VENOUS BLD VENIPUNCTURE: CPT | Performed by: EMERGENCY MEDICINE

## 2023-09-15 PROCEDURE — 82310 ASSAY OF CALCIUM: CPT | Performed by: EMERGENCY MEDICINE

## 2023-09-15 PROCEDURE — 70481 CT ORBIT/EAR/FOSSA W/DYE: CPT

## 2023-09-15 PROCEDURE — 99285 EMERGENCY DEPT VISIT HI MDM: CPT | Mod: 25

## 2023-09-15 PROCEDURE — 250N000009 HC RX 250: Performed by: EMERGENCY MEDICINE

## 2023-09-15 PROCEDURE — 250N000011 HC RX IP 250 OP 636: Performed by: EMERGENCY MEDICINE

## 2023-09-15 RX ORDER — IOPAMIDOL 755 MG/ML
75 INJECTION, SOLUTION INTRAVASCULAR ONCE
Status: COMPLETED | OUTPATIENT
Start: 2023-09-15 | End: 2023-09-15

## 2023-09-15 RX ORDER — TETRACAINE HYDROCHLORIDE 5 MG/ML
1-2 SOLUTION OPHTHALMIC ONCE
Status: COMPLETED | OUTPATIENT
Start: 2023-09-15 | End: 2023-09-15

## 2023-09-15 RX ADMIN — FLUORESCEIN SODIUM 1 STRIP: 1 STRIP OPHTHALMIC at 09:50

## 2023-09-15 RX ADMIN — TETRACAINE HYDROCHLORIDE 2 DROP: 5 SOLUTION OPHTHALMIC at 09:49

## 2023-09-15 RX ADMIN — IOPAMIDOL 75 ML: 755 INJECTION, SOLUTION INTRAVENOUS at 11:07

## 2023-09-15 ASSESSMENT — ACTIVITIES OF DAILY LIVING (ADL): ADLS_ACUITY_SCORE: 35

## 2023-09-15 ASSESSMENT — VISUAL ACUITY
OD: 20/200;WITHOUT CORRECTIVE LENSES
OS: WITHOUT CORRECTIVE LENSES;LESS THAN 20/400

## 2023-09-15 NOTE — ED TRIAGE NOTES
Patient brought in by his niece who is helping interpret.  Patients eyes started swelling yesterday and have remained swollen, L>R .  States his vision feels blurry on the left. Eyes are itchy with clear drainage.

## 2023-09-15 NOTE — ED PROVIDER NOTES
EMERGENCY DEPARTMENT ENCOUNTER      NAME: Mariusz Hebert  AGE: 73 year old male  YOB: 1950  MRN: [id number]  EVALUATION DATE & TIME: 9/15/2023  9:13 AM    PCP: Shree Dyson    ED PROVIDER: Chantal Wilson MD    Chief Complaint   Patient presents with    Facial Swelling         FINAL IMPRESSION:  1. Vision changes          ED COURSE & MEDICAL DECISION MAKING:    Pertinent Labs & Imaging studies reviewed. (See chart for details)  73 year old male with history of HLD, BPH who presents to the Emergency Department for evaluation of left-sided eye irritation.  He notes that the eye is itchy, draining, red and has visual changes with blurriness.  His vision is poor, 20/100 on the right, good eye.  He is however 20/400 on the left, poor eye.  He states he follows with [facility].  It sounds like he has a history of some allergic conjunctivitis and takes Zaditor eyedrops for same.  He wears bifocals for refractive area but to his knowledge does not have any underlying ophthalmologic disease.  Pressures of the eye bilaterally today were unremarkable and this is not consistent with acute glaucoma.  On fluorescein staining he has this atypical appearance to around 5:00 on the periphery of his left cornea.  It almost looks like it starring or like a sun.  There is no jasiel abrasion and no jasiel ulcer but I have concern for potential early corneal ulcer.  He also has some edema to the superior and inferior lids, and complains of some pain with extraocular range of motion.  There is nothing clinically that looks like proptosis and frankly the lids do not have any cellulitis changes.  My concern for an orbital cellulitis is less but with his visual changes a CT of the orbits was obtained and this was negative.  Patient follows again with [facility] chronically, I was able to schedule a clinic appointment for him at 3 PM today to be evaluated further for a conjunctivitis versus early corneal ulcer given the abnormality  seen above.  Patient will be discharged and family will drive him to that ophthalmology appointment.      ED Course as of 09/15/23 1309   Fri Sep 15, 2023   0939 I met with patient for initial interview and encounter. We also discussed plan for treatment and diagnostic interventions.    0950 Rechecked with patient to perform slit lamp and woods lamp exam.   1123 CT Orbits w Contrast  CT independently interpreted by myself without signs of orbital cellulitis   1210 Reevaluated and updated patient. We discussed plan for discharge as well as supportive cares at home and reasons for return to the ED including new or worsening symptoms. All questions and concerns addressed. Patient to be discharged by RN. They are agreeable and comfortable with plan.       Medical Decision Making    History:  Supplemental history from: Family at bedside  External Record(s) reviewed: Documented in chart, if applicable.    Work Up:  Chart documentation includes differential considered and any EKGs or imaging independently interpreted by provider, see MDM  In additional to work up documented, I considered the following work up: see MDM    External consultation:  Discussion of management with another provider: N/A    Complicating factors:  Care impacted by chronic illness: Chronic Pain and Hyperlipidemia  Care affected by social determinants of health: Access to Medical Care    Disposition considerations: Discharge. No recommendations on prescription strength medication(s). See documentation for any additional details.    At the conclusion of the encounter I discussed the results of all of the tests and the disposition. The questions were answered. The patient or family acknowledged understanding and was agreeable with the care plan.    MEDICATIONS GIVEN IN THE EMERGENCY:  Medications   tetracaine (PONTOCAINE) 0.5 % ophthalmic solution 1-2 drop (2 drops Both Eyes $Given by Other 9/15/23 0697)   fluorescein (FUL-LUCIO) ophthalmic strip 1 strip  (1 strip Both Eyes $Given by Other 9/15/23 3634)   iopamidol (ISOVUE-370) solution 75 mL (75 mLs Intravenous $Given 9/15/23 5067)       NEW PRESCRIPTIONS STARTED AT TODAY'S ER VISIT  Discharge Medication List as of 9/15/2023 12:14 PM           =================================================================    HPI    Patient information was obtained from: Patient    Use of Intrepreter: Yes (Phone) - Language: Nettie Hebert is a 73 year old male with pertinent medical history of HLD and chronic back pain who presents to this ED by private car for evaluation of visual disturbance.     Patient reports acute onset of right eye swelling that began yesterday evening.  He notes that this is right eye swelling has improved, however, this morning now reports a left-sided eye swelling with accompanying blurry vision on present on his left. Also complains of bilateral itchy eyes and clear watery drainage. Of note, patient wears bifocal glasses but did not bring them here today.    There are no other medical complaints expressed at this time.    PAST MEDICAL HISTORY:  Past Medical History:   Diagnosis Date    Arthritis     Cataracts, bilateral     Lumbago     Created by Conversion     Unspecified constipation     Created by Conversion        PAST SURGICAL HISTORY:  Past Surgical History:   Procedure Laterality Date    ARTHROPLASTY KNEE Left 7/15/2021    Procedure: LEFT TOTAL KNEE ARTHROPLASTY;  Surgeon: Sami Blankenship DO;  Location: [facility] OR    IR LUMBAR EPIDURAL STEROID INJECTION  1/5/2012    IR LUMBAR EPIDURAL STEROID INJECTION  3/15/2012    MD LAP,INGUINAL HERNIA REPR,INITIAL      Description: Laparoscopy Repair Of Initial Inguinal Hernia;  Proc Date: 10/01/2009;  Comments: Dr Maxwell CORTES W/O FACETEC FORAMOT/DSKC 1/2 VRT SEG, CERVICAL      Description: Laminectomy Lumbar;  Recorded: 03/31/2011;  Comments: L4 and L5       CURRENT MEDICATIONS:    Prior to Admission Medications   Prescriptions Last Dose  Informant Patient Reported? Taking?   DULoxetine (CYMBALTA) 60 MG capsule   No No   Sig: Take 1 capsule (60 mg) by mouth daily   acetaminophen (TYLENOL) 325 MG tablet   No No   Sig: Take 2 tablets (650 mg) by mouth every 4 hours as needed for other (mild pain)   Patient not taking: Reported on 4/14/2023   aspirin 81 MG EC tablet   No No   Sig: Take 1 tablet (81 mg) by mouth 2 times daily   Patient not taking: Reported on 9/6/2023   celecoxib (CELEBREX) 200 MG capsule   No No   Sig: Take 1 capsule (200 mg) by mouth daily   cetirizine (ZYRTEC) 10 MG tablet   No No   Sig: TAKE 1 PILL BY MOUTH DAILY   diclofenac (VOLTAREN) 1 % topical gel   No No   Sig: Apply 2 g topically 4 times daily   Patient not taking: Reported on 9/6/2023   gabapentin (NEURONTIN) 300 MG capsule   No No   Sig: TAKE 2 CAPSULES (600 MG) BY MOUTH 3 TIMES DAILY   hydrOXYzine (ATARAX) 10 MG tablet   No No   Sig: Take 1 tablet (10 mg) by mouth every 6 hours as needed for itching or anxiety (with pain, moderate pain)   Patient not taking: Reported on 9/6/2023   ketotifen (ZADITOR) 0.025 % ophthalmic solution   No No   Sig: Place 1 drop into both eyes daily as needed for itching   polyethylene glycol (MIRALAX) 17 GM/Dose powder   No No   Sig: Take 17 g by mouth daily   senna-docusate (SENOKOT-S/PERICOLACE) 8.6-50 MG tablet   No No   Sig: Take 1-2 tablets by mouth 2 times daily Take while on oral narcotics to prevent or treat constipation.   Patient not taking: Reported on 4/14/2023   simvastatin (ZOCOR) 20 MG tablet   No No   Sig: Take 1 tablet (20 mg) by mouth every evening   tamsulosin (FLOMAX) 0.4 MG capsule   No No   Sig: [TAMSULOSIN (FLOMAX) 0.4 MG CAP] TAKE 1 CAPSULE (0.4 MG TOTAL) BY MOUTH DAILY AFTER LUNCH.      Facility-Administered Medications: None       ALLERGIES:  Allergies   Allergen Reactions    Tramadol Nausea, Dizziness and Headache       FAMILY HISTORY:  No family history on file.    SOCIAL HISTORY:  Social History     Tobacco Use     Smoking status: Never     Passive exposure: Never    Smokeless tobacco: Former     Types: Chew   Vaping Use    Vaping Use: Never used   Substance Use Topics    Alcohol use: Not Currently    Drug use: Not Currently        VITALS:  Patient Vitals for the past 24 hrs:   BP Temp Temp src Pulse Resp SpO2 Height Weight   09/15/23 0909 (!) 154/78 97.9  F (36.6  C) Oral 68 16 96 % 1.524 m (5') 65.3 kg (144 lb)       PHYSICAL EXAM    General Appearance: Well-appearing, well-nourished, no acute distress   Head:  Normocephalic  Eyes:  PERRL, no APD, left-sided conjunctival injection, mild edema to superior and inferior eyelids only present on the left, no associated erythema to the lids.  EOM's intact, however he does complain of some subjective pain with extraocular range of motion.  Visual acuity to the left eye is 20/400 with IOP of 22.  Visual acuity to the right eye is 20/100 with IOP of 21.  I do not appreciate any cell or flare in the anterior chamber.  With fluorescein staining there is an atypical appearance to the periphery of his left cornea at around 5:00.  There is no jasiel abrasion or ulcer but this has a atypical appearance that almost looks like starring or the sun.  There is certainly no dendritic lesions.  ENT:   membranes are moist without pallor  Neck:  Supple  Cardio:  Regular rate and rhythm  Pulm:  No respiratory distress  Extremities: Normal gait  Neuro:  Alert and oriented ×3     RADIOLOGY/LABS:  Reviewed all pertinent imaging. Please see official radiology report. All pertinent labs reviewed and interpreted.    Results for orders placed or performed during the hospital encounter of 09/15/23   CT Orbits w Contrast    Impression    IMPRESSION:   1.  No evidence for acute post-septal orbital abnormality.  2.  Mild predominantly left-sided periorbital soft tissue swelling.  3.  No evidence of a drainable fluid collection.  4.  Severe right maxillary sinus disease with characteristics of chronic  inflammation. Milder ethmoid sphenoid and left maxillary sinus disease as above.   Basic metabolic panel   Result Value Ref Range    Sodium 137 136 - 145 mmol/L    Potassium 4.1 3.4 - 5.3 mmol/L    Chloride 100 98 - 107 mmol/L    Carbon Dioxide (CO2) 28 22 - 29 mmol/L    Anion Gap 9 7 - 15 mmol/L    Urea Nitrogen 12.9 8.0 - 23.0 mg/dL    Creatinine 0.93 0.67 - 1.17 mg/dL    Calcium 9.3 8.8 - 10.2 mg/dL    Glucose 107 (H) 70 - 99 mg/dL    GFR Estimate 87 >60 mL/min/1.73m2   Extra Red Top Tube   Result Value Ref Range    Hold Specimen JIC    Extra Purple Top Tube   Result Value Ref Range    Hold Specimen JIC        PROCEDURES:    PROCEDURE: Slit lamp and Woods lamp Exam   INDICATIONS: Blurry vision   PROCEDURE PROVIDER: Dr Chantal Wilson   SITE: both eyes   CONSENT:  The risks, benefits and alternatives for this procedure were explained to the patient and verbally accepted.     MEDICATION: fluorescein stain and tetracaine   EXAM FINDINGS: Right Eye: Pressure = 21 mmHg; acuity = 20/100  Left Eye: Pressure = 22 mmHg; acuity = 20/400     COMPLICATIONS: Patient tolerated procedure well, without complication     I, Tommy Castellon, am serving as a scribe to document services personally performed by Chantal Wilson MD, based on my observations and the provider's statements to me. I, Chantal Wilson MD, attest that Tommy Castellon is acting in a scribe capacity, has observed my performance of the services and has documented them in accordance with my direction.    Chantal Wilson MD  Emergency Medicine  [facility] EMERGENCY DEPARTMENT  [address] [place] [id number] [phone]  Dept: [phone]     Chantal Wilson MD  09/15/23 1432

## 2023-09-25 ENCOUNTER — PATIENT OUTREACH (OUTPATIENT)
Dept: GERIATRIC MEDICINE | Facility: CLINIC | Age: 73
End: 2023-09-25
Payer: COMMERCIAL

## 2023-09-25 NOTE — PROGRESS NOTES
[facility] Care Coordination Contact    Received after visit chart from care coordinator.  Completed following tasks: Mailed copy of care plan to client, Mailed Safe Medication Disposal , Mailed [employer] Leave Behind Letter, Uploaded consent to communicate form(s) to Epic, and Updated services in Database    Elkin Thomas  [facility]  Case Management Specialist  [phone]

## 2023-09-25 NOTE — LETTER
September 25, 2023    CHARO PENA  [address] [place] [address]        Dear Charo:    At [facility], we re dedicated to improving your health and wellness. Enclosed is the Care Plan developed with you on 8/22/23. Please review the Care Plan carefully.    As a reminder, during your visit we talked about:  Ways to manage your physical and mental health  Using health care to maintain and improve your health   Your preventive care needs     Remember to contact your care coordinator if you:  Are hospitalized, or plan to be hospitalized   Have a fall    Have a change in your physical or mental health  Need help finding support or services    If you have questions, or don t agree with your Care Plan, call me at [phone]. You can also call me if your needs change. TTY users, call the [place] [employer] at (307) or [phone] (tzrztc-py-zrbseu relay service).    Sincerely,        JANNETH Gonzalez  [phone]  [email or web address]    [id number] accepted    [id number] (07/2022)

## 2023-11-11 DIAGNOSIS — R09.82 POSTNASAL DRIP: ICD-10-CM

## 2023-11-13 RX ORDER — CETIRIZINE HYDROCHLORIDE 10 MG/1
TABLET ORAL
Qty: 30 TABLET | Refills: 4 | Status: SHIPPED | OUTPATIENT
Start: 2023-11-13 | End: 2024-04-03

## 2023-12-06 ENCOUNTER — OFFICE VISIT (OUTPATIENT)
Dept: FAMILY MEDICINE | Facility: CLINIC | Age: 73
End: 2023-12-06
Payer: COMMERCIAL

## 2023-12-06 VITALS
HEIGHT: 60 IN | WEIGHT: 144.6 LBS | BODY MASS INDEX: 28.39 KG/M2 | TEMPERATURE: 97.8 F | HEART RATE: 63 BPM | RESPIRATION RATE: 16 BRPM | OXYGEN SATURATION: 98 % | DIASTOLIC BLOOD PRESSURE: 71 MMHG | SYSTOLIC BLOOD PRESSURE: 135 MMHG

## 2023-12-06 DIAGNOSIS — R09.82 POSTNASAL DRIP: ICD-10-CM

## 2023-12-06 DIAGNOSIS — Z76.0 ENCOUNTER FOR MEDICATION REFILL: ICD-10-CM

## 2023-12-06 DIAGNOSIS — E78.00 PURE HYPERCHOLESTEROLEMIA: ICD-10-CM

## 2023-12-06 DIAGNOSIS — G57.92 NEURITIS OF LEFT LOWER EXTREMITY: ICD-10-CM

## 2023-12-06 DIAGNOSIS — M48.061 SPINAL STENOSIS, LUMBAR REGION, WITHOUT NEUROGENIC CLAUDICATION: ICD-10-CM

## 2023-12-06 DIAGNOSIS — R39.15 BENIGN PROSTATIC HYPERPLASIA (BPH) WITH URINARY URGENCY: ICD-10-CM

## 2023-12-06 DIAGNOSIS — H10.13 ALLERGIC CONJUNCTIVITIS, BILATERAL: ICD-10-CM

## 2023-12-06 DIAGNOSIS — Z23 ENCOUNTER FOR IMMUNIZATION: ICD-10-CM

## 2023-12-06 DIAGNOSIS — N40.1 BENIGN PROSTATIC HYPERPLASIA (BPH) WITH URINARY URGENCY: ICD-10-CM

## 2023-12-06 DIAGNOSIS — K59.01 SLOW TRANSIT CONSTIPATION: ICD-10-CM

## 2023-12-06 DIAGNOSIS — M17.11 PRIMARY OSTEOARTHRITIS OF RIGHT KNEE: Primary | ICD-10-CM

## 2023-12-06 DIAGNOSIS — K59.04 CHRONIC IDIOPATHIC CONSTIPATION: ICD-10-CM

## 2023-12-06 PROCEDURE — 91320 SARSCV2 VAC 30MCG TRS-SUC IM: CPT | Performed by: FAMILY MEDICINE

## 2023-12-06 PROCEDURE — 99214 OFFICE O/P EST MOD 30 MIN: CPT | Performed by: FAMILY MEDICINE

## 2023-12-06 PROCEDURE — 90480 ADMN SARSCOV2 VAC 1/ONLY CMP: CPT | Performed by: FAMILY MEDICINE

## 2023-12-06 RX ORDER — TAMSULOSIN HYDROCHLORIDE 0.4 MG/1
CAPSULE ORAL
Qty: 90 CAPSULE | Refills: 3 | Status: SHIPPED | OUTPATIENT
Start: 2023-12-06

## 2023-12-06 RX ORDER — POLYETHYLENE GLYCOL 3350 17 G/17G
17 POWDER, FOR SOLUTION ORAL DAILY
Qty: 850 G | Refills: 11 | Status: SHIPPED | OUTPATIENT
Start: 2023-12-06

## 2023-12-06 RX ORDER — DULOXETIN HYDROCHLORIDE 60 MG/1
60 CAPSULE, DELAYED RELEASE ORAL DAILY
Qty: 90 CAPSULE | Refills: 3 | Status: SHIPPED | OUTPATIENT
Start: 2023-12-06

## 2023-12-06 RX ORDER — SIMVASTATIN 20 MG
20 TABLET ORAL EVERY EVENING
Qty: 90 TABLET | Refills: 3 | Status: SHIPPED | OUTPATIENT
Start: 2023-12-06

## 2023-12-06 NOTE — PROGRESS NOTES
Assessment & Plan     Primary osteoarthritis of right knee    - DULoxetine (CYMBALTA) 60 MG capsule; Take 1 capsule (60 mg) by mouth daily    Neuritis of left lower extremity    Normal left sided leg pain noted today.  He is not limping on his left leg.    Allergic conjunctivitis, bilateral    Refilled his medication  - ketotifen fumarate 0.035%, ketotifen 0.025%, (ZADITOR) 0.025 % ophthalmic solution; Place 1 drop into both eyes daily as needed for itching    Chronic idiopathic constipation  He is  Feeling this medication is no constipation if he does not take the medication    Lumbar Canal Stenosis    Back pain relatively well-controlled regimen that contains cl Cymbalta and gabapentin.  No new back pain noted he avoids lifting heavy objects back  - DULoxetine (CYMBALTA) 60 MG capsule; Take 1 capsule (60 mg) by mouth daily    Slow transit constipation    No constipation noted  - polyethylene glycol (MIRALAX) 17 GM/Dose powder; Take 17 g by mouth daily    Encounter for immunization    He agrees for the COVID-19 immunization today    Postnasal drip    No postnasal drip noted continue cetirizine    Benign prostatic hyperplasia (BPH) with urinary urgency    Good flow of urine according to the patient continue this.  - tamsulosin (FLOMAX) 0.4 MG capsule; [TAMSULOSIN (FLOMAX) 0.4 MG CAP] TAKE 1 CAPSULE (0.4 MG TOTAL) BY MOUTH DAILY AFTER LUNCH.    Encounter for medication refill      - polyethylene glycol (MIRALAX) 17 GM/Dose powder; Take 17 g by mouth daily    Pure hypercholesterolemia      - simvastatin (ZOCOR) 20 MG tablet; Take 1 tablet (20 mg) by mouth every evening               BMI:   Estimated body mass index is 28.24 kg/m  as calculated from the following:    Height as of this encounter: 1.524 m (5').    Weight as of this encounter: 65.6 kg (144 lb 9.6 oz).           Shree Dyson MD  [facility] NEISHA Vee is a 73 year old, presenting for the following health issues:  follow up   (Constipation )      12/6/2023    10:06 AM   Additional Questions   Roomed by rylie earl   Accompanied by Niece       HPI         73-year-old male here with his daughter to discuss his issues he reports he is doing relatively well just received an injection at [facility] for his right knee the previous injection lasted about months.  He says he does not reprint his right knee and his left knee been rehabbed with exercises and the fact that he has medication that radiates to his hip into his right leg and knee when he takes.  He is not constipated currently.  He is sleeping well.      Review of Systems   Constitutional, HEENT, cardiovascular, pulmonary, gi and gu systems are negative, except as otherwise noted.      Objective    /71 (BP Location: Right arm, Patient Position: Sitting, Cuff Size: Adult Regular)   Pulse 63   Temp 97.8  F (36.6  C) (Oral)   Resp 16   Ht 1.524 m (5')   Wt 65.6 kg (144 lb 9.6 oz)   SpO2 98%   BMI 28.24 kg/m    Body mass index is 28.24 kg/m .  Physical Exam   GENERAL: healthy, alert and no distress  EYES: Eyes grossly normal to inspection, PERRL and conjunctivae and sclerae normal  NECK: no adenopathy, no asymmetry, masses, or scars and thyroid normal to palpation  RESP: lungs clear to auscultation - no rales, rhonchi or wheezes  CV: regular rate and rhythm, normal S1 S2, no S3 or S4, no murmur, click or rub, no peripheral edema and peripheral pulses strong  ABDOMEN: soft, nontender, no hepatosplenomegaly, no masses and bowel sounds normal  MS: no gross musculoskeletal defects noted, no edema  SKIN: no suspicious lesions or rashes  NEURO: Normal strength and tone, mentation intact and speech normal  PSYCH: mentation appears normal, affect normal/bright

## 2024-01-04 ENCOUNTER — MEDICAL CORRESPONDENCE (OUTPATIENT)
Dept: HEALTH INFORMATION MANAGEMENT | Facility: CLINIC | Age: 74
End: 2024-01-04
Payer: MEDICARE

## 2024-01-07 DIAGNOSIS — G57.92 NEURITIS OF LEFT LOWER EXTREMITY: ICD-10-CM

## 2024-01-08 RX ORDER — GABAPENTIN 300 MG/1
600 CAPSULE ORAL 3 TIMES DAILY
Qty: 180 CAPSULE | Refills: 3 | Status: SHIPPED | OUTPATIENT
Start: 2024-01-08

## 2024-04-03 ENCOUNTER — PATIENT OUTREACH (OUTPATIENT)
Dept: GERIATRIC MEDICINE | Facility: CLINIC | Age: 74
End: 2024-04-03
Payer: MEDICARE

## 2024-04-03 DIAGNOSIS — R09.82 POSTNASAL DRIP: ICD-10-CM

## 2024-04-03 RX ORDER — CETIRIZINE HYDROCHLORIDE 10 MG/1
TABLET ORAL
Qty: 30 TABLET | Refills: 4 | Status: SHIPPED | OUTPATIENT
Start: 2024-04-03

## 2024-04-03 NOTE — PROGRESS NOTES
[facility] Care Coordination Contact    Called member to complete six month assessment and left a message requesting a return call.    Care Coordinator tasked CMS to mail 6 month UTR letter.     JANNETH Gonzalez  [facility]  Phone: [phone]

## 2024-04-03 NOTE — LETTER
April 3, 2024    CHARO PENA  [address] [place] [address]    Dear Charo:     I m your care coordinator. I ve been unable to reach you by phone. I am writing to ask you or your authorized representative to call me at [phone]. If you reach my voicemail, leave a message with your daytime phone number. Include a date and time that I can call you. If you are hearing impaired, call the [place] [facility] at 099 or [phone] (cjwdta-if-wgmusm relay service).    The reason I am trying to reach you is:     [] To schedule an assessment  [x] For your six (6)-month check-in  [] Other:      Please call me as soon as you receive this letter. I look forward to speaking with you.    Sincerely,    JANNETH Gonzalez  [phone]  [email or web address]        [id number] accepted  [id number]                                                                        B  (08/2022)

## 2024-04-03 NOTE — PROGRESS NOTES
"[facility] Care Coordination Contact    Per CC, mailed client an \"Unable to Contact\" letter.    Elkin Thomas  [facility]  Case Management Specialist  [phone]      "

## 2024-04-11 ENCOUNTER — TELEPHONE (OUTPATIENT)
Dept: FAMILY MEDICINE | Facility: CLINIC | Age: 74
End: 2024-04-11

## 2024-04-11 ENCOUNTER — OFFICE VISIT (OUTPATIENT)
Dept: FAMILY MEDICINE | Facility: CLINIC | Age: 74
End: 2024-04-11
Payer: MEDICARE

## 2024-04-11 VITALS
OXYGEN SATURATION: 97 % | TEMPERATURE: 98.1 F | BODY MASS INDEX: 28.62 KG/M2 | WEIGHT: 145.8 LBS | RESPIRATION RATE: 16 BRPM | SYSTOLIC BLOOD PRESSURE: 116 MMHG | HEART RATE: 72 BPM | DIASTOLIC BLOOD PRESSURE: 58 MMHG | HEIGHT: 60 IN

## 2024-04-11 DIAGNOSIS — Z23 NEED FOR VACCINATION: ICD-10-CM

## 2024-04-11 DIAGNOSIS — M48.061 SPINAL STENOSIS, LUMBAR REGION, WITHOUT NEUROGENIC CLAUDICATION: ICD-10-CM

## 2024-04-11 DIAGNOSIS — N40.1 BENIGN PROSTATIC HYPERPLASIA (BPH) WITH URINARY URGENCY: ICD-10-CM

## 2024-04-11 DIAGNOSIS — H10.13 ALLERGIC CONJUNCTIVITIS, BILATERAL: ICD-10-CM

## 2024-04-11 DIAGNOSIS — R39.15 BENIGN PROSTATIC HYPERPLASIA (BPH) WITH URINARY URGENCY: ICD-10-CM

## 2024-04-11 DIAGNOSIS — M17.11 PRIMARY OSTEOARTHRITIS OF RIGHT KNEE: ICD-10-CM

## 2024-04-11 DIAGNOSIS — M51.369 DDD (DEGENERATIVE DISC DISEASE), LUMBAR: Primary | ICD-10-CM

## 2024-04-11 DIAGNOSIS — M54.41 CHRONIC BILATERAL LOW BACK PAIN WITH RIGHT-SIDED SCIATICA: ICD-10-CM

## 2024-04-11 DIAGNOSIS — G89.29 CHRONIC BILATERAL LOW BACK PAIN WITH RIGHT-SIDED SCIATICA: ICD-10-CM

## 2024-04-11 PROCEDURE — 90750 HZV VACC RECOMBINANT IM: CPT | Performed by: FAMILY MEDICINE

## 2024-04-11 PROCEDURE — 90471 IMMUNIZATION ADMIN: CPT | Performed by: FAMILY MEDICINE

## 2024-04-11 PROCEDURE — 99214 OFFICE O/P EST MOD 30 MIN: CPT | Mod: 25 | Performed by: FAMILY MEDICINE

## 2024-04-11 ASSESSMENT — PATIENT HEALTH QUESTIONNAIRE - PHQ9
SUM OF ALL RESPONSES TO PHQ QUESTIONS 1-9: 9
10. IF YOU CHECKED OFF ANY PROBLEMS, HOW DIFFICULT HAVE THESE PROBLEMS MADE IT FOR YOU TO DO YOUR WORK, TAKE CARE OF THINGS AT HOME, OR GET ALONG WITH OTHER PEOPLE: NOT DIFFICULT AT ALL
SUM OF ALL RESPONSES TO PHQ QUESTIONS 1-9: 9

## 2024-04-11 NOTE — PROGRESS NOTES
"  Assessment & Plan     DDD (degenerative disc disease), lumbar  Lumbar Canal Stenosis  Chronic bilateral low back pain with right-sided sciatica  Chronic low back pain with right sided radiculopathy due to lumbar DDD with lumbar canal stenosis s/p lumbar spinal surgery. No myelopathic signs today, no signs or symptoms of cauda equina. Pain is well managed with current medications including gabapentin 600mg TID, duloxetine 60mg daily, and celecoxib 200mg daily. His PHQ9 today indicates possible mild depression but he denies symptoms of sadness and moves slowly due to pain; he uses a cane with good effect.    Primary osteoarthritis of right knee  He is seeing Fannin for injections and this is helping.    Benign prostatic hyperplasia (BPH) with urinary urgency  Relatively well managed with tamsulosin.    Allergic conjunctivitis, bilateral  Requests refill.  - ketotifen fumarate 0.035%, ketotifen 0.025%, (ZADITOR) 0.025 % ophthalmic solution; Place 1 drop into both eyes daily as needed for itching    Need for vaccination  - ZOSTER RECOMBINANT ADJUVANTED (SHINGRIX)      I spent a total of 34 minutes on the day of the visit.   Time spent by me doing chart review, history and exam, documentation and further activities per the note      BMI  Estimated body mass index is 29.2 kg/m  as calculated from the following:    Height as of this encounter: 1.505 m (4' 11.25\").    Weight as of this encounter: 66.1 kg (145 lb 12.8 oz).         Follow up for annual preventive visit.    Vivian Vee is a 74 year old, presenting for the following health issues:  RECHECK (Back Pain) and Establish Care      4/11/2024     1:44 PM   Additional Questions   Roomed by yuni earl   Accompanied by Daughter Manuel El         4/11/2024     1:44 PM    Services Provided    services provided? Yes   Antonina Sheffield   Type of interpretation provided Telephone       History of Present Illness       Back Pain:  He presents for follow up " of back pain. Patient's back pain is a recurring problem.  Location of back pain:  Right lower back and left lower back  Description of back pain: other  Back pain spreads: nowhere    Since patient first noticed back pain, pain is: always present, but gets better and worse  Does back pain interfere with his job:  No       Reason for visit:  [place]    He eats 0-1 servings of fruits and vegetables daily.He consumes 2 sweetened beverage(s) daily.He exercises with enough effort to increase his heart rate 9 or less minutes per day.  He exercises with enough effort to increase his heart rate 4 days per week.   He is taking medications regularly.     Pain both legs, already had surgery on left knee  left TKA 2021  Gets injections of right knee every 3 mos, this helps  Goes to [facility] in [place], Dr. Mcclure  Right knee 524/2016 meniscus tear (posterior horn) and chondromalacia patella      MRI lumbar spine 10/2017 with mild to mod multilevel DDD superimposed on developmental sherri canal stenosis, resulting in mod to severe spinal canal compromise at L3-L4 and L4-L5  Varying degrees of neural foraminal compromise, high grade bilaterally at L4-5  Had a surgery about 10y ago for his back, which helped  Pain is manageable with taking medications: gabapentin 600mg TID (just filled 4/3/24), duloxetine 60mg daily, celecoxib 200mg daily, uses vaporub  Right leg gets numb and tingly  Sometimes urinates self during night - needs diaper for this, but no accidents during day; cannot walk fast due to leg pain and will leak  No weakness, just pain around the joint  No saddle anesthesia    Denies feeling sad or depressed; no overwhelming feelings of sadness in past      PATIENT HEALTH QUESTIONNAIRE-9 (PHQ - 9)    Over the last 2 weeks, how often have you been bothered by any of the following problems?    1. Little interest or pleasure in doing things -  Nearly every day   2. Feeling down, depressed, or hopeless -  Not  "at all   3. Trouble falling or staying asleep, or sleeping too much - Not at all   4. Feeling tired or having little energy -  Nearly every day   5. Poor appetite or overeating -  Not at all   6. Feeling bad about yourself - or that you are a failure or have let yourself or your family down -  Not at all   7. Trouble concentrating on things, such as reading the newspaper or watching television - Not at all   8. Moving or speaking so slowly that other people could have noticed? Or the opposite - being so fidgety or restless that you have been moving around a lot more than usual Nearly every day   9. Thoughts that you would be better off dead or of hurting  yourself in some way Not at all   Total Score: 9     If you checked off any problems, how difficult have these problems made it for you to do your work, take care of things at home, or get along with other people?      Developed by Jose Brown, Katie Mcdemrott, Erickson Bueno and colleagues, with an educational janie from [employer] No permission required to reproduce, translate, display or distribute. permission required to reproduce, translate, display or distribute.                Review of Systems  Constitutional, HEENT, cardiovascular, pulmonary, gi and gu systems are negative, except as otherwise noted.      Objective    /58   Pulse 72   Temp 98.1  F (36.7  C) (Oral)   Resp 16   Ht 1.505 m (4' 11.25\")   Wt 66.1 kg (145 lb 12.8 oz)   SpO2 97%   BMI 29.20 kg/m    Body mass index is 29.2 kg/m .  Physical Exam   GENERAL: alert and no distress  EYES: Eyes grossly normal to inspection, PERRL and conjunctivae and sclerae normal  HENT: ear canals and TM's normal, nose and mouth without ulcers or lesions  NECK: no adenopathy, no asymmetry, masses, or scars  RESP: lungs clear to auscultation - no rales, rhonchi or wheezes  CV: regular rate and rhythm, normal S1 S2, no S3 or S4, no murmur, click or rub, no peripheral edema  MS: no gross " musculoskeletal defects noted, no edema  SKIN: no suspicious lesions or rashes  NEURO: Normal strength and tone, mentation intact and speech normal. No focal deficits, motor strength 5/5 in the bilateral lower extremities and sensation intact to light touch bilaterally.   PSYCH: mentation appears normal, affect normal/bright            Signed Electronically by: Jaja Arroyo MD      Prior to immunization administration, verified patients identity using patient s name and date of birth. Please see Immunization Activity for additional information.     Screening Questionnaire for Adult Immunization    Are you sick today?   No   Do you have allergies to medications, food, a vaccine component or latex?   No   Have you ever had a serious reaction after receiving a vaccination?   No   Do you have a long-term health problem with heart, lung, kidney, or metabolic disease (e.g., diabetes), asthma, a blood disorder, no spleen, complement component deficiency, a cochlear implant, or a spinal fluid leak?  Are you on long-term aspirin therapy?   No   Do you have cancer, leukemia, HIV/AIDS, or any other immune system problem?   No   Do you have a parent, brother, or sister with an immune system problem?   No   In the past 3 months, have you taken medications that affect  your immune system, such as prednisone, other steroids, or anticancer drugs; drugs for the treatment of rheumatoid arthritis, Crohn s disease, or psoriasis; or have you had radiation treatments?   No   Have you had a seizure, or a brain or other nervous system problem?   No   During the past year, have you received a transfusion of blood or blood    products, or been given immune (gamma) globulin or antiviral drug?   No   For women: Are you pregnant or is there a chance you could become       pregnant during the next month?   No   Have you received any vaccinations in the past 4 weeks?   No     Immunization questionnaire answers were all  negative.      Patient instructed to remain in clinic for 15 minutes afterwards, and to report any adverse reactions.     Screening performed by Kelle Mason MA on 4/11/2024 at 2:43 PM.

## 2024-04-11 NOTE — TELEPHONE ENCOUNTER
Patient Quality Outreach    Patient is due for the following:   Physical Preventive Adult Physical    Next Steps:   Patient was scheduled for prev adult     Type of outreach:    Phone, spoke to patient/parent. ciro      Questions for provider review:    None           Kelle Mason MA

## 2024-05-07 ENCOUNTER — PATIENT OUTREACH (OUTPATIENT)
Dept: GERIATRIC MEDICINE | Facility: CLINIC | Age: 74
End: 2024-05-07
Payer: MEDICARE

## 2024-05-07 NOTE — PROGRESS NOTES
[facility] Care Coordination Contact    Per CC, added ADC with [facility]. Completed following tasks: Submitted referrals/auths for ADC & transporation and Updated services in Database  and Member Signature - POC Change:  Per CC, member has made a change to their POC.  Care Plan Change Letter mailed to member for signature with a self-addressed return envelope.   and Provider Signature - No POC Shared:  Member indicates that they do not want their POC shared with any EW providers.     Nettie Rudd  Case Management Specialist   [facility]  [phone]

## 2024-05-09 ENCOUNTER — TELEPHONE (OUTPATIENT)
Dept: FAMILY MEDICINE | Facility: CLINIC | Age: 74
End: 2024-05-09
Payer: MEDICARE

## 2024-05-09 NOTE — TELEPHONE ENCOUNTER
Forms/Letter Request    Type of form/letter: OTHER: Physical Condition Report        Do we have the form/letter: Yes: ATG    Who is the form from? [facility] (if other please explain)    Where did/will the form come from? form was faxed in    When is form/letter needed by: asap    How would you like the form/letter returned: Fax : [phone]

## 2024-05-09 NOTE — TELEPHONE ENCOUNTER
Form collected from the .  Pre-filled as much as possible.  Placed form in provider's blue folder for completion.

## 2024-05-15 NOTE — TELEPHONE ENCOUNTER
Form received in today's blue folder.  Form successfully faxed to [phone].  No further action needed.

## 2024-07-08 DIAGNOSIS — M17.12 PRIMARY OSTEOARTHRITIS OF LEFT KNEE: ICD-10-CM

## 2024-07-08 RX ORDER — CELECOXIB 200 MG/1
200 CAPSULE ORAL DAILY
Qty: 30 CAPSULE | Refills: 11 | Status: SHIPPED | OUTPATIENT
Start: 2024-07-08

## 2024-07-18 ENCOUNTER — PATIENT OUTREACH (OUTPATIENT)
Dept: GERIATRIC MEDICINE | Facility: CLINIC | Age: 74
End: 2024-07-18
Payer: MEDICARE

## 2024-07-18 NOTE — PROGRESS NOTES
[facility] Care Coordination Contact    Care Coordinator spoke to Mariusz's family with [employer]  and advised that MA is showing closed. They reported that they did send paperwork into [facility].   Care Coordinator suggested to go to [facility].     Care Coordinator also asked [facility] to see if they could help and call [facility].    JANNETH Gonzalez  [facility]  Phone: [phone]

## 2024-09-20 ENCOUNTER — PATIENT OUTREACH (OUTPATIENT)
Dept: GERIATRIC MEDICINE | Facility: CLINIC | Age: 74
End: 2024-09-20
Payer: MEDICARE

## 2024-09-30 ENCOUNTER — PATIENT OUTREACH (OUTPATIENT)
Dept: GERIATRIC MEDICINE | Facility: CLINIC | Age: 74
End: 2024-09-30
Payer: MEDICARE

## 2024-09-30 NOTE — PROGRESS NOTES
[facility] Care Coordination Contact    [facility]:  Emailed required PCA documents to [facility].  Faxed copy of PCA assessment to PCA Agency and mailed copy to member.  Faxed MD Communication to PCP.     Ally Melchor  Case Management Specialist  [facility]  [phone]

## 2024-10-10 ASSESSMENT — PATIENT HEALTH QUESTIONNAIRE - PHQ9: SUM OF ALL RESPONSES TO PHQ QUESTIONS 1-9: 0

## 2024-10-10 ASSESSMENT — ACTIVITIES OF DAILY LIVING (ADL)
DEPENDENT_IADLS:: CLEANING;COOKING;LAUNDRY;SHOPPING;MEAL PREPARATION;MEDICATION MANAGEMENT;INCONTINENCE;TRANSPORTATION;MONEY MANAGEMENT

## 2024-10-10 NOTE — PROGRESS NOTES
[facility] Care Coordination Contact    [facility] Home Visit Assessment     Home visit for Health Risk Assessment with Mariusz Hebert completed on 09/20/2024    Type of residence:: Private home - stairs  Current living arrangement:: I live in a private home with family     Assessment completed with:: Patient, Family    Current Care Plan  Member currently receiving the following home care services:     Member currently receiving the following community resources: DME, PCA, Housekeeping/Chore Agency      Medication Review  Medication reconciliation completed in Epic: Yes  Medication set-up & administration: Family/informal caregiver sets up daily.  Family caregiver administers medications.  Medication Risk Assessment Medication (1 or more, place referral to MTM): N/A: No risk factors identified  MTM Referral Placed: No: No risk factors idenified    Mental/Behavioral Health   Depression Screening:   PHQ-2 Total Score (Adult) - Positive if 3 or more points; Administer PHQ-9 if positive: 0  PHQ-9 Total Score: 0    Mental health DX:: No        Falls Assessment:   Fallen 2 or more times in the past year?: No   Any fall with injury in the past year?: No    ADL/IADL Dependencies:   Dependent ADLs:: Ambulation-cane, Bathing, Dressing, Grooming, Incontinence, Toileting, Transfers  Dependent IADLs:: Cleaning, Cooking, Laundry, Shopping, Meal Preparation, Medication Management, Incontinence, Transportation, Money Management    Health Plan sponsored benefits: [employer]: Shared information regarding preventative health screening and health plan supplemental benefits/incentives. Reviewed medication disposal form.    PCA Assessment completed at visit: Yes Annual PCA assessment indicated 4 hours per day of PCA. This is the same as the previous assessment.     Elderly Waiver Eligibility: Yes-will continue on EW    Care Plan & Recommendations: He continues to be eligible for 4 hours daily of PCA and is adding 2 hours and 15  minutes weekly of homemaking.   He continues to go to his temple weekly with his family.  He receives pull ups monthly.      [facility] for detailed assessment information.    Follow-Up Plan: Member informed of future contact, plan to f/u with member with a 6 month telephone assessment.  Contact information shared with member and family, encouraged member to call with any questions or concerns at any time.    [place] care continuum providers: Please see Snapshot and Care Management Flowsheets for Specific details of care plan.    This CC note routed to PCP, [facility] Arroy[facility], [facility]  Phone: [phone]

## 2024-10-16 ENCOUNTER — PATIENT OUTREACH (OUTPATIENT)
Dept: GERIATRIC MEDICINE | Facility: CLINIC | Age: 74
End: 2024-10-16
Payer: MEDICARE

## 2024-10-16 NOTE — PROGRESS NOTES
[facility] Care Coordination Contact    Received a request to submit a DTR for the terminated of Adult Day Care EW transportation. Documentation completed and faxed to the health plan. Care Coordinator aware.    Isa Thurston RN  Utilization   [facility]  [phone]

## 2024-10-21 ENCOUNTER — PATIENT OUTREACH (OUTPATIENT)
Dept: GERIATRIC MEDICINE | Facility: CLINIC | Age: 74
End: 2024-10-21

## 2024-10-21 ENCOUNTER — OFFICE VISIT (OUTPATIENT)
Dept: FAMILY MEDICINE | Facility: CLINIC | Age: 74
End: 2024-10-21
Payer: MEDICARE

## 2024-10-21 VITALS
HEART RATE: 72 BPM | HEIGHT: 60 IN | DIASTOLIC BLOOD PRESSURE: 64 MMHG | RESPIRATION RATE: 18 BRPM | SYSTOLIC BLOOD PRESSURE: 114 MMHG | OXYGEN SATURATION: 95 % | TEMPERATURE: 98.5 F | WEIGHT: 143 LBS | BODY MASS INDEX: 28.07 KG/M2

## 2024-10-21 DIAGNOSIS — R39.81 FUNCTIONAL URINARY INCONTINENCE: ICD-10-CM

## 2024-10-21 DIAGNOSIS — K59.01 SLOW TRANSIT CONSTIPATION: ICD-10-CM

## 2024-10-21 DIAGNOSIS — Z23 NEED FOR VACCINATION: ICD-10-CM

## 2024-10-21 DIAGNOSIS — R63.4 WEIGHT LOSS: ICD-10-CM

## 2024-10-21 DIAGNOSIS — M17.11 PRIMARY OSTEOARTHRITIS OF RIGHT KNEE: ICD-10-CM

## 2024-10-21 DIAGNOSIS — N40.1 BENIGN PROSTATIC HYPERPLASIA (BPH) WITH URINARY URGENCY: ICD-10-CM

## 2024-10-21 DIAGNOSIS — G57.92 NEURITIS OF LEFT LOWER EXTREMITY: ICD-10-CM

## 2024-10-21 DIAGNOSIS — Z13.9 ENCOUNTER FOR SCREENING INVOLVING SOCIAL DETERMINANTS OF HEALTH (SDOH): ICD-10-CM

## 2024-10-21 DIAGNOSIS — R39.15 BENIGN PROSTATIC HYPERPLASIA (BPH) WITH URINARY URGENCY: ICD-10-CM

## 2024-10-21 DIAGNOSIS — Z00.00 ROUTINE GENERAL MEDICAL EXAMINATION AT A HEALTH CARE FACILITY: Primary | ICD-10-CM

## 2024-10-21 DIAGNOSIS — R73.09 ELEVATED GLUCOSE: ICD-10-CM

## 2024-10-21 DIAGNOSIS — E78.2 MIXED HYPERLIPIDEMIA: ICD-10-CM

## 2024-10-21 DIAGNOSIS — M48.061 SPINAL STENOSIS, LUMBAR REGION, WITHOUT NEUROGENIC CLAUDICATION: ICD-10-CM

## 2024-10-21 DIAGNOSIS — R09.82 POSTNASAL DRIP: ICD-10-CM

## 2024-10-21 LAB
ALBUMIN SERPL BCG-MCNC: 3.8 G/DL (ref 3.5–5.2)
ALP SERPL-CCNC: 85 U/L (ref 40–150)
ALT SERPL W P-5'-P-CCNC: 40 U/L (ref 0–70)
ANION GAP SERPL CALCULATED.3IONS-SCNC: 11 MMOL/L (ref 7–15)
AST SERPL W P-5'-P-CCNC: 60 U/L (ref 0–45)
BILIRUB SERPL-MCNC: 0.5 MG/DL
BUN SERPL-MCNC: 14.9 MG/DL (ref 8–23)
CALCIUM SERPL-MCNC: 9.3 MG/DL (ref 8.8–10.4)
CHLORIDE SERPL-SCNC: 102 MMOL/L (ref 98–107)
CHOLEST SERPL-MCNC: 323 MG/DL
CREAT SERPL-MCNC: 1.09 MG/DL (ref 0.67–1.17)
EGFRCR SERPLBLD CKD-EPI 2021: 72 ML/MIN/1.73M2
EST. AVERAGE GLUCOSE BLD GHB EST-MCNC: 126 MG/DL
FASTING STATUS PATIENT QL REPORTED: NO
FASTING STATUS PATIENT QL REPORTED: NO
GLUCOSE SERPL-MCNC: 105 MG/DL (ref 70–99)
HBA1C MFR BLD: 6 % (ref 0–5.6)
HCO3 SERPL-SCNC: 24 MMOL/L (ref 22–29)
HDLC SERPL-MCNC: 59 MG/DL
LDLC SERPL CALC-MCNC: 198 MG/DL
NONHDLC SERPL-MCNC: 264 MG/DL
POTASSIUM SERPL-SCNC: 3.9 MMOL/L (ref 3.4–5.3)
PROT SERPL-MCNC: 7.8 G/DL (ref 6.4–8.3)
SODIUM SERPL-SCNC: 137 MMOL/L (ref 135–145)
TRIGL SERPL-MCNC: 332 MG/DL

## 2024-10-21 PROCEDURE — 80061 LIPID PANEL: CPT | Performed by: FAMILY MEDICINE

## 2024-10-21 PROCEDURE — 36415 COLL VENOUS BLD VENIPUNCTURE: CPT | Performed by: FAMILY MEDICINE

## 2024-10-21 PROCEDURE — 90662 IIV NO PRSV INCREASED AG IM: CPT | Performed by: FAMILY MEDICINE

## 2024-10-21 PROCEDURE — 83036 HEMOGLOBIN GLYCOSYLATED A1C: CPT | Performed by: FAMILY MEDICINE

## 2024-10-21 PROCEDURE — 80053 COMPREHEN METABOLIC PANEL: CPT | Performed by: FAMILY MEDICINE

## 2024-10-21 PROCEDURE — G0008 ADMIN INFLUENZA VIRUS VAC: HCPCS | Performed by: FAMILY MEDICINE

## 2024-10-21 PROCEDURE — G0402 INITIAL PREVENTIVE EXAM: HCPCS | Performed by: FAMILY MEDICINE

## 2024-10-21 SDOH — HEALTH STABILITY: PHYSICAL HEALTH: ON AVERAGE, HOW MANY DAYS PER WEEK DO YOU ENGAGE IN MODERATE TO STRENUOUS EXERCISE (LIKE A BRISK WALK)?: 2 DAYS

## 2024-10-21 ASSESSMENT — SOCIAL DETERMINANTS OF HEALTH (SDOH)
HOW OFTEN DO YOU GET TOGETHER WITH FRIENDS OR RELATIVES?: TWICE A WEEK
HOW OFTEN DO YOU GET TOGETHER WITH FRIENDS OR RELATIVES?: TWICE A WEEK

## 2024-10-21 NOTE — LETTER
October 21, 2024       Mariusz Hebert  [address] [place] [address]      Dear Mariusz,    At [facility], we re dedicated to improving your health and wellness. Enclosed is the Support Plan developed with you on 9/20/24. Please review the Support Plan carefully.    As a reminder, during your visit we talked about:   Ways to manage your physical and mental health   Using health care to maintain and improve your health    Your preventive care needs      Remember to contact your care coordinator if you:   Are hospitalized or plan to be hospitalized    Have a fall     Have a change in your physical or mental health   Need help finding support or services    If you have questions or don t agree with your Support Plan, call me at [phone]. You can also call me if your needs change. TTY users call the [place] [employer] at 959 or 1-924.916.8470 (nasono-xt-dwvxyf relay service).    Sincerely,       JANNETH Gonzalez  [phone]  [email or web address]                [id number] accepted     (06/2024)                [address] [place] [place] [address]  [phone]  fax [phone]  [facility]

## 2024-10-21 NOTE — PROGRESS NOTES
[facility] Care Coordination Contact    Received after visit chart from care coordinator.  Completed following tasks: Mailed copy of support plan to member, Mailed [place] Choices signature sheet pages 3-4, Mailed Safe Medication Disposal , Submitted referrals/auths for homemaking, and Updated services in Database   and Provider Signature - No Support Plan Shared:  Member indicates that they do not want their support plan shared with any EW providers.    Elkin Thomas  [facility]  Case Management Specialist  [phone]

## 2024-10-28 PROBLEM — R39.81 FUNCTIONAL URINARY INCONTINENCE: Status: ACTIVE | Noted: 2024-10-28

## 2024-10-28 RX ORDER — CETIRIZINE HYDROCHLORIDE 10 MG/1
TABLET ORAL
Qty: 90 TABLET | Refills: 4 | Status: SHIPPED | OUTPATIENT
Start: 2024-10-28

## 2024-10-28 RX ORDER — TAMSULOSIN HYDROCHLORIDE 0.4 MG/1
CAPSULE ORAL
Qty: 90 CAPSULE | Refills: 3 | Status: SHIPPED | OUTPATIENT
Start: 2024-10-28

## 2024-10-28 RX ORDER — GABAPENTIN 300 MG/1
600 CAPSULE ORAL 3 TIMES DAILY
Qty: 180 CAPSULE | Refills: 3 | Status: SHIPPED | OUTPATIENT
Start: 2024-10-28

## 2024-10-28 RX ORDER — DULOXETIN HYDROCHLORIDE 60 MG/1
60 CAPSULE, DELAYED RELEASE ORAL DAILY
Qty: 90 CAPSULE | Refills: 3 | Status: SHIPPED | OUTPATIENT
Start: 2024-10-28

## 2024-10-28 RX ORDER — POLYETHYLENE GLYCOL 3350 17 G/17G
17 POWDER, FOR SOLUTION ORAL DAILY
Qty: 850 G | Refills: 11 | Status: SHIPPED | OUTPATIENT
Start: 2024-10-28

## 2024-10-28 RX ORDER — SIMVASTATIN 20 MG
20 TABLET ORAL EVERY EVENING
Qty: 90 TABLET | Refills: 3 | Status: SHIPPED | OUTPATIENT
Start: 2024-10-28

## 2024-11-14 ENCOUNTER — PATIENT OUTREACH (OUTPATIENT)
Dept: GERIATRIC MEDICINE | Facility: CLINIC | Age: 74
End: 2024-11-14
Payer: MEDICARE

## 2024-11-14 NOTE — PROGRESS NOTES
[facility] Care Coordination Contact    Per CC, member wants to continue ADC/ADC transportation. There was also an increase. Submitted referrals/auths for adc/adc transportation and Updated services in Database    Provider Signature - No Support Plan Shared:  Member indicates that they do not want their support plan shared with any EW providers. and Member Signature - Support Plan Change:  Per CC, member has made a change to their support plan.  Care Plan Change Letter mailed to member for signature with a self-addressed return envelope.    Elkin Thomas  [facility]  Case Management Specialist  [phone]

## 2025-02-25 DIAGNOSIS — G57.92 NEURITIS OF LEFT LOWER EXTREMITY: ICD-10-CM

## 2025-02-25 RX ORDER — GABAPENTIN 300 MG/1
600 CAPSULE ORAL 3 TIMES DAILY
Qty: 180 CAPSULE | Refills: 3 | Status: SHIPPED | OUTPATIENT
Start: 2025-02-25

## 2025-04-21 ENCOUNTER — OFFICE VISIT (OUTPATIENT)
Dept: FAMILY MEDICINE | Facility: CLINIC | Age: 75
End: 2025-04-21
Attending: FAMILY MEDICINE
Payer: MEDICARE

## 2025-04-21 ENCOUNTER — VIRTUAL VISIT (OUTPATIENT)
Dept: INTERPRETER SERVICES | Facility: CLINIC | Age: 75
End: 2025-04-21

## 2025-04-21 VITALS
HEIGHT: 60 IN | OXYGEN SATURATION: 96 % | WEIGHT: 148.5 LBS | HEART RATE: 68 BPM | TEMPERATURE: 97.9 F | SYSTOLIC BLOOD PRESSURE: 120 MMHG | DIASTOLIC BLOOD PRESSURE: 66 MMHG | BODY MASS INDEX: 29.16 KG/M2 | RESPIRATION RATE: 16 BRPM

## 2025-04-21 DIAGNOSIS — H10.13 ALLERGIC CONJUNCTIVITIS, BILATERAL: ICD-10-CM

## 2025-04-21 DIAGNOSIS — R73.03 PREDIABETES: ICD-10-CM

## 2025-04-21 DIAGNOSIS — M17.12 PRIMARY OSTEOARTHRITIS OF LEFT KNEE: Primary | ICD-10-CM

## 2025-04-21 DIAGNOSIS — M48.062 SPINAL STENOSIS OF LUMBAR REGION WITH NEUROGENIC CLAUDICATION: ICD-10-CM

## 2025-04-21 PROCEDURE — T1013 SIGN LANG/ORAL INTERPRETER: HCPCS | Mod: U4 | Performed by: INTERPRETER

## 2025-04-21 PROCEDURE — 3078F DIAST BP <80 MM HG: CPT | Performed by: FAMILY MEDICINE

## 2025-04-21 PROCEDURE — 3074F SYST BP LT 130 MM HG: CPT | Performed by: FAMILY MEDICINE

## 2025-04-21 PROCEDURE — 99214 OFFICE O/P EST MOD 30 MIN: CPT | Performed by: FAMILY MEDICINE

## 2025-04-21 RX ORDER — CELECOXIB 200 MG/1
200 CAPSULE ORAL DAILY
Qty: 30 CAPSULE | Refills: 11 | Status: SHIPPED | OUTPATIENT
Start: 2025-04-21

## 2025-04-21 RX ORDER — KETOTIFEN FUMARATE 0.35 MG/ML
1 SOLUTION/ DROPS OPHTHALMIC 2 TIMES DAILY PRN
Qty: 5 ML | Refills: 11 | Status: SHIPPED | OUTPATIENT
Start: 2025-04-21

## 2025-04-21 NOTE — PROGRESS NOTES
"  {PROVIDER CHARTING PREFERENCE:650512}    Vivian Vee is a 75 year old, presenting for the following health issues:  Follow Up      4/21/2025    12:43 PM   Additional Questions   Roomed by Marcelina MORALES      {MA/LPN/RN Pre-Provider Visit Orders- hCG/UA/Strep (Optional):383014}  {SUPERLIST (Optional):812744}  {additonal problems for provider to add (Optional):473714}    {ROS Picklists (Optional):716361}      Objective    /66   Pulse 68   Temp 97.9  F (36.6  C) (Oral)   Resp 16   Ht 1.505 m (4' 11.25\")   Wt 67.4 kg (148 lb 8 oz)   SpO2 96%   BMI 29.74 kg/m    Body mass index is 29.74 kg/m .  Physical Exam   {Exam List (Optional):428230}    {Diagnostic Test Results (Optional):471078}        Signed Electronically by: Alex Daniel MD, MD  {Email feedback regarding this note to [email or web address]   :237467}  " "Day program 2 days per week.    Knee injection every 3 months.    A1c was 6.0    Current Outpatient Medications   Medication Sig Dispense Refill    acetaminophen (TYLENOL) 325 MG tablet Take 2 tablets (650 mg) by mouth every 4 hours as needed for other (mild pain) 100 tablet 0    celecoxib (CELEBREX) 200 MG capsule Take 1 capsule (200 mg) by mouth daily. 30 capsule 11    cetirizine (ZYRTEC) 10 MG tablet TAKE 1 PILL BY MOUTH DAILY 90 tablet 4    DULoxetine (CYMBALTA) 60 MG capsule Take 1 capsule (60 mg) by mouth daily. 90 capsule 3    gabapentin (NEURONTIN) 300 MG capsule TAKE 2 CAPSULES (600 MG) BY MOUTH 3 TIMES DAILY. 180 capsule 3    ketotifen fumarate 0.035% 0.035 % SOLN ophthalmic solution Place 1 drop into both eyes 2 times daily as needed for itching. 5 mL 11    polyethylene glycol (MIRALAX) 17 GM/Dose powder Take 17 g by mouth daily. 850 g 11    simvastatin (ZOCOR) 20 MG tablet Take 1 tablet (20 mg) by mouth every evening. 90 tablet 3    tamsulosin (FLOMAX) 0.4 MG capsule [TAMSULOSIN (FLOMAX) 0.4 MG CAP] TAKE 1 CAPSULE (0.4 MG TOTAL) BY MOUTH DAILY AFTER LUNCH. 90 capsule 3                   Objective    /66   Pulse 68   Temp 97.9  F (36.6  C) (Oral)   Resp 16   Ht 1.505 m (4' 11.25\")   Wt 67.4 kg (148 lb 8 oz)   SpO2 96%   BMI 29.74 kg/m    Body mass index is 29.74 kg/m .  Physical Exam     Heart normal  Lungs normal  Legs: no edema          Signed Electronically by: Alex Daniel MD    "

## 2025-05-03 PROBLEM — R73.03 PREDIABETES: Status: ACTIVE | Noted: 2025-05-03

## 2025-06-05 ENCOUNTER — PATIENT OUTREACH (OUTPATIENT)
Dept: GERIATRIC MEDICINE | Facility: CLINIC | Age: 75
End: 2025-06-05
Payer: MEDICARE

## 2025-06-06 NOTE — PROGRESS NOTES
[facility] Care Coordination Contact    Called member and Son Henry You to complete six month assessment and left a message requesting a return call.    JANNETH Gonzalez  [facility]  Phone: [phone]

## 2025-06-18 ENCOUNTER — TRANSFERRED RECORDS (OUTPATIENT)
Dept: HEALTH INFORMATION MANAGEMENT | Facility: CLINIC | Age: 75
End: 2025-06-18

## 2025-06-18 DIAGNOSIS — G57.92 NEURITIS OF LEFT LOWER EXTREMITY: ICD-10-CM

## 2025-06-18 LAB — COLOGUARD-ABSTRACT: NEGATIVE

## 2025-06-18 RX ORDER — GABAPENTIN 300 MG/1
600 CAPSULE ORAL 3 TIMES DAILY
Qty: 180 CAPSULE | Refills: 3 | Status: SHIPPED | OUTPATIENT
Start: 2025-06-18

## 2025-07-28 ENCOUNTER — OFFICE VISIT (OUTPATIENT)
Dept: FAMILY MEDICINE | Facility: CLINIC | Age: 75
End: 2025-07-28
Attending: FAMILY MEDICINE
Payer: MEDICARE

## 2025-07-28 VITALS
OXYGEN SATURATION: 98 % | SYSTOLIC BLOOD PRESSURE: 118 MMHG | HEART RATE: 64 BPM | HEIGHT: 60 IN | DIASTOLIC BLOOD PRESSURE: 58 MMHG | BODY MASS INDEX: 27.54 KG/M2 | TEMPERATURE: 97.8 F | WEIGHT: 140.25 LBS | RESPIRATION RATE: 16 BRPM

## 2025-07-28 DIAGNOSIS — R63.4 WEIGHT LOSS: ICD-10-CM

## 2025-07-28 DIAGNOSIS — M17.12 PRIMARY OSTEOARTHRITIS OF LEFT KNEE: Primary | ICD-10-CM

## 2025-07-28 DIAGNOSIS — R09.82 POSTNASAL DRIP: ICD-10-CM

## 2025-07-28 DIAGNOSIS — H10.13 ALLERGIC CONJUNCTIVITIS, BILATERAL: ICD-10-CM

## 2025-07-28 DIAGNOSIS — K59.01 SLOW TRANSIT CONSTIPATION: ICD-10-CM

## 2025-07-28 PROCEDURE — 99213 OFFICE O/P EST LOW 20 MIN: CPT | Performed by: FAMILY MEDICINE

## 2025-07-28 PROCEDURE — T1013 SIGN LANG/ORAL INTERPRETER: HCPCS

## 2025-07-28 PROCEDURE — 3074F SYST BP LT 130 MM HG: CPT | Performed by: FAMILY MEDICINE

## 2025-07-28 PROCEDURE — 3078F DIAST BP <80 MM HG: CPT | Performed by: FAMILY MEDICINE

## 2025-07-28 RX ORDER — POLYETHYLENE GLYCOL 3350 17 G/17G
17 POWDER, FOR SOLUTION ORAL DAILY
Qty: 850 G | Refills: 11 | Status: SHIPPED | OUTPATIENT
Start: 2025-07-28

## 2025-07-28 RX ORDER — KETOTIFEN FUMARATE 0.35 MG/ML
1 SOLUTION/ DROPS OPHTHALMIC 2 TIMES DAILY PRN
Qty: 5 ML | Refills: 11 | Status: SHIPPED | OUTPATIENT
Start: 2025-07-28

## 2025-07-28 RX ORDER — CETIRIZINE HYDROCHLORIDE 10 MG/1
TABLET ORAL
Qty: 90 TABLET | Refills: 4 | Status: SHIPPED | OUTPATIENT
Start: 2025-07-28

## 2025-08-02 RX ORDER — LACTOSE-REDUCED FOOD
237 LIQUID (ML) ORAL DAILY
Status: SHIPPED
Start: 2025-08-02 | End: 2025-08-06

## 2025-08-06 ENCOUNTER — TELEPHONE (OUTPATIENT)
Dept: FAMILY MEDICINE | Facility: CLINIC | Age: 75
End: 2025-08-06
Payer: MEDICARE

## 2025-08-06 DIAGNOSIS — R63.4 WEIGHT LOSS: ICD-10-CM

## 2025-08-06 RX ORDER — LACTOSE-REDUCED FOOD
237 LIQUID (ML) ORAL DAILY
Qty: 7110 ML | Refills: 11 | Status: SHIPPED | OUTPATIENT
Start: 2025-08-06

## 2025-08-27 ENCOUNTER — PATIENT OUTREACH (OUTPATIENT)
Dept: GERIATRIC MEDICINE | Facility: CLINIC | Age: 75
End: 2025-08-27
Payer: MEDICARE

## (undated) DEVICE — SOL WATER IRRIG 1000ML BOTTLE 2F7114

## (undated) DEVICE — GLOVE UNDER INDICATOR PI SZ 8.5 LF 41685

## (undated) DEVICE — SU STRATAFIX PDS PLUS 2-0 SPIRAL CT-1 30CM SXPP1B410

## (undated) DEVICE — BLADE RECIPRO DBL SIDED 277-96-275

## (undated) DEVICE — SOL NACL 0.9% IRRIG 1000ML BOTTLE 2F7124

## (undated) DEVICE — SU STRATAFIX PDS PLUS 1 CT-1 18" SXPP1A404

## (undated) DEVICE — SUCTION MANIFOLD NEPTUNE 2 SYS 4 PORT 0702-020-000

## (undated) DEVICE — PLATE GROUNDING ADULT W/CORD 9165L

## (undated) DEVICE — GLOVE BIOGEL PI SZ 6.5 40865

## (undated) DEVICE — ADH SKIN CLOSURE PREMIERPRO EXOFIN 1.0ML 3470

## (undated) DEVICE — SU MONOCRYL 3-0 PS-2 18" UND MCP497G

## (undated) DEVICE — CUSTOM PACK TOTAL KNEE SOP5BTKHEC

## (undated) DEVICE — CUSTOM PACK TOTAL KNEE ACCESSORY SOP5BTAHEA

## (undated) DEVICE — GLOVE UNDER INDICATOR PI SZ 6.5 LF 41665

## (undated) DEVICE — GLOVE BIOGEL PI SZ 8.0 40880

## (undated) DEVICE — HOOD FLYTE SURGICOOL

## (undated) DEVICE — SOL NACL 0.9% INJ 1000ML BAG 2B1324X

## (undated) DEVICE — DECANTER VIAL 2006S

## (undated) DEVICE — BLADE SAW SAGITTAL STRK DUAL CUT 4118-135-090

## (undated) DEVICE — HOLDER LIMB VELCRO OR 0814-1533

## (undated) DEVICE — GOWN IMPERVIOUS BREATHABLE SMART LG 89015

## (undated) DEVICE — BLADE SAW SAGITTAL STRK WIDE 25.4X85X1.2MM 2108-151-000

## (undated) DEVICE — SUTURE VICRYL+ 0 27IN CT-1 UND VCP260H

## (undated) DEVICE — GLOVE BIOGEL PI SZ 8.5 40885

## (undated) DEVICE — DRSG AQUACEL AG 3.5X9.75" HYDROFIBER 412011

## (undated) DEVICE — HOOD FLYTE SURGICOOL WITH PEEL AWAY

## (undated) DEVICE — GLOVE BIOGEL PI INDICATOR 8.0 LF 41680